# Patient Record
Sex: FEMALE | Race: WHITE | Employment: OTHER | URBAN - METROPOLITAN AREA
[De-identification: names, ages, dates, MRNs, and addresses within clinical notes are randomized per-mention and may not be internally consistent; named-entity substitution may affect disease eponyms.]

---

## 2020-02-25 ENCOUNTER — DOCUMENTATION ONLY (OUTPATIENT)
Dept: OTHER | Facility: CLINIC | Age: 85
End: 2020-02-25

## 2021-08-24 ENCOUNTER — ASSISTED LIVING VISIT (OUTPATIENT)
Dept: GERIATRICS | Facility: CLINIC | Age: 86
End: 2021-08-24
Payer: MEDICARE

## 2021-08-24 VITALS
HEART RATE: 82 BPM | RESPIRATION RATE: 20 BRPM | SYSTOLIC BLOOD PRESSURE: 155 MMHG | HEIGHT: 63 IN | TEMPERATURE: 96.6 F | WEIGHT: 129 LBS | DIASTOLIC BLOOD PRESSURE: 106 MMHG | BODY MASS INDEX: 22.86 KG/M2

## 2021-08-24 DIAGNOSIS — R26.9 IMPAIRED GAIT: ICD-10-CM

## 2021-08-24 DIAGNOSIS — M15.0 PRIMARY OSTEOARTHRITIS INVOLVING MULTIPLE JOINTS: ICD-10-CM

## 2021-08-24 DIAGNOSIS — E78.5 HYPERLIPIDEMIA, UNSPECIFIED HYPERLIPIDEMIA TYPE: ICD-10-CM

## 2021-08-24 DIAGNOSIS — G30.1 LATE ONSET ALZHEIMER'S DISEASE WITHOUT BEHAVIORAL DISTURBANCE (H): ICD-10-CM

## 2021-08-24 DIAGNOSIS — R26.2 DIFFICULTY WALKING: ICD-10-CM

## 2021-08-24 DIAGNOSIS — M25.551 HIP PAIN, RIGHT: ICD-10-CM

## 2021-08-24 DIAGNOSIS — I10 ESSENTIAL HYPERTENSION: Primary | ICD-10-CM

## 2021-08-24 DIAGNOSIS — J45.909 ASTHMA, UNSPECIFIED ASTHMA SEVERITY, UNSPECIFIED WHETHER COMPLICATED, UNSPECIFIED WHETHER PERSISTENT: ICD-10-CM

## 2021-08-24 DIAGNOSIS — M62.81 GENERALIZED MUSCLE WEAKNESS: ICD-10-CM

## 2021-08-24 DIAGNOSIS — F02.80 LATE ONSET ALZHEIMER'S DISEASE WITHOUT BEHAVIORAL DISTURBANCE (H): ICD-10-CM

## 2021-08-24 DIAGNOSIS — Z71.89 ACP (ADVANCE CARE PLANNING): ICD-10-CM

## 2021-08-24 DIAGNOSIS — F33.1 MODERATE EPISODE OF RECURRENT MAJOR DEPRESSIVE DISORDER (H): ICD-10-CM

## 2021-08-24 RX ORDER — ACETAMINOPHEN 500 MG
TABLET ORAL
Start: 2021-08-24 | End: 2021-09-19

## 2021-08-24 RX ORDER — VALSARTAN AND HYDROCHLOROTHIAZIDE 320; 25 MG/1; MG/1
1 TABLET, FILM COATED ORAL DAILY
Start: 2021-08-24 | End: 2021-09-19

## 2021-08-24 RX ORDER — DONEPEZIL HYDROCHLORIDE 5 MG/1
5 TABLET, FILM COATED ORAL AT BEDTIME
Start: 2021-08-24 | End: 2021-09-19

## 2021-08-24 RX ORDER — ALBUTEROL SULFATE 90 UG/1
2 AEROSOL, METERED RESPIRATORY (INHALATION) EVERY 4 HOURS PRN
Qty: 6.7 G | Refills: 3 | Status: SHIPPED | OUTPATIENT
Start: 2021-08-24

## 2021-08-24 ASSESSMENT — MIFFLIN-ST. JEOR: SCORE: 1013.23

## 2021-08-24 NOTE — LETTER
8/24/2021        RE: Edmundo James  [address] [place] [address]        [facility] GERIATRIC SERVICES  PRIMARY CARE PROVIDER AND CLINIC:  NADINE Rogers CNP, [address] [place] [address] / [place] [address]  Chief Complaint   Patient presents with     [facility] Medical Record Number:  [id number]  Place of Service where encounter took place:  [facility] ([facility]) [[id number]]    Edmundo James  is a 85 year old  (1935), living in above facility since March 2019 and now choosing to change PCPs to [facility].    HPI:      Patient is an 85 year old female that moved from [place] with her  in 2019.  She has remained in independent living since her move up to [place] and has not established with another healthcare provider or been seen medically.  She has had increased difficulty with ambulation and dressing and family wants her to establish with onsite care.  PHI significant for HTN, HLD, OA, asthma, and dementia without official work-up.  Very poor PMH is available and most given by her daughter, Kandice.    Patient is seen in her apartment today with her .  She reports doing just fine other then she is feeling weaker and having a harder time ambulating.  She defers most questions asked to her to her , saying she doesn't know.  Initially denies pain but then reports her knees and right hip bother her a lot, especially with ambulation.  She has fallen, none this month.      Per her , she has not been consistently taking her medications, although she feels that she has.  Mood appears down and depressed,  and daughter report this has been ongoing since her move to [place].  Patient agrees with this, but does not want to try any medications at this time    She denies shortness of breath, CP, dizziness, constipation, diarrhea, n/v, GERD, insomnia.  She does admit to being a little more forgetful then before, family has noticed a fairly significant  decline cognitively since move to [place].  She was started on Aricept back in [place], but per daughter, no formal work-up of her cognitive decline was done then.  We discussed ACP today, she and family in agreement DNR/DNI, ok for hospitalization for restorative cares.      CODE STATUS/ADVANCE DIRECTIVES DISCUSSION:  No CPR- Do NOT Intubate  DNR / DNI  ALLERGIES:   Allergies   Allergen Reactions     Cats Other (See Comments)     Runny nose, watery eyes      Dogs Other (See Comments)     Runny nose and watery eyes      Mold Other (See Comments)     Runny nose, watery eyes       PAST MEDICAL HISTORY:   Past Medical History:   Diagnosis Date     Heart disease      HTN (hypertension)      Hyperlipidemia      OA (osteoarthritis) of knee      Uncomplicated asthma       PAST SURGICAL HISTORY:   has a past surgical history that includes Cholecystectomy; Lumpectomy breast; and tonsillectomy.  FAMILY HISTORY: family history includes Heart Disease in her father; Hypertension in her father and mother.  SOCIAL HISTORY:   reports that she has never smoked. She has never used smokeless tobacco. She reports current alcohol use. She reports that she does not use drugs.  Patient's living condition: lives in an assisted living facility    Post Discharge Medication Reconciliation Status: patient was not discharged from an inpatient facility  Current Outpatient Medications   Medication Sig     albuterol (PROAIR HFA, PROVENTIL HFA, VENTOLIN HFA) 108 (90 BASE) MCG/ACT inhaler Inhale 2 puffs into the lungs every 6 hours     Atorvastatin Calcium (LIPITOR PO) Take 20 mg by mouth     BABY ASPIRIN PO      metoprolol (LOPRESSOR) 25 MG tablet Take 25 mg by mouth 2 times daily     triamcinolone (KENALOG) 0.1 % cream Apply sparingly to affected area three times daily as needed.     Valsartan (DIOVAN PO) Take 320 mg by mouth     No current facility-administered medications for this visit.       ROS:  10 point ROS of systems including Constitutional, Eyes,  "Respiratory, Cardiovascular, Gastroenterology, Genitourinary, Integumentary, Musculoskeletal, Psychiatric were all negative except for pertinent positives noted in my HPI.    Vitals:  BP (!) 155/106   Pulse 82   Temp (!) 96.6  F (35.9  C)   Resp 20   Ht 1.607 m (5' 3.25\")   Wt 58.5 kg (129 lb)   BMI 22.67 kg/m    Exam:  GENERAL APPEARANCE:  Alert, in no distress, cooperative  EYES:  EOM, conjunctivae, lids, pupils and irises normal, PERRL  NECK:  No adenopathy,masses or thyromegaly  RESP:  respiratory effort and palpation of chest normal, lungs clear to auscultation , no respiratory distress  CV:  Palpation and auscultation of heart done , regular rate and rhythm, no murmur, rub, or gallop, no edema  ABDOMEN:  no guarding or rebound, bowel sounds normal, no organomegaly  M/S:   Gait and station abnormal ambulates with 4ww, drags her left heel on ground, unsteady, difficulty going from sitting to standing position, gait very slow, no edema/erythema joints  SKIN:  no obvious rashes or wounds  NEURO:   speech clear, no tremor, normal strenght and tone, PERRL, poor recall of events   PSYCH:  oriented X 3, insight and judgement impaired, memory impaired , depressed     Lab/Diagnostic data:  none available    ASSESSMENT/PLAN:  (I10) Essential hypertension  (primary encounter diagnosis)  Comment: review of recent /95, 149/70, 151/96, 139/87, 132/81, 140/70  -per family, patient has not been consistently taking her medications.  She is supposed to be on Losartan/Hctz combo pill as well as metoprolol.  Staff will take over meds now.  Will need to assess BP with meds being taken and re-evaluate.  Discussed with family and staff today  Plan: BMP and CBC next week  -staff administer BP meds  -BP daily X 2 weeks, staff to update me   -losartan/hctz, metoprolol hold if HR <60    (M89.49) Primary osteoarthritis involving multiple joints  (R26.2) Difficulty walking  Impaired Gait  Right Hip Pain  (M62.81) Generalized " muscle weakness  Comment: severe to multiple joints, affecting ability to walk and dress self, pain to right hip.  No recent fall in past month.  No history of x-ray to right hip per family  -has been using prn Ibuprofen for pain, discussed dangers of this med in older adults and new pain management plan  Plan: AL staff assisting patient with cares  -home care for therapy strengthening, evaluation of potential needed DME  -x-ray right hip  -APAP 1,000mg po BID and QDprn  -Voltaren gel BID and BID prn     (J45.909) Asthma, unspecified asthma severity, unspecified whether complicated, unspecified whether persistent  Comment: per history, per family was severe at the time, but patient has not had any issues for several years.  Not currently on meds and has no rescue inhaler  Plan: albuterol rescue inhaler    (E78.5) Hyperlipidemia, unspecified hyperlipidemia type  Comment: per history  -lipid prescribed but per family, patient was not taking consistently  Plan: lipid panel next week   -on statin      (G30.1,  F02.80) Late onset Alzheimer's disease without behavioral disturbance (H)  Comment: per family report  -no formal cognitive testing available, but obvious dementia, poor historian, unable to recall events  -Aricept was prescribed while in [place], patient has not been taking consistently per family  Plan: staff assist with meds and cares   -aricept  -home care for cognitive testing   -TSH next week     ACP  Comment: discussed today with patient and family  DNR/DNI, ok for restorative cares    Depression  Comment: obvious during encounter, family reports has been ongoing issue since move to [place] from [place]  -discussed with patient today and she does not wish to start meds  Plan: AL staff will now be managing meds and assisting with cares, pain management as above.  Will re-evaluate in next few months and if no improvement, consider starting SSRI    Total time spent with patient visit at the skilled nursing facility was 80  minutes including patient visit, review of past records, phone call to patient contact and discussion on ACP. Greater than 50% of total time spent with counseling and coordinating care due to discussion on management of hip pain and impaired gait, management and monitoring of depression and HTN, discussion of ACP.     Electronically signed by:  NADINE Rogers CNP     Documentation of Face to Face and Certification for Home Health Services    I certify that services are/were furnished while this patient was under the care of a physician and that a physician or an allowed non-physician practitioner (NPP), had a face-to-face encounter that meets the physician face-to-face encounter requirements. The encounter was in whole, or in part, related to the primary reason for home health. The patient is confined to his/her home and needs intermittent skilled nursing, physical therapy, speech-language pathology, or the continued need for occupational therapy. A plan of care has been established by a physician and is periodically reviewed by a physician.  Date of Face-to-Face Encounter: 8/24/2021.    I certify that, based on my findings, the following services are medically necessary home health services: Occupational Therapy and Physical Therapy.    My clinical findings support the need for the above skilled services because: Requires assistance of another person or specialized equipment to access medical services because patient: Has prohibitive pain during ambulation. and Requires supervision of another for safe transfer...    Patient to re-establish plan of care with their PCP within 7-10 days after leaving the facility to reestablish care.  Medicare certified [facility] provider: NADINE Rogers CNP  Date: August 25, 2021                      Sincerely,        NADINE Rogers CNP

## 2021-08-26 ENCOUNTER — LAB REQUISITION (OUTPATIENT)
Dept: LAB | Facility: CLINIC | Age: 86
End: 2021-08-26
Payer: MEDICARE

## 2021-08-26 DIAGNOSIS — U07.1 COVID-19: ICD-10-CM

## 2021-08-27 PROCEDURE — U0003 INFECTIOUS AGENT DETECTION BY NUCLEIC ACID (DNA OR RNA); SEVERE ACUTE RESPIRATORY SYNDROME CORONAVIRUS 2 (SARS-COV-2) (CORONAVIRUS DISEASE [COVID-19]), AMPLIFIED PROBE TECHNIQUE, MAKING USE OF HIGH THROUGHPUT TECHNOLOGIES AS DESCRIBED BY CMS-2020-01-R: HCPCS | Mod: ORL | Performed by: NURSE PRACTITIONER

## 2021-08-28 LAB — SARS-COV-2 RNA RESP QL NAA+PROBE: NEGATIVE

## 2021-08-30 ENCOUNTER — LAB REQUISITION (OUTPATIENT)
Dept: LAB | Facility: CLINIC | Age: 86
End: 2021-08-30
Payer: MEDICARE

## 2021-08-30 ENCOUNTER — DOCUMENTATION ONLY (OUTPATIENT)
Dept: OTHER | Facility: CLINIC | Age: 86
End: 2021-08-30

## 2021-08-30 DIAGNOSIS — E55.9 VITAMIN D DEFICIENCY, UNSPECIFIED: ICD-10-CM

## 2021-08-30 DIAGNOSIS — I10 ESSENTIAL (PRIMARY) HYPERTENSION: ICD-10-CM

## 2021-08-30 DIAGNOSIS — R53.1 WEAKNESS: ICD-10-CM

## 2021-08-30 DIAGNOSIS — R41.82 ALTERED MENTAL STATUS, UNSPECIFIED: ICD-10-CM

## 2021-08-30 DIAGNOSIS — E78.5 HYPERLIPIDEMIA, UNSPECIFIED: ICD-10-CM

## 2021-08-31 ENCOUNTER — TRANSFERRED RECORDS (OUTPATIENT)
Dept: HEALTH INFORMATION MANAGEMENT | Facility: CLINIC | Age: 86
End: 2021-08-31

## 2021-08-31 DIAGNOSIS — E87.6 HYPOKALEMIA: Primary | ICD-10-CM

## 2021-08-31 LAB
ANION GAP SERPL CALCULATED.3IONS-SCNC: 5 MMOL/L (ref 3–14)
BUN SERPL-MCNC: 14 MG/DL (ref 7–30)
CALCIUM SERPL-MCNC: 8.6 MG/DL (ref 8.5–10.1)
CHLORIDE BLD-SCNC: 97 MMOL/L (ref 94–109)
CHOLEST SERPL-MCNC: 196 MG/DL
CO2 SERPL-SCNC: 29 MMOL/L (ref 20–32)
CREAT SERPL-MCNC: 0.64 MG/DL (ref 0.52–1.04)
ERYTHROCYTE [DISTWIDTH] IN BLOOD BY AUTOMATED COUNT: 14.3 % (ref 10–15)
FASTING STATUS PATIENT QL REPORTED: YES
GFR SERPL CREATININE-BSD FRML MDRD: 83 ML/MIN/1.73M2
GLUCOSE BLD-MCNC: 110 MG/DL (ref 70–99)
HCT VFR BLD AUTO: 40 % (ref 35–47)
HDLC SERPL-MCNC: 58 MG/DL
HGB BLD-MCNC: 13 G/DL (ref 11.7–15.7)
LDLC SERPL CALC-MCNC: 114 MG/DL
MCH RBC QN AUTO: 27.2 PG (ref 26.5–33)
MCHC RBC AUTO-ENTMCNC: 32.5 G/DL (ref 31.5–36.5)
MCV RBC AUTO: 84 FL (ref 78–100)
NONHDLC SERPL-MCNC: 138 MG/DL
PLATELET # BLD AUTO: 231 10E3/UL (ref 150–450)
POTASSIUM BLD-SCNC: 3.1 MMOL/L (ref 3.4–5.3)
RBC # BLD AUTO: 4.78 10E6/UL (ref 3.8–5.2)
SODIUM SERPL-SCNC: 131 MMOL/L (ref 133–144)
TRIGL SERPL-MCNC: 121 MG/DL
TSH SERPL DL<=0.005 MIU/L-ACNC: 3.87 MU/L (ref 0.4–4)
WBC # BLD AUTO: 2.8 10E3/UL (ref 4–11)

## 2021-08-31 PROCEDURE — 80048 BASIC METABOLIC PNL TOTAL CA: CPT | Mod: ORL | Performed by: NURSE PRACTITIONER

## 2021-08-31 PROCEDURE — 80061 LIPID PANEL: CPT | Mod: ORL | Performed by: NURSE PRACTITIONER

## 2021-08-31 PROCEDURE — 82306 VITAMIN D 25 HYDROXY: CPT | Mod: ORL | Performed by: NURSE PRACTITIONER

## 2021-08-31 PROCEDURE — 85027 COMPLETE CBC AUTOMATED: CPT | Mod: ORL | Performed by: NURSE PRACTITIONER

## 2021-08-31 PROCEDURE — 84443 ASSAY THYROID STIM HORMONE: CPT | Mod: ORL | Performed by: NURSE PRACTITIONER

## 2021-08-31 RX ORDER — POTASSIUM CHLORIDE 1500 MG/1
20 TABLET, EXTENDED RELEASE ORAL DAILY
Qty: 30 TABLET | Refills: 3 | Status: SHIPPED | OUTPATIENT
Start: 2021-08-31 | End: 2021-11-12

## 2021-09-01 ENCOUNTER — LAB REQUISITION (OUTPATIENT)
Dept: LAB | Facility: CLINIC | Age: 86
End: 2021-09-01
Payer: MEDICARE

## 2021-09-01 DIAGNOSIS — R30.0 DYSURIA: ICD-10-CM

## 2021-09-01 DIAGNOSIS — N39.0 URINARY TRACT INFECTION WITHOUT HEMATURIA, SITE UNSPECIFIED: Primary | ICD-10-CM

## 2021-09-01 DIAGNOSIS — E55.9 VITAMIN D DEFICIENCY: ICD-10-CM

## 2021-09-01 LAB
ALBUMIN UR-MCNC: 20 MG/DL
APPEARANCE UR: ABNORMAL
BACTERIA #/AREA URNS HPF: ABNORMAL /HPF
BILIRUB UR QL STRIP: NEGATIVE
COLOR UR AUTO: YELLOW
DEPRECATED CALCIDIOL+CALCIFEROL SERPL-MC: 19 UG/L (ref 20–75)
GLUCOSE UR STRIP-MCNC: NEGATIVE MG/DL
HGB UR QL STRIP: NEGATIVE
HYALINE CASTS: 1 /LPF
KETONES UR STRIP-MCNC: 10 MG/DL
LEUKOCYTE ESTERASE UR QL STRIP: NEGATIVE
MUCOUS THREADS #/AREA URNS LPF: PRESENT /LPF
NITRATE UR QL: NEGATIVE
PH UR STRIP: 6 [PH] (ref 5–7)
RBC URINE: <1 /HPF
SP GR UR STRIP: 1.02 (ref 1–1.03)
SQUAMOUS EPITHELIAL: 2 /HPF
UROBILINOGEN UR STRIP-MCNC: NORMAL MG/DL
WBC URINE: <1 /HPF

## 2021-09-01 PROCEDURE — 81001 URINALYSIS AUTO W/SCOPE: CPT | Mod: ORL | Performed by: NURSE PRACTITIONER

## 2021-09-01 RX ORDER — CHOLECALCIFEROL (VITAMIN D3) 50 MCG
1 TABLET ORAL DAILY
Qty: 30 TABLET | Refills: 3 | Status: SHIPPED | OUTPATIENT
Start: 2021-09-01 | End: 2021-11-12

## 2021-09-02 ENCOUNTER — LAB REQUISITION (OUTPATIENT)
Dept: LAB | Facility: CLINIC | Age: 86
End: 2021-09-02
Payer: MEDICARE

## 2021-09-02 DIAGNOSIS — U07.1 COVID-19: ICD-10-CM

## 2021-09-03 PROCEDURE — U0005 INFEC AGEN DETEC AMPLI PROBE: HCPCS | Mod: ORL | Performed by: NURSE PRACTITIONER

## 2021-09-03 NOTE — PROGRESS NOTES
[facility] GERIATRIC SERVICES    Chief Complaint   Patient presents with     RECHECK      f/u med changes       [facility] Medical Record Number:  [id number]  Place of Service where encounter took place:  [facility] ([facility]) [[id number]]    HPI:    Edmundo James is a 85 year old  (1935), who is being seen today for an episodic care visit.  HPI information obtained from: facility chart records, facility staff, patient report and [facility] chart review.Today's concern is:    1. Vitamin D deficiency    2. Essential hypertension    3. Late onset Alzheimer's disease without behavioral disturbance (H)    4. Difficulty walking    5. Hip pain, right    6. Hypokalemia    7. 2019 novel coronavirus disease (COVID-19)    8. Hyponatremia      Patient is an 85 year old female recently admitted to [facility] due to increased care needs.  PHI significant for HTN, HLD, OA, asthma, and dementia without official work-up.    She is seen today to follow-up on labs, med changes, BP's and pain level.  Since our visit she has been working with physical therapy.  We also have x-ray films from her right hip, advanced OA.  Therapy recommending she be seen by ortho and evaluated for possible THIAGO due to the significant impairment to her gait and associated pain.  Patient is considering this    Labs were fairly unremarkable other then hypokalemia, mild hyponatremia, she was started on potassium replacement.  She has also been consistently receiving APAP and voltaren gel for pain.  She was recently diagnosed with COVID-19, there are several residents that have tested positive in her AL facility.  She was vaccinated.      Today she reports non-productive cough, general malaise and weakness with lack of appetite.  She tells me her hip pain has improved with voltaren and scheduled APAP.  She has no other concerns today       ALLERGIES: Cats, Dogs, and Mold  Past Medical, Surgical, Family and  "Social History reviewed and updated in [facility].    Current Outpatient Medications   Medication Sig Dispense Refill     acetaminophen (TYLENOL) 500 MG tablet Take 2 tablets (1,000 mg) by mouth 2 times daily. May also take 2 tablets (1,000 mg) daily as needed for mild pain.       albuterol (PROAIR HFA/PROVENTIL HFA/VENTOLIN HFA) 108 (90 Base) MCG/ACT inhaler Inhale 2 puffs into the lungs every 4 hours as needed for shortness of breath / dyspnea or wheezing 6.7 g 3     Atorvastatin Calcium (LIPITOR PO) Take 20 mg by mouth       diclofenac (VOLTAREN) 1 % topical gel Apply 2 g topically 2 times daily. May also apply 2 g 2 times daily as needed for moderate pain.       donepezil (ARICEPT) 5 MG tablet Take 1 tablet (5 mg) by mouth At Bedtime       metoprolol (LOPRESSOR) 25 MG tablet Take 25 mg by mouth daily       potassium chloride ER (K-TAB) 20 MEQ CR tablet Take 1 tablet (20 mEq) by mouth daily 30 tablet 3     valsartan-hydrochlorothiazide (DIOVAN HCT) 320-25 MG tablet Take 1 tablet by mouth daily       vitamin D3 (CHOLECALCIFEROL) 50 mcg (2000 units) tablet Take 1 tablet (50 mcg) by mouth daily 30 tablet 3     Medications reviewed:  Medications reconciled to facility chart and changes were made to reflect current medications as identified as above med list. Below are the changes that were made:   Medications stopped since last EPIC medication reconciliation:   There are no discontinued medications.    Medications started since last [facility] medication reconciliation:  No orders of the defined types were placed in this encounter.        REVIEW OF SYSTEMS:  10 point ROS of systems including Constitutional, Eyes, Respiratory, Cardiovascular, Gastroenterology, Genitourinary, Integumentary, Musculoskeletal, Psychiatric were all negative except for pertinent positives noted in my HPI.    Physical Exam:  /80   Pulse 88   Temp 97.7  F (36.5  C)   Ht 1.607 m (5' 3.25\")   Wt 58.5 kg (129 lb)   BMI 22.67 kg/m    GENERAL " APPEARANCE:  Alert but fatigued   RESP:  non-productive cough, respiratory effort and palpation of chest normal, lungs clear to auscultation , no respiratory distress, sating well on room air   CV:  Palpation and auscultation of heart done , regular rate and rhythm, no murmur, rub, or gallop, no edema  M/S:   Gait and station abnormal ambulates with 4ww, drags her left heel on ground, unsteady, difficulty going from sitting to standing position, gait very slow, no edema/erythema joints  NEURO:   speech clear, no tremor, normal strenght and tone, PERRL, poor recall of events   PSYCH:  oriented X 3, insight and judgement impaired, memory impaired , depressed     Recent Labs:     CBC RESULTS:   Recent Labs   Lab Test 08/31/21  0805   WBC 2.8*   RBC 4.78   HGB 13.0   HCT 40.0   MCV 84   MCH 27.2   MCHC 32.5   RDW 14.3          Last Basic Metabolic Panel:  Recent Labs   Lab Test 08/31/21  0805   *   POTASSIUM 3.1*   CHLORIDE 97   RANDALL 8.6   CO2 29   BUN 14   CR 0.64   *       Liver Function Studies - No results for input(s): PROTTOTAL, ALBUMIN, BILITOTAL, ALKPHOS, AST, ALT, BILIDIRECT in the last 52183 hours.    TSH   Date Value Ref Range Status   08/31/2021 3.87 0.40 - 4.00 mU/L Final   ]    No results found for: A1C      Assessment/Plan:  (E55.9) Vitamin D deficiency  (primary encounter diagnosis)  Comment: per lab results  Plan: started in Vitamin D supplementation  -will recheck level in next 1-2 months     COVID  Comment: tested positive 9/3, several residents in AL facility with COVID  -increased fatigue, weakness and confusion  -vitals stable and no reports of respiratory issues  Plan: encourage hydration, eating, rest  -staff update with concerns     (I10) Essential hypertension  Comment: review of recent BP's: 108/80, 125/71, 113/78. 104/70, 139/83  -BP's lower then desired given age and fall risk  -per daughter, patient was started on metoprolol due to elevated HR in the past, typically ran  , no known afib per daughter, sounds regular on exam today and rate in 80's    Plan: losartan/hctz  -on potassium supplementation now   -decrease metoprolol to 12.5mg po every day  -BP daily X 1 week, update NP next Tuesday      (G30.1,  F02.80) Late onset Alzheimer's disease without behavioral disturbance (H)  Comment: per family report  -no formal cognitive testing available, but obvious dementia, poor historian, unable to recall events  -Aricept was prescribed while in [place], patient has not been taking consistently per family  Plan: staff assist with meds and cares   -aricept  -home care for cognitive testing   Lab Results   Component Value Date    TSH 3.87 08/31/2021         (R26.2) Difficulty walking  (M25.551) Hip pain, right  Comment: advanced OA particularly to right hip  -working with therapy  Plan: referral to ortho for discussion of potential THIAGO, discussed this with daughter today as well   -APAP and voltaren  -continue with home health therapy     (E87.6) Hypokalemia  Comment:   Lab Results   Component Value Date    POTASSIUM 3.1 08/31/2021     -started on potassium replacement  Plan: KCL replacement  -K+ recheck next week     Hyponatremia  Comment: Na 131 on labs, also on hydrochlorothiazide  Plan: monitor, consider d'c hydrochlorothiazide if continues to be an issue   -recheck Na next week     Electronically signed by  NADINE Rogers CNP

## 2021-09-04 LAB — SARS-COV-2 RNA RESP QL NAA+PROBE: POSITIVE

## 2021-09-07 ENCOUNTER — ASSISTED LIVING VISIT (OUTPATIENT)
Dept: GERIATRICS | Facility: CLINIC | Age: 86
End: 2021-09-07
Payer: MEDICARE

## 2021-09-07 VITALS
WEIGHT: 129 LBS | DIASTOLIC BLOOD PRESSURE: 80 MMHG | HEIGHT: 63 IN | HEART RATE: 88 BPM | TEMPERATURE: 97.7 F | BODY MASS INDEX: 22.86 KG/M2 | SYSTOLIC BLOOD PRESSURE: 108 MMHG

## 2021-09-07 DIAGNOSIS — F02.80 LATE ONSET ALZHEIMER'S DISEASE WITHOUT BEHAVIORAL DISTURBANCE (H): ICD-10-CM

## 2021-09-07 DIAGNOSIS — G30.1 LATE ONSET ALZHEIMER'S DISEASE WITHOUT BEHAVIORAL DISTURBANCE (H): ICD-10-CM

## 2021-09-07 DIAGNOSIS — E55.9 VITAMIN D DEFICIENCY: Primary | ICD-10-CM

## 2021-09-07 DIAGNOSIS — U07.1 2019 NOVEL CORONAVIRUS DISEASE (COVID-19): ICD-10-CM

## 2021-09-07 DIAGNOSIS — E87.6 HYPOKALEMIA: ICD-10-CM

## 2021-09-07 DIAGNOSIS — R26.2 DIFFICULTY WALKING: ICD-10-CM

## 2021-09-07 DIAGNOSIS — I10 ESSENTIAL HYPERTENSION: ICD-10-CM

## 2021-09-07 DIAGNOSIS — E87.1 HYPONATREMIA: ICD-10-CM

## 2021-09-07 DIAGNOSIS — M25.551 HIP PAIN, RIGHT: ICD-10-CM

## 2021-09-07 PROCEDURE — 99207 PR CDG-MDM COMPONENT: MEETS MODERATE - DOWN CODED: CPT | Performed by: NURSE PRACTITIONER

## 2021-09-07 RX ORDER — METOPROLOL TARTRATE 25 MG/1
12.5 TABLET, FILM COATED ORAL DAILY
Qty: 15 TABLET | Refills: 3 | Status: SHIPPED | OUTPATIENT
Start: 2021-09-07 | End: 2021-09-14

## 2021-09-07 ASSESSMENT — MIFFLIN-ST. JEOR: SCORE: 1013.23

## 2021-09-07 NOTE — LETTER
9/7/2021        RE: Edmundo James  [address] [place] [address]        [facility] GERIATRIC SERVICES    Chief Complaint   Patient presents with     RECHECK      f/u med changes       [facility] Medical Record Number:  [id number]  Place of Service where encounter took place:  [facility] ([facility]) [[id number]]    HPI:    Edmundo James is a 85 year old  (1935), who is being seen today for an episodic care visit.  HPI information obtained from: facility chart records, facility staff, patient report and [facility] chart review.Today's concern is:    1. Vitamin D deficiency    2. Essential hypertension    3. Late onset Alzheimer's disease without behavioral disturbance (H)    4. Difficulty walking    5. Hip pain, right    6. Hypokalemia    7. 2019 novel coronavirus disease (COVID-19)    8. Hyponatremia      Patient is an 85 year old female recently admitted to [facility] due to increased care needs.  PHI significant for HTN, HLD, OA, asthma, and dementia without official work-up.    She is seen today to follow-up on labs, med changes, BP's and pain level.  Since our visit she has been working with physical therapy.  We also have x-ray films from her right hip, advanced OA.  Therapy recommending she be seen by ortho and evaluated for possible THIAGO due to the significant impairment to her gait and associated pain.  Patient is considering this    Labs were fairly unremarkable other then hypokalemia, mild hyponatremia, she was started on potassium replacement.  She has also been consistently receiving APAP and voltaren gel for pain.  She was recently diagnosed with COVID-19, there are several residents that have tested positive in her AL facility.  She was vaccinated.      Today she reports non-productive cough, general malaise and weakness with lack of appetite.  She tells me her hip pain has improved with voltaren and scheduled APAP.  She has no  other concerns today       ALLERGIES: Cats, Dogs, and Mold  Past Medical, Surgical, Family and Social History reviewed and updated in [facility].    Current Outpatient Medications   Medication Sig Dispense Refill     acetaminophen (TYLENOL) 500 MG tablet Take 2 tablets (1,000 mg) by mouth 2 times daily. May also take 2 tablets (1,000 mg) daily as needed for mild pain.       albuterol (PROAIR HFA/PROVENTIL HFA/VENTOLIN HFA) 108 (90 Base) MCG/ACT inhaler Inhale 2 puffs into the lungs every 4 hours as needed for shortness of breath / dyspnea or wheezing 6.7 g 3     Atorvastatin Calcium (LIPITOR PO) Take 20 mg by mouth       diclofenac (VOLTAREN) 1 % topical gel Apply 2 g topically 2 times daily. May also apply 2 g 2 times daily as needed for moderate pain.       donepezil (ARICEPT) 5 MG tablet Take 1 tablet (5 mg) by mouth At Bedtime       metoprolol (LOPRESSOR) 25 MG tablet Take 25 mg by mouth daily       potassium chloride ER (K-TAB) 20 MEQ CR tablet Take 1 tablet (20 mEq) by mouth daily 30 tablet 3     valsartan-hydrochlorothiazide (DIOVAN HCT) 320-25 MG tablet Take 1 tablet by mouth daily       vitamin D3 (CHOLECALCIFEROL) 50 mcg (2000 units) tablet Take 1 tablet (50 mcg) by mouth daily 30 tablet 3     Medications reviewed:  Medications reconciled to facility chart and changes were made to reflect current medications as identified as above med list. Below are the changes that were made:   Medications stopped since last EPIC medication reconciliation:   There are no discontinued medications.    Medications started since last [facility] medication reconciliation:  No orders of the defined types were placed in this encounter.        REVIEW OF SYSTEMS:  10 point ROS of systems including Constitutional, Eyes, Respiratory, Cardiovascular, Gastroenterology, Genitourinary, Integumentary, Musculoskeletal, Psychiatric were all negative except for pertinent positives noted in my HPI.    Physical Exam:  /80   Pulse 88   Temp 97.7  " F (36.5  C)   Ht 1.607 m (5' 3.25\")   Wt 58.5 kg (129 lb)   BMI 22.67 kg/m    GENERAL APPEARANCE:  Alert but fatigued   RESP:  non-productive cough, respiratory effort and palpation of chest normal, lungs clear to auscultation , no respiratory distress, sating well on room air   CV:  Palpation and auscultation of heart done , regular rate and rhythm, no murmur, rub, or gallop, no edema  M/S:   Gait and station abnormal ambulates with 4ww, drags her left heel on ground, unsteady, difficulty going from sitting to standing position, gait very slow, no edema/erythema joints  NEURO:   speech clear, no tremor, normal strenght and tone, PERRL, poor recall of events   PSYCH:  oriented X 3, insight and judgement impaired, memory impaired , depressed     Recent Labs:     CBC RESULTS:   Recent Labs   Lab Test 08/31/21  0805   WBC 2.8*   RBC 4.78   HGB 13.0   HCT 40.0   MCV 84   MCH 27.2   MCHC 32.5   RDW 14.3          Last Basic Metabolic Panel:  Recent Labs   Lab Test 08/31/21  0805   *   POTASSIUM 3.1*   CHLORIDE 97   RANDALL 8.6   CO2 29   BUN 14   CR 0.64   *       Liver Function Studies - No results for input(s): PROTTOTAL, ALBUMIN, BILITOTAL, ALKPHOS, AST, ALT, BILIDIRECT in the last 19906 hours.    TSH   Date Value Ref Range Status   08/31/2021 3.87 0.40 - 4.00 mU/L Final   ]    No results found for: A1C      Assessment/Plan:  (E55.9) Vitamin D deficiency  (primary encounter diagnosis)  Comment: per lab results  Plan: started in Vitamin D supplementation  -will recheck level in next 1-2 months     COVID  Comment: tested positive 9/3, several residents in AL facility with COVID  -increased fatigue, weakness and confusion  -vitals stable and no reports of respiratory issues  Plan: encourage hydration, eating, rest  -staff update with concerns     (I10) Essential hypertension  Comment: review of recent BP's: 108/80, 125/71, 113/78. 104/70, 139/83  -BP's lower then desired given age and fall risk  -per " daughter, patient was started on metoprolol due to elevated HR in the past, typically ran , no known afib per daughter, sounds regular on exam today and rate in 80's    Plan: losartan/hctz  -on potassium supplementation now   -decrease metoprolol to 12.5mg po every day  -BP daily X 1 week, update NP next Tuesday      (G30.1,  F02.80) Late onset Alzheimer's disease without behavioral disturbance (H)  Comment: per family report  -no formal cognitive testing available, but obvious dementia, poor historian, unable to recall events  -Aricept was prescribed while in [place], patient has not been taking consistently per family  Plan: staff assist with meds and cares   -aricept  -home care for cognitive testing   Lab Results   Component Value Date    TSH 3.87 08/31/2021         (R26.2) Difficulty walking  (M25.551) Hip pain, right  Comment: advanced OA particularly to right hip  -working with therapy  Plan: referral to ortho for discussion of potential THIAGO, discussed this with daughter today as well   -APAP and voltaren  -continue with home health therapy     (E87.6) Hypokalemia  Comment:   Lab Results   Component Value Date    POTASSIUM 3.1 08/31/2021     -started on potassium replacement  Plan: KCL replacement  -K+ recheck next week     Hyponatremia  Comment: Na 131 on labs, also on hydrochlorothiazide  Plan: monitor, consider d'c hydrochlorothiazide if continues to be an issue   -recheck Na next week     Electronically signed by  NADINE Rogers CNP                        Sincerely,        NADINE Rogers CNP

## 2021-09-13 ENCOUNTER — LAB REQUISITION (OUTPATIENT)
Dept: LAB | Facility: CLINIC | Age: 86
End: 2021-09-13
Payer: MEDICARE

## 2021-09-13 VITALS
BODY MASS INDEX: 23 KG/M2 | WEIGHT: 129.8 LBS | HEART RATE: 115 BPM | DIASTOLIC BLOOD PRESSURE: 71 MMHG | TEMPERATURE: 96.7 F | HEIGHT: 63 IN | SYSTOLIC BLOOD PRESSURE: 112 MMHG

## 2021-09-13 DIAGNOSIS — E87.6 HYPOKALEMIA: ICD-10-CM

## 2021-09-13 DIAGNOSIS — E87.1 HYPO-OSMOLALITY AND HYPONATREMIA: ICD-10-CM

## 2021-09-13 ASSESSMENT — MIFFLIN-ST. JEOR: SCORE: 1016.86

## 2021-09-13 NOTE — PROGRESS NOTES
"[facility] GERIATRIC SERVICES    Chief Complaint   Patient presents with     RECHECK     HPI:  Edmundo James is a 85 year old  (1935), who is being seen today for an episodic care visit at: [facility] ([facility]) [[id number]]. Today's concern is:     Patient is an 85 year old female recently admitted to [facility] due to increased care needs.  PHI significant for HTN, HLD, OA, asthma, and dementia without official work-up.     She is seen today per daughter's request due to concerns of depression, as well as to follow-up on lab work and BP's.  Family reports patient has been depressed since her move to [place], patient is denial.  Per daughter, patient admitted to her over the weekend that she was very depressed.  Diagnosis of COVID seems to have exacerbated.  She has not been on any antidepressants in the past per family's knowledge, and they are agreeable to try    Last labs noted for hypokalemia (started on replacement) as well as slight hyponatremia.  Lab recheck pending on that for today.  Also have been closely monitoring BP's, as since her move to [place], patient was not consistently taking medications or monitoring her BP's     Patient is found in her apartment today, still with wet cough, denies shortness of breath.  She is feeling tired but her appetite is improved from last week.  She does tell me that she is depressed, \"get sick of staring at these 4 walls, [place] to be in [place]\"  She also mentions that she feels she is a bother to her family.      Allergies, and PMH/PSH reviewed in [facility] today.  REVIEW OF SYSTEMS:  4 point ROS including Respiratory, CV, GI and , other than that noted in the HPI,  is negative    Objective:   /71   Pulse 115   Temp (!) 96.7  F (35.9  C)   Ht 1.607 m (5' 3.25\")   Wt 58.9 kg (129 lb 12.8 oz)   BMI 22.81 kg/m    GENERAL APPEARANCE:  Alert but fatigued   RESP:  wet cough, respiratory effort and palpation of chest normal, lungs clear to " auscultation , no respiratory distress, sating well on room air   CV:  Palpation and auscultation of heart done , regular rate and rhythm, no murmur, rub, or gallop, no edema  NEURO:   speech clear, no tremor, normal strenght and tone, PERRL, poor recall of events   PSYCH:  oriented X 3, insight and judgement impaired, memory impaired , depressed     CBC RESULTS: Recent Labs   Lab Test 08/31/21  0805   WBC 2.8*   RBC 4.78   HGB 13.0   HCT 40.0   MCV 84   MCH 27.2   MCHC 32.5   RDW 14.3          Last Basic Metabolic Panel:  Recent Labs   Lab Test 09/14/21  0821 08/31/21  0805    131*   POTASSIUM 3.8 3.1*   CHLORIDE  --  97   RANDALL  --  8.6   CO2  --  29   BUN  --  14   CR  --  0.64   GLC  --  110*       Liver Function Studies - No results for input(s): PROTTOTAL, ALBUMIN, BILITOTAL, ALKPHOS, AST, ALT, BILIDIRECT in the last 39176 hours.    TSH   Date Value Ref Range Status   08/31/2021 3.87 0.40 - 4.00 mU/L Final   ]    No results found for: A1C        Assessment/Plan:  (F33.1) Moderate episode of recurrent major depressive disorder (H)  (primary encounter diagnosis)  Comment: noted per family since move to [place], exacerbated with COVID  Plan: celexa 10mg po every day, monitor and consider dose increase if no effect in 4-6 weeks     (I10) Essential hypertension  Comment: most recent review of BP's 112/71, 121/50, 188/80, 115/75, 121/85, 108/80  -HR's 73-88  -per daughter, patient was started on metoprolol due to elevated HR in the past, typically ran , no known afib per daughter, sounds regular on exam today and rate in 80's    Plan: losartan/hctz  -on potassium supplementation now   -d'c metoprolol  -HR and BP daily X 1 week and staff to update me next week     (G30.1,  F02.80) Late onset Alzheimer's disease without behavioral disturbance (H)  Comment: per family report  -no formal cognitive testing available, but obvious dementia, poor historian, unable to recall events  Plan: staff assist with meds  and cares   -aricept  -home care for cognitive testing     (E87.1) Hyponatremia  Comment: Na 133 today on labs, improved from 131, also on hydrochlorothiazide  Plan: monitor, consider d'c hydrochlorothiazide if continues to be an issue   -BMP recheck next month    (E87.6) Hypokalemia  Comment:   Lab Results   Component Value Date    POTASSIUM 3.1 08/31/2021       -started on potassium replacement and K+ improved today  Plan: KCL rep  -BMP recheck in 1 month       Electronically signed by: NADINE Rogers CNP

## 2021-09-14 ENCOUNTER — TRANSFERRED RECORDS (OUTPATIENT)
Dept: HEALTH INFORMATION MANAGEMENT | Facility: CLINIC | Age: 86
End: 2021-09-14

## 2021-09-14 ENCOUNTER — TELEPHONE (OUTPATIENT)
Dept: GERIATRICS | Facility: CLINIC | Age: 86
End: 2021-09-14

## 2021-09-14 ENCOUNTER — ASSISTED LIVING VISIT (OUTPATIENT)
Dept: GERIATRICS | Facility: CLINIC | Age: 86
End: 2021-09-14
Payer: MEDICARE

## 2021-09-14 DIAGNOSIS — U07.1 2019 NOVEL CORONAVIRUS DISEASE (COVID-19): ICD-10-CM

## 2021-09-14 DIAGNOSIS — F33.1 MODERATE EPISODE OF RECURRENT MAJOR DEPRESSIVE DISORDER (H): Primary | ICD-10-CM

## 2021-09-14 DIAGNOSIS — F02.80 LATE ONSET ALZHEIMER'S DISEASE WITHOUT BEHAVIORAL DISTURBANCE (H): ICD-10-CM

## 2021-09-14 DIAGNOSIS — I10 ESSENTIAL HYPERTENSION: ICD-10-CM

## 2021-09-14 DIAGNOSIS — G30.1 LATE ONSET ALZHEIMER'S DISEASE WITHOUT BEHAVIORAL DISTURBANCE (H): ICD-10-CM

## 2021-09-14 DIAGNOSIS — E87.1 HYPONATREMIA: ICD-10-CM

## 2021-09-14 DIAGNOSIS — E87.6 HYPOKALEMIA: ICD-10-CM

## 2021-09-14 LAB
POTASSIUM BLD-SCNC: 3.8 MMOL/L (ref 3.4–5.3)
SODIUM SERPL-SCNC: 133 MMOL/L (ref 133–144)

## 2021-09-14 PROCEDURE — 84295 ASSAY OF SERUM SODIUM: CPT | Mod: ORL | Performed by: NURSE PRACTITIONER

## 2021-09-14 PROCEDURE — 84132 ASSAY OF SERUM POTASSIUM: CPT | Mod: ORL | Performed by: NURSE PRACTITIONER

## 2021-09-14 PROCEDURE — 36415 COLL VENOUS BLD VENIPUNCTURE: CPT | Mod: ORL | Performed by: NURSE PRACTITIONER

## 2021-09-14 PROCEDURE — P9604 ONE-WAY ALLOW PRORATED TRIP: HCPCS | Mod: ORL | Performed by: NURSE PRACTITIONER

## 2021-09-14 RX ORDER — CITALOPRAM HYDROBROMIDE 10 MG/1
10 TABLET ORAL DAILY
Start: 2021-09-14 | End: 2021-09-19

## 2021-09-14 NOTE — LETTER
"    9/14/2021        RE: Edmundo James  [address] [place] [address]        [facility] GERIATRIC SERVICES    Chief Complaint   Patient presents with     RECHECK     HPI:  Edmundo James is a 85 year old  (1935), who is being seen today for an episodic care visit at: [facility] ([facility]) [[id number]]. Today's concern is:     Patient is an 85 year old female recently admitted to [facility] due to increased care needs.  PHI significant for HTN, HLD, OA, asthma, and dementia without official work-up.     She is seen today per daughter's request due to concerns of depression, as well as to follow-up on lab work and BP's.  Family reports patient has been depressed since her move to [place], patient is denial.  Per daughter, patient admitted to her over the weekend that she was very depressed.  Diagnosis of COVID seems to have exacerbated.  She has not been on any antidepressants in the past per family's knowledge, and they are agreeable to try    Last labs noted for hypokalemia (started on replacement) as well as slight hyponatremia.  Lab recheck pending on that for today.  Also have been closely monitoring BP's, as since her move to [place], patient was not consistently taking medications or monitoring her BP's     Patient is found in her apartment today, still with wet cough, denies shortness of breath.  She is feeling tired but her appetite is improved from last week.  She does tell me that she is depressed, \"get sick of staring at these 4 walls, [place] to be [place]\"  She also mentions that she feels she is a bother to her family.      Allergies, and PMH/PSH reviewed in [facility] today.  REVIEW OF SYSTEMS:  4 point ROS including Respiratory, CV, GI and , other than that noted in the HPI,  is negative    Objective:   /71   Pulse 115   Temp (!) 96.7  F (35.9  C)   Ht 1.607 m (5' 3.25\")   Wt 58.9 kg (129 lb 12.8 oz)   BMI 22.81 kg/m    GENERAL APPEARANCE:  Alert but fatigued "   RESP:  wet cough, respiratory effort and palpation of chest normal, lungs clear to auscultation , no respiratory distress, sating well on room air   CV:  Palpation and auscultation of heart done , regular rate and rhythm, no murmur, rub, or gallop, no edema  NEURO:   speech clear, no tremor, normal strenght and tone, PERRL, poor recall of events   PSYCH:  oriented X 3, insight and judgement impaired, memory impaired , depressed     CBC RESULTS: Recent Labs   Lab Test 08/31/21  0805   WBC 2.8*   RBC 4.78   HGB 13.0   HCT 40.0   MCV 84   MCH 27.2   MCHC 32.5   RDW 14.3          Last Basic Metabolic Panel:  Recent Labs   Lab Test 09/14/21  0821 08/31/21  0805    131*   POTASSIUM 3.8 3.1*   CHLORIDE  --  97   RANDALL  --  8.6   CO2  --  29   BUN  --  14   CR  --  0.64   GLC  --  110*       Liver Function Studies - No results for input(s): PROTTOTAL, ALBUMIN, BILITOTAL, ALKPHOS, AST, ALT, BILIDIRECT in the last 75900 hours.    TSH   Date Value Ref Range Status   08/31/2021 3.87 0.40 - 4.00 mU/L Final   ]    No results found for: A1C        Assessment/Plan:  (F33.1) Moderate episode of recurrent major depressive disorder (H)  (primary encounter diagnosis)  Comment: noted per family since move to [place], exacerbated with COVID  Plan: celexa 10mg po every day, monitor and consider dose increase if no effect in 4-6 weeks     (I10) Essential hypertension  Comment: most recent review of BP's 112/71, 121/50, 188/80, 115/75, 121/85, 108/80  -HR's 73-88  -per daughter, patient was started on metoprolol due to elevated HR in the past, typically ran , no known afib per daughter, sounds regular on exam today and rate in 80's    Plan: losartan/hctz  -on potassium supplementation now   -d'c metoprolol  -HR and BP daily X 1 week and staff to update me next week     (G30.1,  F02.80) Late onset Alzheimer's disease without behavioral disturbance (H)  Comment: per family report  -no formal cognitive testing available, but  obvious dementia, poor historian, unable to recall events  Plan: staff assist with meds and cares   -aricept  -home care for cognitive testing     (E87.1) Hyponatremia  Comment: Na 133 today on labs, improved from 131, also on hydrochlorothiazide  Plan: monitor, consider d'c hydrochlorothiazide if continues to be an issue   -BMP recheck next month    (E87.6) Hypokalemia  Comment:   Lab Results   Component Value Date    POTASSIUM 3.1 08/31/2021       -started on potassium replacement and K+ improved today  Plan: KCL rep  -BMP recheck in 1 month       Electronically signed by: NADINE Rogers CNP             Sincerely,        NADINE Rogers CNP

## 2021-09-19 ENCOUNTER — HOSPITAL ENCOUNTER (OUTPATIENT)
Facility: CLINIC | Age: 86
Setting detail: OBSERVATION
Discharge: HOME | End: 2021-09-20
Attending: EMERGENCY MEDICINE | Admitting: HOSPITALIST
Payer: MEDICARE

## 2021-09-19 DIAGNOSIS — I10 ESSENTIAL HYPERTENSION: ICD-10-CM

## 2021-09-19 DIAGNOSIS — T50.901A ACCIDENTAL DRUG OVERDOSE, INITIAL ENCOUNTER: ICD-10-CM

## 2021-09-19 DIAGNOSIS — I95.9 HYPOTENSION, UNSPECIFIED HYPOTENSION TYPE: ICD-10-CM

## 2021-09-19 DIAGNOSIS — R19.7 DIARRHEA, UNSPECIFIED TYPE: ICD-10-CM

## 2021-09-19 LAB
ALBUMIN SERPL-MCNC: 2.9 G/DL (ref 3.4–5)
ALBUMIN UR-MCNC: 10 MG/DL
ALP SERPL-CCNC: 52 U/L (ref 40–150)
ALT SERPL W P-5'-P-CCNC: 33 U/L (ref 0–50)
ANION GAP SERPL CALCULATED.3IONS-SCNC: 9 MMOL/L (ref 3–14)
APPEARANCE UR: CLEAR
AST SERPL W P-5'-P-CCNC: 25 U/L (ref 0–45)
BASOPHILS # BLD AUTO: 0 10E3/UL (ref 0–0.2)
BASOPHILS NFR BLD AUTO: 1 %
BILIRUB SERPL-MCNC: 0.5 MG/DL (ref 0.2–1.3)
BILIRUB UR QL STRIP: NEGATIVE
BUN SERPL-MCNC: 20 MG/DL (ref 7–30)
C COLI+JEJUNI+LARI FUSA STL QL NAA+PROBE: NOT DETECTED
C DIFF TOX B STL QL: NEGATIVE
CA-I BLD-MCNC: 4.9 MG/DL (ref 4.4–5.2)
CALCIUM SERPL-MCNC: 8.2 MG/DL (ref 8.5–10.1)
CHLORIDE BLD-SCNC: 102 MMOL/L (ref 94–109)
CO2 SERPL-SCNC: 25 MMOL/L (ref 20–32)
COLOR UR AUTO: ABNORMAL
CREAT SERPL-MCNC: 0.68 MG/DL (ref 0.52–1.04)
EC STX1 GENE STL QL NAA+PROBE: NOT DETECTED
EC STX2 GENE STL QL NAA+PROBE: NOT DETECTED
EOSINOPHIL # BLD AUTO: 0.1 10E3/UL (ref 0–0.7)
EOSINOPHIL NFR BLD AUTO: 4 %
ERYTHROCYTE [DISTWIDTH] IN BLOOD BY AUTOMATED COUNT: 14.5 % (ref 10–15)
GFR SERPL CREATININE-BSD FRML MDRD: 81 ML/MIN/1.73M2
GLUCOSE BLD-MCNC: 144 MG/DL (ref 70–99)
GLUCOSE BLDC GLUCOMTR-MCNC: 144 MG/DL (ref 70–99)
GLUCOSE UR STRIP-MCNC: NEGATIVE MG/DL
HCT VFR BLD AUTO: 38.1 % (ref 35–47)
HGB BLD-MCNC: 12.5 G/DL (ref 11.7–15.7)
HGB UR QL STRIP: NEGATIVE
HOLD SPECIMEN: NORMAL
HYALINE CASTS: 10 /LPF
IMM GRANULOCYTES # BLD: 0 10E3/UL
IMM GRANULOCYTES NFR BLD: 0 %
KETONES UR STRIP-MCNC: NEGATIVE MG/DL
LACTATE SERPL-SCNC: 1.4 MMOL/L (ref 0.7–2)
LEUKOCYTE ESTERASE UR QL STRIP: NEGATIVE
LIPASE SERPL-CCNC: 221 U/L (ref 73–393)
LYMPHOCYTES # BLD AUTO: 0.7 10E3/UL (ref 0.8–5.3)
LYMPHOCYTES NFR BLD AUTO: 31 %
MCH RBC QN AUTO: 27.2 PG (ref 26.5–33)
MCHC RBC AUTO-ENTMCNC: 32.8 G/DL (ref 31.5–36.5)
MCV RBC AUTO: 83 FL (ref 78–100)
MONOCYTES # BLD AUTO: 0.3 10E3/UL (ref 0–1.3)
MONOCYTES NFR BLD AUTO: 12 %
MUCOUS THREADS #/AREA URNS LPF: PRESENT /LPF
NEUTROPHILS # BLD AUTO: 1.2 10E3/UL (ref 1.6–8.3)
NEUTROPHILS NFR BLD AUTO: 52 %
NITRATE UR QL: NEGATIVE
NOROV GI+II ORF1-ORF2 JNC STL QL NAA+PR: NOT DETECTED
NRBC # BLD AUTO: 0 10E3/UL
NRBC BLD AUTO-RTO: 0 /100
PH UR STRIP: 5.5 [PH] (ref 5–7)
PLATELET # BLD AUTO: 224 10E3/UL (ref 150–450)
POTASSIUM BLD-SCNC: 3.7 MMOL/L (ref 3.4–5.3)
PROT SERPL-MCNC: 6 G/DL (ref 6.8–8.8)
RBC # BLD AUTO: 4.6 10E6/UL (ref 3.8–5.2)
RBC URINE: <1 /HPF
RVA NSP5 STL QL NAA+PROBE: NOT DETECTED
SALMONELLA SP RPOD STL QL NAA+PROBE: NOT DETECTED
SHIGELLA SP+EIEC IPAH STL QL NAA+PROBE: NOT DETECTED
SODIUM SERPL-SCNC: 136 MMOL/L (ref 133–144)
SP GR UR STRIP: 1.02 (ref 1–1.03)
SQUAMOUS EPITHELIAL: 1 /HPF
TROPONIN I SERPL-MCNC: <0.015 UG/L (ref 0–0.04)
UROBILINOGEN UR STRIP-MCNC: NORMAL MG/DL
V CHOL+PARA RFBL+TRKH+TNAA STL QL NAA+PR: NOT DETECTED
WBC # BLD AUTO: 2.3 10E3/UL (ref 4–11)
WBC URINE: <1 /HPF
Y ENTERO RECN STL QL NAA+PROBE: NOT DETECTED

## 2021-09-19 PROCEDURE — 84484 ASSAY OF TROPONIN QUANT: CPT | Performed by: EMERGENCY MEDICINE

## 2021-09-19 PROCEDURE — 258N000003 HC RX IP 258 OP 636: Performed by: PHYSICIAN ASSISTANT

## 2021-09-19 PROCEDURE — 250N000013 HC RX MED GY IP 250 OP 250 PS 637: Performed by: PHYSICIAN ASSISTANT

## 2021-09-19 PROCEDURE — 83690 ASSAY OF LIPASE: CPT | Performed by: EMERGENCY MEDICINE

## 2021-09-19 PROCEDURE — 96361 HYDRATE IV INFUSION ADD-ON: CPT

## 2021-09-19 PROCEDURE — 250N000011 HC RX IP 250 OP 636: Performed by: EMERGENCY MEDICINE

## 2021-09-19 PROCEDURE — 87493 C DIFF AMPLIFIED PROBE: CPT | Mod: XU | Performed by: EMERGENCY MEDICINE

## 2021-09-19 PROCEDURE — G0378 HOSPITAL OBSERVATION PER HR: HCPCS

## 2021-09-19 PROCEDURE — 36415 COLL VENOUS BLD VENIPUNCTURE: CPT | Performed by: EMERGENCY MEDICINE

## 2021-09-19 PROCEDURE — 87040 BLOOD CULTURE FOR BACTERIA: CPT | Mod: 59 | Performed by: EMERGENCY MEDICINE

## 2021-09-19 PROCEDURE — 83605 ASSAY OF LACTIC ACID: CPT | Performed by: EMERGENCY MEDICINE

## 2021-09-19 PROCEDURE — 87506 IADNA-DNA/RNA PROBE TQ 6-11: CPT | Performed by: EMERGENCY MEDICINE

## 2021-09-19 PROCEDURE — 99292 CRITICAL CARE ADDL 30 MIN: CPT

## 2021-09-19 PROCEDURE — 81001 URINALYSIS AUTO W/SCOPE: CPT | Performed by: EMERGENCY MEDICINE

## 2021-09-19 PROCEDURE — 99291 CRITICAL CARE FIRST HOUR: CPT | Mod: 25

## 2021-09-19 PROCEDURE — 250N000011 HC RX IP 250 OP 636

## 2021-09-19 PROCEDURE — 82330 ASSAY OF CALCIUM: CPT | Performed by: EMERGENCY MEDICINE

## 2021-09-19 PROCEDURE — 99220 PR INITIAL OBSERVATION CARE,LEVEL III: CPT | Performed by: PHYSICIAN ASSISTANT

## 2021-09-19 PROCEDURE — 93005 ELECTROCARDIOGRAM TRACING: CPT

## 2021-09-19 PROCEDURE — 82374 ASSAY BLOOD CARBON DIOXIDE: CPT | Performed by: EMERGENCY MEDICINE

## 2021-09-19 PROCEDURE — 96375 TX/PRO/DX INJ NEW DRUG ADDON: CPT

## 2021-09-19 PROCEDURE — 258N000003 HC RX IP 258 OP 636: Performed by: EMERGENCY MEDICINE

## 2021-09-19 PROCEDURE — 96365 THER/PROPH/DIAG IV INF INIT: CPT

## 2021-09-19 PROCEDURE — 82040 ASSAY OF SERUM ALBUMIN: CPT | Performed by: EMERGENCY MEDICINE

## 2021-09-19 PROCEDURE — 85025 COMPLETE CBC W/AUTO DIFF WBC: CPT | Performed by: EMERGENCY MEDICINE

## 2021-09-19 RX ORDER — SODIUM CHLORIDE, SODIUM LACTATE, POTASSIUM CHLORIDE, CALCIUM CHLORIDE 600; 310; 30; 20 MG/100ML; MG/100ML; MG/100ML; MG/100ML
INJECTION, SOLUTION INTRAVENOUS CONTINUOUS
Status: DISCONTINUED | OUTPATIENT
Start: 2021-09-19 | End: 2021-09-20 | Stop reason: HOSPADM

## 2021-09-19 RX ORDER — HYDROCHLOROTHIAZIDE 25 MG/1
25 TABLET ORAL DAILY
Status: DISCONTINUED | OUTPATIENT
Start: 2021-09-20 | End: 2021-09-20 | Stop reason: HOSPADM

## 2021-09-19 RX ORDER — ONDANSETRON 2 MG/ML
4 INJECTION INTRAMUSCULAR; INTRAVENOUS EVERY 6 HOURS PRN
Status: DISCONTINUED | OUTPATIENT
Start: 2021-09-19 | End: 2021-09-20 | Stop reason: HOSPADM

## 2021-09-19 RX ORDER — CITALOPRAM HYDROBROMIDE 10 MG/1
10 TABLET ORAL EVERY EVENING
Status: DISCONTINUED | OUTPATIENT
Start: 2021-09-19 | End: 2021-09-20 | Stop reason: HOSPADM

## 2021-09-19 RX ORDER — ONDANSETRON 4 MG/1
4 TABLET, ORALLY DISINTEGRATING ORAL EVERY 6 HOURS PRN
Status: DISCONTINUED | OUTPATIENT
Start: 2021-09-19 | End: 2021-09-20 | Stop reason: HOSPADM

## 2021-09-19 RX ORDER — SODIUM CHLORIDE 9 MG/ML
INJECTION, SOLUTION INTRAVENOUS CONTINUOUS
Status: DISCONTINUED | OUTPATIENT
Start: 2021-09-19 | End: 2021-09-20

## 2021-09-19 RX ORDER — PROCHLORPERAZINE MALEATE 5 MG
5 TABLET ORAL EVERY 6 HOURS PRN
Status: DISCONTINUED | OUTPATIENT
Start: 2021-09-19 | End: 2021-09-20 | Stop reason: HOSPADM

## 2021-09-19 RX ORDER — ONDANSETRON 2 MG/ML
INJECTION INTRAMUSCULAR; INTRAVENOUS
Status: DISCONTINUED
Start: 2021-09-19 | End: 2021-09-19

## 2021-09-19 RX ORDER — DONEPEZIL HYDROCHLORIDE 5 MG/1
5 TABLET, FILM COATED ORAL AT BEDTIME
Status: DISCONTINUED | OUTPATIENT
Start: 2021-09-19 | End: 2021-09-20 | Stop reason: HOSPADM

## 2021-09-19 RX ORDER — PROCHLORPERAZINE 25 MG
12.5 SUPPOSITORY, RECTAL RECTAL EVERY 12 HOURS PRN
Status: DISCONTINUED | OUTPATIENT
Start: 2021-09-19 | End: 2021-09-20 | Stop reason: HOSPADM

## 2021-09-19 RX ORDER — CALCIUM GLUCONATE 94 MG/ML
2 INJECTION, SOLUTION INTRAVENOUS ONCE
Status: COMPLETED | OUTPATIENT
Start: 2021-09-19 | End: 2021-09-19

## 2021-09-19 RX ORDER — SODIUM CHLORIDE 9 MG/ML
INJECTION, SOLUTION INTRAVENOUS CONTINUOUS
Status: DISCONTINUED | OUTPATIENT
Start: 2021-09-19 | End: 2021-09-19

## 2021-09-19 RX ORDER — VALSARTAN AND HYDROCHLOROTHIAZIDE 160; 12.5 MG/1; MG/1
2 TABLET, FILM COATED ORAL DAILY
Status: DISCONTINUED | OUTPATIENT
Start: 2021-09-20 | End: 2021-09-19 | Stop reason: CLARIF

## 2021-09-19 RX ORDER — VALSARTAN 160 MG/1
320 TABLET ORAL DAILY
Status: DISCONTINUED | OUTPATIENT
Start: 2021-09-20 | End: 2021-09-20 | Stop reason: HOSPADM

## 2021-09-19 RX ORDER — LIDOCAINE 40 MG/G
CREAM TOPICAL
Status: DISCONTINUED | OUTPATIENT
Start: 2021-09-19 | End: 2021-09-20 | Stop reason: HOSPADM

## 2021-09-19 RX ORDER — ONDANSETRON 2 MG/ML
4 INJECTION INTRAMUSCULAR; INTRAVENOUS ONCE
Status: COMPLETED | OUTPATIENT
Start: 2021-09-19 | End: 2021-09-19

## 2021-09-19 RX ADMIN — CITALOPRAM HYDROBROMIDE 10 MG: 10 TABLET ORAL at 20:32

## 2021-09-19 RX ADMIN — SODIUM CHLORIDE, POTASSIUM CHLORIDE, SODIUM LACTATE AND CALCIUM CHLORIDE: 600; 310; 30; 20 INJECTION, SOLUTION INTRAVENOUS at 16:12

## 2021-09-19 RX ADMIN — SODIUM CHLORIDE 1000 ML: 9 INJECTION, SOLUTION INTRAVENOUS at 09:59

## 2021-09-19 RX ADMIN — ONDANSETRON 4 MG: 2 INJECTION INTRAMUSCULAR; INTRAVENOUS at 10:14

## 2021-09-19 RX ADMIN — SODIUM CHLORIDE: 9 INJECTION, SOLUTION INTRAVENOUS at 11:08

## 2021-09-19 RX ADMIN — SODIUM CHLORIDE 1000 ML: 9 INJECTION, SOLUTION INTRAVENOUS at 10:29

## 2021-09-19 RX ADMIN — SODIUM CHLORIDE 500 ML: 9 INJECTION, SOLUTION INTRAVENOUS at 12:43

## 2021-09-19 RX ADMIN — SODIUM CHLORIDE 500 ML: 9 INJECTION, SOLUTION INTRAVENOUS at 13:30

## 2021-09-19 RX ADMIN — CALCIUM GLUCONATE 2 G: 98 INJECTION, SOLUTION INTRAVENOUS at 11:07

## 2021-09-19 RX ADMIN — DONEPEZIL HYDROCHLORIDE 5 MG: 5 TABLET ORAL at 20:32

## 2021-09-19 ASSESSMENT — ENCOUNTER SYMPTOMS
FEVER: 0
CONFUSION: 1
HEADACHES: 0
COUGH: 0
ABDOMINAL PAIN: 0
DIARRHEA: 0
NAUSEA: 1
DIFFICULTY URINATING: 0
CHILLS: 0

## 2021-09-19 ASSESSMENT — MIFFLIN-ST. JEOR: SCORE: 992.48

## 2021-09-19 NOTE — PROGRESS NOTES
ROOM # 221    Living Situation (if not independent, order SW consult): [facility]   Facility name:  : [facility] [phone]    Activity level at baseline: Independent with walker, uses wheelchair for long distances  Activity level on admit: Assist of 2 with walker       Patient registered to observation; given Patient Bill of Rights; given the opportunity to ask questions about observation status and their plan of care.  Patient has been oriented to the observation room, bathroom and call light is in place.    Discussed discharge goals and expectations with patient/family.

## 2021-09-19 NOTE — PLAN OF CARE
PRIMARY DIAGNOSIS: ACCIDENTAL DRUG OVERDOSE   OUTPATIENT/OBSERVATION GOALS TO BE MET BEFORE DISCHARGE:  ADLs back to baseline: No    Activity and level of assistance: Assist of 2 with walker     Pain status: Pain free.    Return to near baseline physical activity: No    Patient alert and oriented to self, place, and situation- disoriented to time. Vitals are Temp: 97.4  F (36.3  C) Temp src: Axillary BP: 103/76 Pulse: 66   Resp: 18 SpO2: 98 % RA. Denies pain. Continuing with supportive cares and symptom management.     Discharge Planner Nurse   Safe discharge environment identified: Yes  Barriers to discharge: Yes       Entered by: Starla Kelly 09/19/2021 5:33 PM     Please review provider order for any additional goals.   Nurse to notify provider when observation goals have been met and patient is ready for discharge.

## 2021-09-19 NOTE — H&P
[facility] Outpatient / Observation Unit  History and Physical Exam     Edmundo James MRN# [id number]   YOB: 1935 Age: 85 year old      Date of Admission:  9/19/2021    Primary care provider: Dolores Babb          Assessment:   Edmundo James is a 85 year old female with a PMH significant for alzheimer's dementia, HTN, HLD, OA, asthma, and depression, who presents after accidental ingestion of multiple medications (see below). The patient lives at [facility] with her  and apparently she was erroneously given her 's morning medications (listed below) at 820am today. She then became unresponsive so EMS was called. Her BP was initially 52/27 and glc was within normal limits. She was given IVF and brought to the ER for care.     Bumex 1 mg  Carvedilol 25 mg  Clonidine 0.1 mg  Hydralazine 100 mg  Metolazone 2.5 mg  Flomax 0.2 mg  Doxycycline 100 mg  Tylenol 500 mg  Protonix    Initial work up in the ED reveals: BP 64//59, Pulse ranged from 35-78 and Resp 9-30. She was given 3L NS IVF boluses. Her BP and pulse were quite low on initial presentation, but slowly improved with IVF bolus on NS running at 200cc/hour. Her pulse would miriam down to the 30s when she would retch (she was having nausea) but bounce back quickly. Poison Control was contacted and recommended supportive cares, the peak effect of her meds were thought to be at 4 hours (so around 1230p). She was given some calcium gluconate for a slightly low CA level as well.  Pertinent lab results include: CBC: WBC: 2.3 (L) , HGB: 12.5, PLT: 224   CMP: Glucose 144 (H) , Calcium: 8.2 (L) , Albumin: 2.9 (L) , Protein Total: 6 (L) , o/w WNL (Creatinine: 0.68). UA: Protein Albumin: 10 (A), Mucous: Present, Hyaline Casts: 10 (H), o/w Negative. Troponin (Collected 0948): <0.015. Lactic acid (Resulted 0959): 1.4. Lipase: 221. Glucose by meter (0944): 144 (H). Blood Cultures x2: Pending. Enteric Bacteria and Virus Panel  by JONY Stool: Pending. C. Diff PCR: Pending.     Patient will be registered to Observation for further evaluation and symptom management.     1. Accidental ingestion of multiple blood pressure medications - patient was given multiple BP medications on accident at her [facility] this morning. She has stabilized in the ER after 3.5L boluses and NS running at 200cc/hour.   - Will start LR at 150cc/hour for now, titrate based on BP needs  - will hold all meds for now  - she is already reporting she is feeling better and her BP has been more stable. Nausea has resolved. Per poison control, the meds peak at 4 hours (which was around 1230p), so expect continued improvement overnight.   - recheck a BMP in the AM     2. Diarrhea - started when she was diagnosed with COVID-19 on 9/3, this was her only symptom. Still having loose stool but is improving. Was negative for c diff at [facility], stool sent again today in the ER.   - enteric precautions  - ADAT  - c diff and enteric panels pending    3. Covid-19 - recovered. Was diagnosed on 9/3/2021 at her [facility]. She is vaccinated (Pfizer in Jan and Feb 2021)     4. HTN - hold her PTA valsartan/hctz  5. HLD - hold PTA lipitor for now  6. Depression - hold her PTA celexa for now  7. Dementia - hold PTA Aricept for now                Chief Complaint:   Accidental ingestion of multiple medications          History of Present Illness:   Edmundo James is a 85 year old female with a PMH significant for alzheimer's dementia, HTN, HLD, OA, asthma, and depression, who presents after accidental ingestion of multiple medications (see below). [facility]  and apparently she was erroneously given her 's morning medications (listed below) at 820am today. She then became unresponsive so EMS was called. Her BP was initially 52/27 and glc was within normal limits. She was given IVF and brought to the ER for care.   She is feeling better already, not at  baseline physically but mentally is back to baseline. Feeling tired overall. Nausea has improved. Reporting persisting loose stool since COVID infection on 9/3. Frequency and consistency have improved but still present. Did have ABX recently for a UTI but states she was tested for c diff at [facility] and this was negative.               Past Medical History:     Past Medical History:   Diagnosis Date     Heart disease      HTN (hypertension)      Hyperlipidemia      OA (osteoarthritis) of knee      Uncomplicated asthma            Past Surgical History:     Past Surgical History:   Procedure Laterality Date     CHOLECYSTECTOMY       LUMPECTOMY BREAST       TONSILLECTOMY             Social History:     Social History     Socioeconomic History     Marital status:      Spouse name: Not on file     Number of children: Not on file     Years of education: Not on file     Highest education level: Not on file   Occupational History     Not on file   Tobacco Use     Smoking status: Never Smoker     Smokeless tobacco: Never Used   Substance and Sexual Activity     Alcohol use: Yes     Alcohol/week: 0.0 standard drinks     Drug use: No     Sexual activity: Yes     Partners: Male   Other Topics Concern     Not on file   Social History Narrative     Not on file     Social Determinants of Health     Financial Resource Strain:      Difficulty of Paying Living Expenses:    Food Insecurity:      Worried About Running Out of Food in the Last Year:      Ran Out of Food in the Last Year:    Transportation Needs:      Lack of Transportation (Medical):      Lack of Transportation (Non-Medical):    Physical Activity:      Days of Exercise per Week:      Minutes of Exercise per Session:    Stress:      Feeling of Stress :    Social Connections:      Frequency of Communication with Friends and Family:      Frequency of Social Gatherings with Friends and Family:      Attends Buddhist Services:      Active Member of Clubs or Organizations:       Attends Club or Organization Meetings:      Marital Status:    Intimate Partner Violence:      Fear of Current or Ex-Partner:      Emotionally Abused:      Physically Abused:      Sexually Abused:            Family History:     Family History   Problem Relation Age of Onset     Hypertension Mother      Heart Disease Father      Hypertension Father           Allergies:      Allergies   Allergen Reactions     Cats Other (See Comments)     Runny nose, watery eyes      Dogs Other (See Comments)     Runny nose and watery eyes      Mold Other (See Comments)     Runny nose, watery eyes            Medications:     Prior to Admission medications    Medication Sig Last Dose Taking? Auth Provider   albuterol (PROAIR HFA/PROVENTIL HFA/VENTOLIN HFA) 108 (90 Base) MCG/ACT inhaler Inhale 2 puffs into the lungs every 4 hours as needed for shortness of breath / dyspnea or wheezing  at PRN Yes Dolores Babb APRN CNP   Atorvastatin Calcium (LIPITOR PO) Take 20 mg by mouth daily  9/18/2021 at Unknown time Yes Reported, Patient   diclofenac (VOLTAREN) 1 % topical gel Apply 2 g topically 2 times daily. May also apply 2 g 2 times daily as needed for moderate pain. 9/19/2021 at Unknown time Yes Dolores Babb APRN CNP   potassium chloride ER (K-TAB) 20 MEQ CR tablet Take 1 tablet (20 mEq) by mouth daily 9/19/2021 at Unknown time Yes Dolores Babb APRN CNP   vitamin D3 (CHOLECALCIFEROL) 50 mcg (2000 units) tablet Take 1 tablet (50 mcg) by mouth daily 9/19/2021 at Unknown time Yes Dolores Babb APRN CNP   ACETAMINOPHEN EXTRA STRENGTH 500 MG tablet TAKE TWO TABLETS (1000MG) BY MOUTH TWICE DAILY;AND MAY TAKE TWO TABLETS (1000MG) BY MOUTH ONCE DAILY AS NEEDED FOR PAIN   Dolores Babb APRN CNP   atorvastatin (LIPITOR) 20 MG tablet TAKE 1 TABLET BY MOUTH ONCE DAILY   Dolores Babb APRN CNP   citalopram (CELEXA) 10 MG tablet TAKE 1 TABLET BY MOUTH ONCE DAILY   Dolores Babb APRN CNP    donepezil (ARICEPT) 5 MG tablet TAKE 1 TABLET BY MOUTH ONCE DAILY   Dolores Babb APRN CNP   valsartan-hydrochlorothiazide (DIOVAN HCT) 320-25 MG tablet TAKE 1 TABLET BY MOUTH ONCE DAILY   Dolores Babb APRN CNP          Review of Systems:   A Comprehensive greater than 10 system review of systems was carried out.  Pertinent positives and negatives are noted above.  Otherwise negative for contributory information.     CONSTITUTIONAL:NEGATIVE for fever, chills, change in weight  EYES: NEGATIVE for vision changes or irritation  ENT/MOUTH: NEGATIVE for ear, mouth and throat problems  RESP:NEGATIVE for significant cough or SOB  CV: NEGATIVE for chest pain, palpitations or peripheral edema  GI: NEGATIVE for nausea, abdominal pain, heartburn, or change in bowel habits  : normal menstrual cycles  MUSCULOSKELETAL: NEGATIVE for significant arthralgias or myalgia  NEURO: NEGATIVE for weakness, dizziness or paresthesias         Physical Exam:   Blood pressure 91/62, pulse 64, temperature (!) 96.7  F (35.9  C), temperature source Temporal, resp. rate 15, SpO2 97 %.    GENERAL: healthy, alert and no distress, non-toxic appearing  EYES: Eyes grossly normal to inspection, extraocular movements - intact, and PERRL  HENT: Nose- normal; Mouth- no ulcers, no lesions.   ORAL MUCOSA: otherwise within normal limits, with no lesions and straight vibratory margins  NECK: no tenderness, no adenopathy, no asymmetry, no masses, no stiffness; thyroid- normal to palpation  RESP: Normal - Clear to auscultation without rales, rhonchi, or wheezing. and bilateral air exchange present  CV: RRR normal S1S2 without  murmurs, rubs or gallops. PMI normal  ABDOMEN: soft, nontender, without hepatosplenomegaly or masses  MS: extremities- no gross deformities noted, no edema  SKIN: no suspicious lesions, no rashes  NEURO: strength and tone- normal, sensory exam- grossly normal, mentation- intact, speech- normal, reflexes-  symmetric  BACK: no CVA tenderness, no paralumbar tenderness  PSYCH: Alert and oriented times 3. Affect is normal.         Data:     No results for input(s): PH, PHV, PO2, PO2V, SAT, PCO2, PCO2V, HCO3, HCO3V in the last 168 hours.  Recent Labs   Lab 09/19/21 0948   WBC 2.3*   HGB 12.5   HCT 38.1   MCV 83             Lab Results   Component Value Date     09/19/2021     09/14/2021     08/31/2021    Lab Results   Component Value Date    CHLORIDE 102 09/19/2021    CHLORIDE 97 08/31/2021    Lab Results   Component Value Date    BUN 20 09/19/2021    BUN 14 08/31/2021      Lab Results   Component Value Date    POTASSIUM 3.7 09/19/2021    POTASSIUM 3.8 09/14/2021    POTASSIUM 3.1 08/31/2021    Lab Results   Component Value Date    CO2 25 09/19/2021    CO2 29 08/31/2021    Lab Results   Component Value Date    CR 0.68 09/19/2021    CR 0.64 08/31/2021        No results for input(s): CULT in the last 168 hours.  Recent Labs   Lab 09/19/21 0948 09/19/21  0944 09/14/21 0821     --  133   POTASSIUM 3.7  --  3.8   CHLORIDE 102  --   --    CO2 25  --   --    ANIONGAP 9  --   --    * 144*  --    BUN 20  --   --    CR 0.68  --   --    GFRESTIMATED 81  --   --    RANDALL 8.2*  --   --    PROTTOTAL 6.0*  --   --    ALBUMIN 2.9*  --   --    BILITOTAL 0.5  --   --    ALKPHOS 52  --   --    AST 25  --   --    ALT 33  --   --      No results for input(s): DD in the last 168 hours.  Recent Labs   Lab 09/19/21  0948 09/19/21  0944 09/14/21 0821     --  133   POTASSIUM 3.7  --  3.8   CHLORIDE 102  --   --    CO2 25  --   --    * 144*  --      No results for input(s): SED, CRP in the last 168 hours.  No results for input(s): INR in the last 168 hours.  Recent Labs   Lab 09/19/21  0948   LACT 1.4     Recent Labs   Lab 09/19/21  0948   LIPASE 221     No results for input(s): TSH in the last 168 hours.  Recent Labs   Lab 09/19/21  0948   TROPONIN <0.015     Recent Labs   Lab 09/19/21  1007    COLOR Light Yellow   APPEARANCE Clear   URINEGLC Negative   URINEBILI Negative   URINEKETONE Negative   SG 1.016   UBLD Negative   URINEPH 5.5   PROTEIN 10 *   NITRITE Negative   LEUKEST Negative   RBCU <1   WBCU <1         No results found for this or any previous visit (from the past 48 hour(s)).    Yesenia Monsalve PA-C

## 2021-09-19 NOTE — DOWNTIME EVENT NOTE
Admission medication history interview status for this patient is complete. See [facility] admission navigator for allergy information, prior to admission medications and immunization status.     Medication history interview done, indicate source(s): Patient's daughter  Medication history resources (including written lists, pill bottles, clinic record): MAR from the patient's group home  Pharmacy: Unknown    Changes made to PTA medication list:  Added: None  Changed: None  Reported as Not Taking: None  Removed: None    Actions taken by pharmacist (provider contacted, etc):None     Additional medication history information:None    Medication reconciliation/reorder completed by provider prior to medication history?  No      Prior to Admission medications    Medication Sig Last Dose Taking? Auth Provider   ACETAMINOPHEN EXTRA STRENGTH 500 MG tablet TAKE TWO TABLETS (1000MG) BY MOUTH TWICE DAILY;AND MAY TAKE TWO TABLETS (1000MG) BY MOUTH ONCE DAILY AS NEEDED FOR PAIN 9/19/2021 at 0822 Yes Dolores Babb APRN CNP   albuterol (PROAIR HFA/PROVENTIL HFA/VENTOLIN HFA) 108 (90 Base) MCG/ACT inhaler Inhale 2 puffs into the lungs every 4 hours as needed for shortness of breath / dyspnea or wheezing  at PRN Yes Dolores Babb APRN CNP   atorvastatin (LIPITOR) 20 MG tablet TAKE 1 TABLET BY MOUTH ONCE DAILY 9/18/2021 at 2000 Yes Dolores Babb APRN CNP   citalopram (CELEXA) 10 MG tablet TAKE 1 TABLET BY MOUTH ONCE DAILY 9/18/2021 at 2000 Yes Dolores Babb APRN CNP   diclofenac (VOLTAREN) 1 % topical gel Apply 2 g topically 2 times daily. May also apply 2 g 2 times daily as needed for moderate pain. 9/19/2021 at 0822 Yes Dolores Babb APRN CNP   donepezil (ARICEPT) 5 MG tablet TAKE 1 TABLET BY MOUTH ONCE DAILY 9/18/2021 at 2000 Yes Dolores Babb APRN CNP   potassium chloride ER (K-TAB) 20 MEQ CR tablet Take 1 tablet (20 mEq) by mouth daily 9/19/2021 at 0822 Yes Dolores Babb  APRN CNP   valsartan-hydrochlorothiazide (DIOVAN HCT) 320-25 MG tablet TAKE 1 TABLET BY MOUTH ONCE DAILY 9/19/2021 at 0822 Yes Dolores Babb APRN CNP   vitamin D3 (CHOLECALCIFEROL) 50 mcg (2000 units) tablet Take 1 tablet (50 mcg) by mouth daily 9/19/2021 at 0822 Yes Dolores Babb APRN CNP

## 2021-09-19 NOTE — ED NOTES
[facility]  ED Nurse Handoff Report    Edmundo James is a 85 year old female   ED Chief complaint: Hypotension  . ED Diagnosis:   Final diagnoses:   None     Allergies:   Allergies   Allergen Reactions     Cats Other (See Comments)     Runny nose, watery eyes      Dogs Other (See Comments)     Runny nose and watery eyes      Mold Other (See Comments)     Runny nose, watery eyes        Code Status: DNR / DNI  Activity level - Baseline/Home:  Assist X 1. Activity Level - Current:   Assist of 2. Lift room needed: No. Bariatric: No   Needed: No   Isolation: Yes. Infection: COVID+  C-Diff Pending.     Vital Signs:   Vitals:    09/19/21 1100 09/19/21 1105 09/19/21 1110 09/19/21 1115   BP: (!) 83/54 90/55 (!) 86/55 99/57   Pulse: 67 66 64 65   Resp: 11 18 19 15   Temp:       TempSrc:       SpO2: 98% 98% 98% 98%       Cardiac Rhythm:  ,      Pain level:    Patient confused: Yes. Patient Falls Risk: Yes.   Elimination Status: Has voided   Patient Report - Initial Complaint: hypotension. Focused Assessment: 8:20am pt accidentally took husbands BP medications. Found confused and lethargic. Hypotensive. Hx Dementia   Tests Performed: labs, stool sample sent. Abnormal Results: . NA  Treatments provided: IV NS, calcium gluconate IV  Family Comments: Daughter at bedside  OBS brochure/video discussed/provided to patient:  Yes  ED Medications:   Medications   0.9% sodium chloride BOLUS (has no administration in time range)   sodium chloride 0.9% infusion ( Intravenous New Bag 9/19/21 1108)   0.9% sodium chloride BOLUS (0 mLs Intravenous Stopped 9/19/21 1109)   0.9% sodium chloride BOLUS (0 mLs Intravenous Stopped 9/19/21 1028)   0.9% sodium chloride BOLUS (0 mLs Intravenous Stopped 9/19/21 1014)   ondansetron (ZOFRAN) injection 4 mg (4 mg Intravenous Given 9/19/21 1014)   calcium gluconate 10 % injection 2 g (2 g Intravenous New Bag 9/19/21 1107)     Drips infusing:  Yes  For the majority of the shift,  the patient's behavior Green. Interventions performed were NA.    Sepsis treatment initiated: No     Patient tested for COVID 19 prior to admission: No pt positive 9/3/21    ED Nurse Name/Phone Number: Halima Smith RN,   11:25 AM      RECEIVING UNIT ED HANDOFF REVIEW    Above ED Nurse Handoff Report was reviewed: Yes  Reviewed by: Starla Kelly RN on September 19, 2021 at 3:21 PM

## 2021-09-19 NOTE — PROGRESS NOTES
Responded to [employer] to assist with respiratory support.    Pt placed on 4L NC with sat's of 97%.    No further respiratory interventions necessary at this time.    Daniella Barron, RT on 9/19/2021 at 10:03 AM

## 2021-09-19 NOTE — ED TRIAGE NOTES
ABCs intact. Pt's  was unable to wake up pt this morning. EMS states BP 52/27. Pt has a BM, c/o nausea. Pt BIBA. /60 prior to arrival.

## 2021-09-19 NOTE — ED PROVIDER NOTES
History   Chief Complaint:  Hypotension       The history is provided by the EMS personnel and the patient.      Edmundo James is a 85 year old female with history of hypertension and hyperlipidemia who presents with hypotension. Per EMS,  had trouble waking up the patient this morning.  states patient was fine last night. Initial BP 52/27. Sugar was fine. En route, patient was able to wake up and talk to the paramedics. She had an episode of diarrhea. /60 prior to arrival. Here in the ED, patient's only complaint was nausea. She is able to answer questions. She is unsure of the month and year. She denies being sick recently. No fever, cough, or diarrhea. No trouble urinating. No abdominal pain. Of note, patient was tested COVID positive on 9/3/21. Patient DNR/DNI.     After speaking with the daughter on phone, patient has been having diarrhea over the last few days. She had COVID symptoms for a while without any respiratory symptoms. Daughter is on her way to the ED.     The assisted living accidentally gave the patient her 's medications this morning around 0820.  Staff from [facility] spoke to me over the telephone states that the patient was completely at her baseline at 820 when she received the medication and then became unresponsive after that.  She received the following:  Bumex 1 mg  Carvedilol 25 mg  Clonidine 0.1 mg  Hydralazine 100 mg  Metolazone 2.5 mg  Flomax 0.2 mg  Doxycycline 100 mg  Tylenol 500 mg  Protonix    Review of Systems   Constitutional: Negative for chills and fever.   HENT: Negative for congestion.    Respiratory: Negative for cough.    Cardiovascular: Negative for chest pain.   Gastrointestinal: Positive for nausea. Negative for abdominal pain and diarrhea.   Genitourinary: Negative for difficulty urinating.   Neurological: Negative for headaches.   Psychiatric/Behavioral: Positive for confusion.   All other systems reviewed and are  negative.        Allergies:  The patient has no known drug allergies.     Medications:  Albuterol  Celexa  Aricept  Potassium chloride  Diovan   Lipitor  Flomax    Past Medical History:    Heart disease  Hypertension   Hyperlipidemia   OA  Asthma    Past Surgical History:    Cholecystectomy  Lumpectomy   Tonsillectomy     Family History:    Father: heart disease, hypertension  Mother: hypertension     Social History:  Patient presents to the ED via EMS.  Patient lives at an apartment with .     Physical Exam     Patient Vitals for the past 24 hrs:   BP Temp Temp src Pulse Resp SpO2   09/19/21 1115 99/57 -- -- 65 15 98 %   09/19/21 1110 (!) 86/55 -- -- 64 19 98 %   09/19/21 1105 90/55 -- -- 66 18 98 %   09/19/21 1100 (!) 83/54 -- -- 67 11 98 %   09/19/21 1055 (!) 83/51 -- -- 67 23 98 %   09/19/21 1050 (!) 78/49 -- -- 64 16 97 %   09/19/21 1045 (!) 78/50 -- -- 65 11 97 %   09/19/21 1040 (!) 80/49 -- -- 65 30 97 %   09/19/21 1036 -- -- -- (!) 35 19 98 %   09/19/21 1035 (!) 80/69 -- -- 61 15 98 %   09/19/21 1030 (!) 74/45 -- -- 62 15 97 %   09/19/21 1025 (!) 77/49 -- -- 64 13 97 %   09/19/21 1020 (!) 80/48 -- -- 62 14 97 %   09/19/21 1015 (!) 84/50 -- -- 61 17 98 %   09/19/21 1011 101/56 -- -- 73 24 98 %   09/19/21 1010 101/56 -- -- 59 17 98 %   09/19/21 1005 (!) 89/47 -- -- 64 12 98 %   09/19/21 1000 -- -- -- 68 26 97 %   09/19/21 0955 (!) 73/39 -- -- 61 14 96 %   09/19/21 0950 (!) 76/50 -- -- 58 21 91 %   09/19/21 0948 -- -- -- (!) 41 17 97 %   09/19/21 0947 -- (!) 96.7  F (35.9  C) Temporal -- -- --   09/19/21 0945 (!) 64/40 -- -- 55 21 94 %   09/19/21 0940 (!) 64/42 -- -- 54 -- 94 %       Physical Exam  Constitutional: Non toxic appearing.  HEENT: Atraumatic.  PERRL.  EOMI.  Moist mucous membranes.  Neck: Soft.  Supple.    Cardiac: Regular rate and rhythm.  No murmur or rub.  Respiratory: Clear to auscultation bilaterally.  No respiratory distress.  No wheezing, rhonchi, or rales.  Abdomen: Soft and  nontender.  No guarding.  Nondistended.  Musculoskeletal: No edema.  Normal range of motion.  Neurologic: Alert and oriented self and the fact that she is in her hospital as well as oriented to dollar otherwise states she does not know to orientation questions.  Normal tone and bulk.  No facial drooping.  Normal speech.  5/5 strength in bilateral upper and lower extremities.  Sensation to light touch intact throughout.   Skin: No rashes.  No edema.  Psych: Some symptoms consistent with mild dementia, otherwise normal affect.  Normal behavior.      Emergency Department Course     ECG:  ECG taken at 0949. ECG read at 0950.  Normal sinus rhythm. Left axis deviation. Prolonged QT. Abnormal ECG.   Rate 73 bpm. MS interval 186 ms. QRS duration 92 ms. QT/QTc 458/504 ms. P-R-T axes 41 -37 21.     Laboratory:   CBC: WBC: 2.3 (L) , HGB: 12.5, PLT: 224    CMP: Glucose 144 (H) , Calcium: 8.2 (L) , Albumin: 2.9 (L) , Protein Total: 6 (L) , o/w WNL (Creatinine: 0.68)    UA: Protein Albumin: 10 (A), Mucous: Present, Hyaline Casts: 10 (H), o/w Negative    Troponin (Collected 0948): <0.015    Lactic acid (Resulted 0959): 1.4    Lipase: 221    Glucose by meter (0944): 144 (H)     Blood Cultures x2: Pending       Enteric Bacteria and Virus Panel by JONY Stool: Pending    C. Diff PCR: Pending    Emergency Department Course:    0938 I obtained history and examined the patient as noted above.   0959 NS 1L IV  0959 NS 1L IV  0959 I talked to the patient's daughter, Kandice.   1004 I rechecked the patient.   1014 Zofran 4 mg IV  1018 I talked to staff from [facility]. They gave me the 's contact.   1026 I spoke with the . The assisted living accidentally gave the patient her 's  medication this morning at 0820.   1029 BP 80/48  1029 NS 1L IV  1036 Heart rate 36. Patient is still vomiting.   1040 I spoke with poison control.   1107 Calcium gluconate 10% 2 g IV  1108 NS 1L IV  1121 I spoke with Kandice here at the ED.    1218 I consulted with Dr. Crawford, hospitalist, regarding the patient's history and presentation in the ED.   1243  mL IV    Disposition:  The patient was admitted to the hospital under the care of Dr. Crawford.       Impression & Plan     CMS Diagnoses: None    Medical Decision Making:  Edmundo James is an 82-year-old woman who presents for hypotension and unresponsiveness.  Her mental status is improved en route and she is answering questions with no focal neurologic deficits.  IV fluids on pressure bags were initiated and 2 IVs were obtained.  Lab work-up was obtained and is noted as above.  He was later found out that she was accidentally given her 's medications which included a large amount of antihypertensive medications and diuretics which the patient normally does not take.  This likely explains her hypotensive episode and unresponsiveness.  Her lab work-up is reassuring.  She had recent Covid infection likely explain her leukopenia.  There is no obvious signs of active infection at this time.  She did have diarrhea present on arrival but no further episodes.  She did have antibiotics within the last 1 month and C. difficile was sent off but not resulted yet.  I held off on antibiotics as she has a normal lactic acid and no obvious source of infection at this time, Blood pressure stabilized initially after fluids and became hypotensive again but responded well to fluids and she not requires pressor support.  I did speak with poison control who recommended supportive care at home and normalizing her calcium level and she was given 2 g of calcium gluconate.  Recheck ionized calcium is within normal limits.  Poison control did contact me again and no further recommendations at this time as her blood pressure has been stable in the 90s.  She has no bradycardia at rest but had a few episodes of vaguely down become bradycardic with dry heaving and nausea but quickly went back to her normal sinus  rhythm.  I spoke with the daughter and patient about the plan for admission and they are in agreement.  I spoke with hospitalist service who accepts her to the observation unit and she is stable condition at time of admission.    Covid-19  Edmundo James was evaluated during a global COVID-19 pandemic, which necessitated consideration that the patient might be at risk for infection with the SARS-CoV-2 virus that causes COVID-19.   Applicable protocols for evaluation were followed during the patient's care.   COVID-19 was considered as part of the patient's evaluation. The plan for testing is:  a test was obtained at a previous visit and reviewed & considered today.      Diagnosis:    ICD-10-CM    1. Hypotension, unspecified hypotension type  I95.9    2. Accidental drug overdose, initial encounter  T50.901A    3. Diarrhea, unspecified type  R19.7          Scribe Disclosure:  I, Janna Wright, am serving as a scribe at 9:41 AM on 9/19/2021 to document services personally performed by Gabriele Gonzáles MD based on my observations and the provider's statements to me.            Gabriele Gonzáles MD  09/19/21 [id number]

## 2021-09-20 ENCOUNTER — LAB REQUISITION (OUTPATIENT)
Dept: LAB | Facility: CLINIC | Age: 86
End: 2021-09-20
Payer: MEDICARE

## 2021-09-20 VITALS
HEART RATE: 58 BPM | OXYGEN SATURATION: 95 % | TEMPERATURE: 98.3 F | WEIGHT: 128.8 LBS | BODY MASS INDEX: 23.7 KG/M2 | HEIGHT: 62 IN | DIASTOLIC BLOOD PRESSURE: 75 MMHG | RESPIRATION RATE: 18 BRPM | SYSTOLIC BLOOD PRESSURE: 111 MMHG

## 2021-09-20 DIAGNOSIS — I50.32 CHRONIC DIASTOLIC (CONGESTIVE) HEART FAILURE (H): ICD-10-CM

## 2021-09-20 LAB
ANION GAP SERPL CALCULATED.3IONS-SCNC: 8 MMOL/L (ref 3–14)
ATRIAL RATE - MUSE: 73 BPM
BUN SERPL-MCNC: 17 MG/DL (ref 7–30)
CALCIUM SERPL-MCNC: 8.4 MG/DL (ref 8.5–10.1)
CHLORIDE BLD-SCNC: 106 MMOL/L (ref 94–109)
CO2 SERPL-SCNC: 23 MMOL/L (ref 20–32)
CREAT SERPL-MCNC: 0.64 MG/DL (ref 0.52–1.04)
DIASTOLIC BLOOD PRESSURE - MUSE: NORMAL MMHG
GFR SERPL CREATININE-BSD FRML MDRD: 83 ML/MIN/1.73M2
GLUCOSE BLD-MCNC: 90 MG/DL (ref 70–99)
INTERPRETATION ECG - MUSE: NORMAL
P AXIS - MUSE: 41 DEGREES
POTASSIUM BLD-SCNC: 3 MMOL/L (ref 3.4–5.3)
POTASSIUM BLD-SCNC: 3.1 MMOL/L (ref 3.4–5.3)
POTASSIUM BLD-SCNC: 3.6 MMOL/L (ref 3.4–5.3)
PR INTERVAL - MUSE: 186 MS
QRS DURATION - MUSE: 92 MS
QT - MUSE: 458 MS
QTC - MUSE: 504 MS
R AXIS - MUSE: -37 DEGREES
SODIUM SERPL-SCNC: 137 MMOL/L (ref 133–144)
SYSTOLIC BLOOD PRESSURE - MUSE: NORMAL MMHG
T AXIS - MUSE: 21 DEGREES
VENTRICULAR RATE- MUSE: 73 BPM

## 2021-09-20 PROCEDURE — G0378 HOSPITAL OBSERVATION PER HR: HCPCS

## 2021-09-20 PROCEDURE — 84132 ASSAY OF SERUM POTASSIUM: CPT | Performed by: PHYSICIAN ASSISTANT

## 2021-09-20 PROCEDURE — 36415 COLL VENOUS BLD VENIPUNCTURE: CPT | Performed by: PHYSICIAN ASSISTANT

## 2021-09-20 PROCEDURE — 99217 PR OBSERVATION CARE DISCHARGE: CPT | Performed by: PHYSICIAN ASSISTANT

## 2021-09-20 PROCEDURE — 250N000013 HC RX MED GY IP 250 OP 250 PS 637: Performed by: PHYSICIAN ASSISTANT

## 2021-09-20 PROCEDURE — 80048 BASIC METABOLIC PNL TOTAL CA: CPT | Performed by: PHYSICIAN ASSISTANT

## 2021-09-20 PROCEDURE — 258N000003 HC RX IP 258 OP 636: Performed by: PHYSICIAN ASSISTANT

## 2021-09-20 RX ORDER — POTASSIUM CHLORIDE 1500 MG/1
40 TABLET, EXTENDED RELEASE ORAL ONCE
Status: COMPLETED | OUTPATIENT
Start: 2021-09-20 | End: 2021-09-20

## 2021-09-20 RX ORDER — VALSARTAN AND HYDROCHLOROTHIAZIDE 320; 25 MG/1; MG/1
1 TABLET, FILM COATED ORAL DAILY
Qty: 28 TABLET
Start: 2021-09-21 | End: 2021-09-21 | Stop reason: DRUGHIGH

## 2021-09-20 RX ADMIN — SODIUM CHLORIDE, POTASSIUM CHLORIDE, SODIUM LACTATE AND CALCIUM CHLORIDE: 600; 310; 30; 20 INJECTION, SOLUTION INTRAVENOUS at 01:04

## 2021-09-20 RX ADMIN — POTASSIUM CHLORIDE 40 MEQ: 1500 TABLET, EXTENDED RELEASE ORAL at 08:38

## 2021-09-20 NOTE — PLAN OF CARE
Patient's After Visit Summary was reviewed with patient and/or daughter.   Patient verbalized understanding of After Visit Summary, recommended follow up and was given an opportunity to ask questions.   Discharge medications sent home with patient/family: Not applicable   Discharged with daughter    Discharged back to [facility] with daughter transport.

## 2021-09-20 NOTE — PLAN OF CARE
PRIMARY DIAGNOSIS: ACCIDENTAL DRUG OVERDOSE   OUTPATIENT/OBSERVATION GOALS TO BE MET BEFORE DISCHARGE:  ADLs back to baseline: Yes    Activity and level of assistance: Up with maximum assistance. Consider SW and/or PT evaluation.     Pain status: Improved-controlled with oral pain medications.    Return to near baseline physical activity: No       Discharge Planner Nurse   Safe discharge environment identified: Yes  Barriers to discharge: Yes       Entered by: Cinthya Olea 09/19/2021 8:43 PM     Please review provider order for any additional goals.   Nurse to notify provider when observation goals have been met and patient is ready for discharge.

## 2021-09-20 NOTE — PLAN OF CARE
PRIMARY DIAGNOSIS: ACCIDENTAL DRUG OVERDOSE   OUTPATIENT/OBSERVATION GOALS TO BE MET BEFORE DISCHARGE:  1. ADLs back to baseline: Yes    2. Activity and level of assistance: Up with maximum assistance. Consider SW and/or PT evaluation.     3. Pain status: Improved-controlled with oral pain medications.    4. Return to near baseline physical activity: No     Vitals are Temp: 98  F (36.7  C) Temp src: Oral BP: 104/73 Pulse: 53   Resp: 16 SpO2: 93 %.    Patient is Alert and Oriented x4. Denies any pain with interview. She is 1 Assist with Gait Belt and Walker.  Pt is a Regular diet. Patient has Lactated Ringer's running at 150 mL per hour. Plan is to continue to monitor and provide supportive cares.       Discharge Planner Nurse   Safe discharge environment identified: Yes  Barriers to discharge: Yes       Entered by: Cinthya Olea 09/20/2021 4:52 AM     Please review provider order for any additional goals.   Nurse to notify provider when observation goals have been met and patient is ready for discharge.

## 2021-09-20 NOTE — PLAN OF CARE
PRIMARY DIAGNOSIS: ACCIDENTAL DRUG OVERDOSE   OUTPATIENT/OBSERVATION GOALS TO BE MET BEFORE DISCHARGE:  1. ADLs back to baseline: Yes    2. Activity and level of assistance: Up with maximum assistance. Consider SW and/or PT evaluation.     3. Pain status: Improved-controlled with oral pain medications.    4. Return to near baseline physical activity: No     Vitals are Temp: 98  F (36.7  C) Temp src: Oral BP: 104/73 Pulse: 53   Resp: 16 SpO2: 93 %.    Patient is Alert and Oriented x4. Denies any pain with interview. She is 1 Assist with Gait Belt and Walker.  Pt is a Regular diet. Patient has Lactated Ringer's running at 150 mL per hour.Will Continue to monitor.      Discharge Planner Nurse   Safe discharge environment identified: Yes  Barriers to discharge: Yes       Entered by: Cinthya Olea 09/20/2021 2:41 AM     Please review provider order for any additional goals.   Nurse to notify provider when observation goals have been met and patient is ready for discharge.

## 2021-09-20 NOTE — PLAN OF CARE
PRIMARY DIAGNOSIS: ACCIDENTAL DRUG OVERDOSE   OUTPATIENT/OBSERVATION GOALS TO BE MET BEFORE DISCHARGE:  1. ADLs back to baseline: No    2. Activity and level of assistance: Assist of 1 with walker     3. Pain status: Pain free.    4. Return to near baseline physical activity: No    Patient alert and oriented to self and place- disoriented to time and situation. Vitals are Temp: 98.3  F (36.8  C) Temp src: Oral BP: 111/75 Pulse: 58   Resp: 18 SpO2: 95 % RA. Denies pain. Tolerating regular diet. IV infusing. Continuing with supportive cares and symptom management.     Discharge Planner Nurse   Safe discharge environment identified: Yes  Barriers to discharge: Yes       Entered by: Starla Kelly 09/20/2021 1:24 PM     Please review provider order for any additional goals.   Nurse to notify provider when observation goals have been met and patient is ready for discharge.

## 2021-09-20 NOTE — CONSULTS
Care Management Discharge Note    Discharge Date: 09/20/2021     Discharge Disposition:  Return to [facility]    Discharge Transportation:  Family? Awaiting call back from ej Woodson    Private pay costs discussed: Not applicable    Education Provided on the Discharge Plan:  Yes  Persons Notified of Discharge Plans: Candy ESTEBAN [phone], left VM for ej Woodson  Patient/Family in Agreement with the Plan:  Yes    Handoff Referral Completed: No    Additional Information:  Patient admitted under observation status for hypotension. [facility] staff accidentally administered spouses meds to patient. Patient has been medically cleared for discharge today.     SW placed call to Candy, [phone], spoke to RN. Pt resides in [facility] and receives assist with meds, transfers to , toileting and meals.    Discharge orders faxed to (f) [phone]. Awaiting call back from dtr to discuss transport. Facility requests pt return by 1600.    1200: Received call back from ej Woodson. Dtr will transport at 1400 today. Candy ESTEBAN updated on pt's return time-VM left.     PAVEL Mccain

## 2021-09-20 NOTE — PLAN OF CARE
PRIMARY DIAGNOSIS: ACCIDENTAL DRUG OVERDOSE   OUTPATIENT/OBSERVATION GOALS TO BE MET BEFORE DISCHARGE:  1. ADLs back to baseline: No    2. Activity and level of assistance: Assist of 1 with walker     3. Pain status: Pain free.    4. Return to near baseline physical activity: No    Patient alert and oriented to self and place- disoriented to time and situation. Vitals are Temp: 98.3  F (36.8  C) Temp src: Oral BP: 111/75 Pulse: 58   Resp: 18 SpO2: 95 % RA. Denies pain. Tolerating regular diet. IV infusing. Plan to discharge back to prior living arragement today. Daughter to transport at 1400. Continuing with supportive cares and symptom management.     Discharge Planner Nurse   Safe discharge environment identified: Yes  Barriers to discharge: Yes       Entered by: Starla Kelly 09/20/2021 1:25 PM     Please review provider order for any additional goals.   Nurse to notify provider when observation goals have been met and patient is ready for discharge.

## 2021-09-20 NOTE — PHARMACY-ADMISSION MEDICATION HISTORY
Admission medication history interview status for this patient is complete. See [facility] admission navigator for allergy information, prior to admission medications and immunization status.     See note from Amirah Torrez [facility] - with note type downtime event note     Prior to Admission medications    Medication Sig Last Dose Taking? Auth Provider   ACETAMINOPHEN EXTRA STRENGTH 500 MG tablet TAKE TWO TABLETS (1000MG) BY MOUTH TWICE DAILY;AND MAY TAKE TWO TABLETS (1000MG) BY MOUTH ONCE DAILY AS NEEDED FOR PAIN 9/19/2021 at 0822 Yes Dolores Babb APRN CNP   albuterol (PROAIR HFA/PROVENTIL HFA/VENTOLIN HFA) 108 (90 Base) MCG/ACT inhaler Inhale 2 puffs into the lungs every 4 hours as needed for shortness of breath / dyspnea or wheezing  at PRN Yes Dolores Babb APRN CNP   atorvastatin (LIPITOR) 20 MG tablet TAKE 1 TABLET BY MOUTH ONCE DAILY 9/18/2021 at 2000 Yes Dolores Babb APRN CNP   citalopram (CELEXA) 10 MG tablet TAKE 1 TABLET BY MOUTH ONCE DAILY 9/18/2021 at 2000 Yes Dolores Babb APRN CNP   diclofenac (VOLTAREN) 1 % topical gel Apply 2 g topically 2 times daily. May also apply 2 g 2 times daily as needed for moderate pain. 9/19/2021 at 0822 Yes Dolores Babb APRN CNP   donepezil (ARICEPT) 5 MG tablet TAKE 1 TABLET BY MOUTH ONCE DAILY 9/18/2021 at 2000 Yes Dolores Babb APRN CNP   potassium chloride ER (K-TAB) 20 MEQ CR tablet Take 1 tablet (20 mEq) by mouth daily 9/19/2021 at 0822 Yes Dolores Babb APRN CNP   valsartan-hydrochlorothiazide (DIOVAN HCT) 320-25 MG tablet TAKE 1 TABLET BY MOUTH ONCE DAILY 9/19/2021 at 0822 Yes Dolores Babb APRN CNP   vitamin D3 (CHOLECALCIFEROL) 50 mcg (2000 units) tablet Take 1 tablet (50 mcg) by mouth daily 9/19/2021 at 0822 Yes Dolores Babb APRN CNP

## 2021-09-21 ENCOUNTER — ASSISTED LIVING VISIT (OUTPATIENT)
Dept: GERIATRICS | Facility: CLINIC | Age: 86
End: 2021-09-21
Payer: MEDICARE

## 2021-09-21 ENCOUNTER — TRANSFERRED RECORDS (OUTPATIENT)
Dept: HEALTH INFORMATION MANAGEMENT | Facility: CLINIC | Age: 86
End: 2021-09-21

## 2021-09-21 VITALS
DIASTOLIC BLOOD PRESSURE: 69 MMHG | HEIGHT: 63 IN | HEART RATE: 77 BPM | SYSTOLIC BLOOD PRESSURE: 134 MMHG | WEIGHT: 129.8 LBS | BODY MASS INDEX: 23 KG/M2

## 2021-09-21 DIAGNOSIS — I10 ESSENTIAL HYPERTENSION: ICD-10-CM

## 2021-09-21 DIAGNOSIS — I95.2 HYPOTENSION DUE TO DRUGS: Primary | ICD-10-CM

## 2021-09-21 DIAGNOSIS — R19.7 DIARRHEA, UNSPECIFIED TYPE: ICD-10-CM

## 2021-09-21 LAB — POTASSIUM BLD-SCNC: 3.5 MMOL/L (ref 3.4–5.3)

## 2021-09-21 PROCEDURE — P9604 ONE-WAY ALLOW PRORATED TRIP: HCPCS | Mod: ORL | Performed by: NURSE PRACTITIONER

## 2021-09-21 PROCEDURE — 84132 ASSAY OF SERUM POTASSIUM: CPT | Mod: ORL | Performed by: NURSE PRACTITIONER

## 2021-09-21 PROCEDURE — 36415 COLL VENOUS BLD VENIPUNCTURE: CPT | Mod: ORL | Performed by: NURSE PRACTITIONER

## 2021-09-21 RX ORDER — VALSARTAN AND HYDROCHLOROTHIAZIDE 160; 12.5 MG/1; MG/1
1 TABLET, FILM COATED ORAL DAILY
Qty: 30 TABLET | Refills: 3 | Status: SHIPPED | OUTPATIENT
Start: 2021-09-21 | End: 2021-12-09 | Stop reason: ALTCHOICE

## 2021-09-21 ASSESSMENT — MIFFLIN-ST. JEOR: SCORE: 1016.86

## 2021-09-21 NOTE — PROGRESS NOTES
[facility] SERVICES  PRIMARY CARE PROVIDER AND CLINIC:  Dolores Babb, NADINE CNP, [address] / [place] [address]  Chief Complaint   Patient presents with     Hospital F/U      [place] Medical Record Number:  [id number]  Place of Service where encounter took place:  [facility] [[id number]]    Edmundo James  is a 85 year old  (1935), returned to the above facility from  [facility]. Hospital stay 9/19/21 - 9/20/21 - 28 hr ED stay. .   HPI:        Patient is an 85 year old female recently admitted to [facility] due to increased care needs.  PHI significant for HTN, HLD, OA, asthma, and dementia without official work-up.      She is seen today for hospital follow-up.  She was sent to the ER after accidental ingestion of her 's medications, (see epic H&P 9/19 for full list of meds).  Patient become hypotensive and unresponsive.  She was given IVF, calcium gluconate, and monitored closely at the hospital.  Also continued to report diarrhea that had been ongoing since her diagnosis of COVID.  C-diff was negative inpatient    She is seen today in her apartment and reports feeling much better.  She denies dizziness, lightheadedness, CP, shortness of breath, nausea, fatigue and weakness.  She tells me her diarrhea is getting better.  She is due for K+ recheck today.      CODE STATUS/ADVANCE DIRECTIVES DISCUSSION:  Prior  DNR / DNI  ALLERGIES:   Allergies   Allergen Reactions     Cats Other (See Comments)     Runny nose, watery eyes      Dogs Other (See Comments)     Runny nose and watery eyes      Mold Other (See Comments)     Runny nose, watery eyes       PAST MEDICAL HISTORY:   Past Medical History:   Diagnosis Date     Heart disease      HTN (hypertension)      Hyperlipidemia      OA (osteoarthritis) of knee      Uncomplicated asthma       PAST SURGICAL HISTORY:   has a past surgical history that includes Cholecystectomy;  "Lumpectomy breast; and tonsillectomy.  FAMILY HISTORY: family history includes Heart Disease in her father; Hypertension in her father and mother.  SOCIAL HISTORY:   reports that she has never smoked. She has never used smokeless tobacco. She reports current alcohol use. She reports that she does not use drugs.  Patient's living condition: lives in a skilled nursing facility    Post Discharge Medication Reconciliation Status: discharge medications reconciled and changed, per note/orders  Current Outpatient Medications   Medication Sig     ACETAMINOPHEN EXTRA STRENGTH 500 MG tablet TAKE TWO TABLETS (1000MG) BY MOUTH TWICE DAILY;AND MAY TAKE TWO TABLETS (1000MG) BY MOUTH ONCE DAILY AS NEEDED FOR PAIN     albuterol (PROAIR HFA/PROVENTIL HFA/VENTOLIN HFA) 108 (90 Base) MCG/ACT inhaler Inhale 2 puffs into the lungs every 4 hours as needed for shortness of breath / dyspnea or wheezing     atorvastatin (LIPITOR) 20 MG tablet TAKE 1 TABLET BY MOUTH ONCE DAILY     citalopram (CELEXA) 10 MG tablet TAKE 1 TABLET BY MOUTH ONCE DAILY     diclofenac (VOLTAREN) 1 % topical gel Apply 2 g topically 2 times daily. May also apply 2 g 2 times daily as needed for moderate pain.     donepezil (ARICEPT) 5 MG tablet TAKE 1 TABLET BY MOUTH ONCE DAILY     potassium chloride ER (K-TAB) 20 MEQ CR tablet Take 1 tablet (20 mEq) by mouth daily     valsartan-hydrochlorothiazide (DIOVAN HCT) 320-25 MG tablet Take 1 tablet by mouth daily Hold for SBP <125     vitamin D3 (CHOLECALCIFEROL) 50 mcg (2000 units) tablet Take 1 tablet (50 mcg) by mouth daily     No current facility-administered medications for this visit.       ROS:  10 point ROS of systems including Constitutional, Eyes, Respiratory, Cardiovascular, Gastroenterology, Genitourinary, Integumentary, Musculoskeletal, Psychiatric were all negative except for pertinent positives noted in my HPI.    Vitals:  /69   Pulse 77   Ht 1.607 m (5' 3.25\")   Wt 58.9 kg (129 lb 12.8 oz)   BMI " 22.81 kg/m    Exam:  GENERAL APPEARANCE:  Alert, in no distress, cooperative  EYES:  EOM, conjunctivae, lids, pupils and irises normal, PERRL  NECK:  No adenopathy,masses or thyromegaly  RESP:  respiratory effort and palpation of chest normal, lungs clear to auscultation , no respiratory distress  CV:  Palpation and auscultation of heart done , regular rate and rhythm, no murmur, rub, or gallop, no edema  ABDOMEN:  no guarding or rebound, bowel sounds normal, no organomegaly  M/S:   Gait and station abnormal ambulates with 4ww, drags her left heel on ground, unsteady, no edema/erythema joints  SKIN:  no obvious rashes or wounds  NEURO:   speech clear, no tremor, normal strenght and tone, PERRL, poor recall of events   PSYCH:  oriented X 3, insight and judgement impaired, memory impaired , affect and mood flat        Lab/Diagnostic data:  Recent Results (from the past 240 hour(s))   Potassium    Collection Time: 09/14/21  8:21 AM   Result Value Ref Range    Potassium 3.8 3.4 - 5.3 mmol/L   Sodium    Collection Time: 09/14/21  8:21 AM   Result Value Ref Range    Sodium 133 133 - 144 mmol/L   Glucose by meter    Collection Time: 09/19/21  9:44 AM   Result Value Ref Range    GLUCOSE BY METER POCT 144 (H) 70 - 99 mg/dL   Extra Blood Culture Bottle    Collection Time: 09/19/21  9:48 AM   Result Value Ref Range    Hold Specimen [facility]    Extra Blue Top Tube    Collection Time: 09/19/21  9:48 AM   Result Value Ref Range    Hold Specimen [facility]    Extra Red Top Tube    Collection Time: 09/19/21  9:48 AM   Result Value Ref Range    Hold Specimen [facility]    Extra Green Top (Lithium Heparin) Tube    Collection Time: 09/19/21  9:48 AM   Result Value Ref Range    Hold Specimen [facility]    Extra Purple Top Tube    Collection Time: 09/19/21  9:48 AM   Result Value Ref Range    Hold Specimen [facility]    Extra Blood Bank Purple Top Tube    Collection Time: 09/19/21  9:48 AM   Result Value Ref Range    Hold Specimen [facility]    Extra Heparinized Syringe     Collection Time: 09/19/21  9:48 AM   Result Value Ref Range    Hold Specimen [facility]    Extra Green Top (Lithium Heparin) ON ICE    Collection Time: 09/19/21  9:48 AM   Result Value Ref Range    Hold Specimen [facility]    Comprehensive metabolic panel    Collection Time: 09/19/21  9:48 AM   Result Value Ref Range    Sodium 136 133 - 144 mmol/L    Potassium 3.7 3.4 - 5.3 mmol/L    Chloride 102 94 - 109 mmol/L    Carbon Dioxide (CO2) 25 20 - 32 mmol/L    Anion Gap 9 3 - 14 mmol/L    Urea Nitrogen 20 7 - 30 mg/dL    Creatinine 0.68 0.52 - 1.04 mg/dL    Calcium 8.2 (L) 8.5 - 10.1 mg/dL    Glucose 144 (H) 70 - 99 mg/dL    Alkaline Phosphatase 52 40 - 150 U/L    AST 25 0 - 45 U/L    ALT 33 0 - 50 U/L    Protein Total 6.0 (L) 6.8 - 8.8 g/dL    Albumin 2.9 (L) 3.4 - 5.0 g/dL    Bilirubin Total 0.5 0.2 - 1.3 mg/dL    GFR Estimate 81 >60 mL/min/1.73m2   Lactic acid whole blood    Collection Time: 09/19/21  9:48 AM   Result Value Ref Range    Lactic Acid 1.4 0.7 - 2.0 mmol/L   Lipase    Collection Time: 09/19/21  9:48 AM   Result Value Ref Range    Lipase 221 73 - 393 U/L   Troponin I    Collection Time: 09/19/21  9:48 AM   Result Value Ref Range    Troponin I <0.015 0.000 - 0.045 ug/L   CBC with platelets and differential    Collection Time: 09/19/21  9:48 AM   Result Value Ref Range    WBC Count 2.3 (L) 4.0 - 11.0 10e3/uL    RBC Count 4.60 3.80 - 5.20 10e6/uL    Hemoglobin 12.5 11.7 - 15.7 g/dL    Hematocrit 38.1 35.0 - 47.0 %    MCV 83 78 - 100 fL    MCH 27.2 26.5 - 33.0 pg    MCHC 32.8 31.5 - 36.5 g/dL    RDW 14.5 10.0 - 15.0 %    Platelet Count 224 150 - 450 10e3/uL    % Neutrophils 52 %    % Lymphocytes 31 %    % Monocytes 12 %    % Eosinophils 4 %    % Basophils 1 %    % Immature Granulocytes 0 %    NRBCs per 100 WBC 0 <1 /100    Absolute Neutrophils 1.2 (L) 1.6 - 8.3 10e3/uL    Absolute Lymphocytes 0.7 (L) 0.8 - 5.3 10e3/uL    Absolute Monocytes 0.3 0.0 - 1.3 10e3/uL    Absolute Eosinophils 0.1 0.0 - 0.7 10e3/uL    Absolute  Basophils 0.0 0.0 - 0.2 10e3/uL    Absolute Immature Granulocytes 0.0 <=0.0 10e3/uL    Absolute NRBCs 0.0 10e3/uL   Blood Culture Arm, Left    Collection Time: 09/19/21  9:48 AM    Specimen: Arm, Left; Blood   Result Value Ref Range    Culture No growth after 1 day    EKG 12-lead, tracing only    Collection Time: 09/19/21  9:49 AM   Result Value Ref Range    Systolic Blood Pressure  mmHg    Diastolic Blood Pressure  mmHg    Ventricular Rate 73 BPM    Atrial Rate 73 BPM    SC Interval 186 ms    QRS Duration 92 ms     ms    QTc 504 ms    P Axis 41 degrees    R AXIS -37 degrees    T Axis 21 degrees    Interpretation ECG       Sinus rhythm  Left axis deviation  Prolonged QT  Abnormal ECG  No previous ECGs available     Blood Culture Peripheral Blood    Collection Time: 09/19/21  9:55 AM    Specimen: Peripheral Blood   Result Value Ref Range    Culture No growth after 2 days    Clostridium difficile toxin B PCR    Collection Time: 09/19/21  9:59 AM    Specimen: Per Rectum; Stool   Result Value Ref Range    C Difficile Toxin B by PCR Negative Negative   Enteric Bacteria and Virus Panel by JONY Stool    Collection Time: 09/19/21 10:06 AM    Specimen: Per Rectum; Stool   Result Value Ref Range    Campylobacter group Not Detected Not Detected    Salmonella species Not Detected Not Detected    Shigella species Not Detected Not Detected    Vibrio group Not Detected Not Detected    Rotavirus Not Detected Not Detected    Shiga toxin 1 gene Not Detected Not Detected    Shiga toxin 2 gene Not Detected Not Detected    Norovirus I and II Not Detected Not Detected    Yersinia enterocolitica Not Detected Not Detected   UA with Microscopic reflex to Culture    Collection Time: 09/19/21 10:07 AM    Specimen: Urine, Catheter   Result Value Ref Range    Color Urine Light Yellow Colorless, Straw, Light Yellow, Yellow    Appearance Urine Clear Clear    Glucose Urine Negative Negative mg/dL    Bilirubin Urine Negative Negative     Ketones Urine Negative Negative mg/dL    Specific Gravity Urine 1.016 1.003 - 1.035    Blood Urine Negative Negative    pH Urine 5.5 5.0 - 7.0    Protein Albumin Urine 10  (A) Negative mg/dL    Urobilinogen Urine Normal Normal, 2.0 mg/dL    Nitrite Urine Negative Negative    Leukocyte Esterase Urine Negative Negative    Mucus Urine Present (A) None Seen /LPF    RBC Urine <1 <=2 /HPF    WBC Urine <1 <=5 /HPF    Squamous Epithelials Urine 1 <=1 /HPF    Hyaline Casts Urine 10 (H) <=2 /LPF   Ionized Calcium    Collection Time: 09/19/21  1:18 PM   Result Value Ref Range    Calcium Ionized 4.9 4.4 - 5.2 mg/dL   Basic metabolic panel    Collection Time: 09/20/21  6:37 AM   Result Value Ref Range    Sodium 137 133 - 144 mmol/L    Potassium 3.0 (L) 3.4 - 5.3 mmol/L    Chloride 106 94 - 109 mmol/L    Carbon Dioxide (CO2) 23 20 - 32 mmol/L    Anion Gap 8 3 - 14 mmol/L    Urea Nitrogen 17 7 - 30 mg/dL    Creatinine 0.64 0.52 - 1.04 mg/dL    Calcium 8.4 (L) 8.5 - 10.1 mg/dL    Glucose 90 70 - 99 mg/dL    GFR Estimate 83 >60 mL/min/1.73m2   Potassium    Collection Time: 09/20/21  8:50 AM   Result Value Ref Range    Potassium 3.1 (L) 3.4 - 5.3 mmol/L   Potassium    Collection Time: 09/20/21 12:08 PM   Result Value Ref Range    Potassium 3.6 3.4 - 5.3 mmol/L   Potassium    Collection Time: 09/21/21  7:29 AM   Result Value Ref Range    Potassium 3.5 3.4 - 5.3 mmol/L       ASSESSMENT/PLAN:    (I95.2) Hypotension due to drugs  (primary encounter diagnosis)  (I10) Essential hypertension  Comment: severe hypotension due to accidental ingestion of multiple medications erroneously given to her, many of which were BP meds  -BP improved inpatient with IVF and calcium gluconate  -review of BP since her return 134/69, 99/61, 120/83, 110/62, 94/57  -BP still soft for patient based on age and fall risk   Plan: reduce valsartan-hydrochlorothiazide to 160-12.5mg po every day  -staff obtain BP and HR daily and update me next week  -K+ ok today at  3.5  -BMP recheck next week     (R19.7) Diarrhea, unspecified type  Comment: since dx of COVID  -c-diff negative inpatient  -patient reports is getting better each day  -decline prn imodium   Plan: staff update with concerns     Electronically signed NADINE Higginbotham CNP

## 2021-09-21 NOTE — LETTER
9/21/2021        RE: Edmundo James  [address] [place] [address]        [facility] GERIATR[facility]  PRIMARY CARE PROVIDER AND CLINIC:  Dolores Babb, NADINE CNP, [address] / [place] [address]  Chief Complaint   Patient presents with     Hospital F/U      [facility] Record Number:  [id number]  Place of Service where encounter took place:  [facility] ([facility]) [[id number]]    Edmundo James  is a 85 year old  (1935), returned to the above facility from  [facility]. Hospital stay 9/19/21 - 9/20/21 - 28 hr ED stay. .   HPI:        Patient is an 85 year old female recently admitted to [facility] due to increased care needs.  PHI significant for HTN, HLD, OA, asthma, and dementia without official work-up.      She is seen today for hospital follow-up.  She was sent to the ER after accidental ingestion of her 's medications, (see epic H&P 9/19 for full list of meds).  Patient become hypotensive and unresponsive.  She was given IVF, calcium gluconate, and monitored closely at the hospital.  Also continued to report diarrhea that had been ongoing since her diagnosis of COVID.  C-diff was negative inpatient    She is seen today in her apartment and reports feeling much better.  She denies dizziness, lightheadedness, CP, shortness of breath, nausea, fatigue and weakness.  She tells me her diarrhea is getting better.  She is due for K+ recheck today.      CODE STATUS/ADVANCE DIRECTIVES DISCUSSION:  Prior  DNR / DNI  ALLERGIES:   Allergies   Allergen Reactions     Cats Other (See Comments)     Runny nose, watery eyes      Dogs Other (See Comments)     Runny nose and watery eyes      Mold Other (See Comments)     Runny nose, watery eyes       PAST MEDICAL HISTORY:   Past Medical History:   Diagnosis Date     Heart disease      HTN (hypertension)      Hyperlipidemia      OA (osteoarthritis) of knee      Uncomplicated asthma        PAST SURGICAL HISTORY:   has a past surgical history that includes Cholecystectomy; Lumpectomy breast; and tonsillectomy.  FAMILY HISTORY: family history includes Heart Disease in her father; Hypertension in her father and mother.  SOCIAL HISTORY:   reports that she has never smoked. She has never used smokeless tobacco. She reports current alcohol use. She reports that she does not use drugs.  Patient's living condition: lives in a skilled nursing facility    Post Discharge Medication Reconciliation Status: discharge medications reconciled and changed, per note/orders  Current Outpatient Medications   Medication Sig     ACETAMINOPHEN EXTRA STRENGTH 500 MG tablet TAKE TWO TABLETS (1000MG) BY MOUTH TWICE DAILY;AND MAY TAKE TWO TABLETS (1000MG) BY MOUTH ONCE DAILY AS NEEDED FOR PAIN     albuterol (PROAIR HFA/PROVENTIL HFA/VENTOLIN HFA) 108 (90 Base) MCG/ACT inhaler Inhale 2 puffs into the lungs every 4 hours as needed for shortness of breath / dyspnea or wheezing     atorvastatin (LIPITOR) 20 MG tablet TAKE 1 TABLET BY MOUTH ONCE DAILY     citalopram (CELEXA) 10 MG tablet TAKE 1 TABLET BY MOUTH ONCE DAILY     diclofenac (VOLTAREN) 1 % topical gel Apply 2 g topically 2 times daily. May also apply 2 g 2 times daily as needed for moderate pain.     donepezil (ARICEPT) 5 MG tablet TAKE 1 TABLET BY MOUTH ONCE DAILY     potassium chloride ER (K-TAB) 20 MEQ CR tablet Take 1 tablet (20 mEq) by mouth daily     valsartan-hydrochlorothiazide (DIOVAN HCT) 320-25 MG tablet Take 1 tablet by mouth daily Hold for SBP <125     vitamin D3 (CHOLECALCIFEROL) 50 mcg (2000 units) tablet Take 1 tablet (50 mcg) by mouth daily     No current facility-administered medications for this visit.       ROS:  10 point ROS of systems including Constitutional, Eyes, Respiratory, Cardiovascular, Gastroenterology, Genitourinary, Integumentary, Musculoskeletal, Psychiatric were all negative except for pertinent positives noted in my HPI.    Vitals:  BP  "134/69   Pulse 77   Ht 1.607 m (5' 3.25\")   Wt 58.9 kg (129 lb 12.8 oz)   BMI 22.81 kg/m    Exam:  GENERAL APPEARANCE:  Alert, in no distress, cooperative  EYES:  EOM, conjunctivae, lids, pupils and irises normal, PERRL  NECK:  No adenopathy,masses or thyromegaly  RESP:  respiratory effort and palpation of chest normal, lungs clear to auscultation , no respiratory distress  CV:  Palpation and auscultation of heart done , regular rate and rhythm, no murmur, rub, or gallop, no edema  ABDOMEN:  no guarding or rebound, bowel sounds normal, no organomegaly  M/S:   Gait and station abnormal ambulates with 4ww, drags her left heel on ground, unsteady, no edema/erythema joints  SKIN:  no obvious rashes or wounds  NEURO:   speech clear, no tremor, normal strenght and tone, PERRL, poor recall of events   PSYCH:  oriented X 3, insight and judgement impaired, memory impaired , affect and mood flat        Lab/Diagnostic data:  Recent Results (from the past 240 hour(s))   Potassium    Collection Time: 09/14/21  8:21 AM   Result Value Ref Range    Potassium 3.8 3.4 - 5.3 mmol/L   Sodium    Collection Time: 09/14/21  8:21 AM   Result Value Ref Range    Sodium 133 133 - 144 mmol/L   Glucose by meter    Collection Time: 09/19/21  9:44 AM   Result Value Ref Range    GLUCOSE BY METER POCT 144 (H) 70 - 99 mg/dL   Extra Blood Culture Bottle    Collection Time: 09/19/21  9:48 AM   Result Value Ref Range    Hold Specimen [facility]    Extra Blue Top Tube    Collection Time: 09/19/21  9:48 AM   Result Value Ref Range    Hold Specimen [facility]    Extra Red Top Tube    Collection Time: 09/19/21  9:48 AM   Result Value Ref Range    Hold Specimen [facility]    Extra Green Top (Lithium Heparin) Tube    Collection Time: 09/19/21  9:48 AM   Result Value Ref Range    Hold Specimen [facility]    Extra Purple Top Tube    Collection Time: 09/19/21  9:48 AM   Result Value Ref Range    Hold Specimen [facility]    Extra Blood Bank Purple Top Tube    Collection Time: 09/19/21  9:48 " AM   Result Value Ref Range    Hold Specimen [facility]    Extra Heparinized Syringe    Collection Time: 09/19/21  9:48 AM   Result Value Ref Range    Hold Specimen [facility]    Extra Green Top (Lithium Heparin) ON ICE    Collection Time: 09/19/21  9:48 AM   Result Value Ref Range    Hold Specimen [facility]    Comprehensive metabolic panel    Collection Time: 09/19/21  9:48 AM   Result Value Ref Range    Sodium 136 133 - 144 mmol/L    Potassium 3.7 3.4 - 5.3 mmol/L    Chloride 102 94 - 109 mmol/L    Carbon Dioxide (CO2) 25 20 - 32 mmol/L    Anion Gap 9 3 - 14 mmol/L    Urea Nitrogen 20 7 - 30 mg/dL    Creatinine 0.68 0.52 - 1.04 mg/dL    Calcium 8.2 (L) 8.5 - 10.1 mg/dL    Glucose 144 (H) 70 - 99 mg/dL    Alkaline Phosphatase 52 40 - 150 U/L    AST 25 0 - 45 U/L    ALT 33 0 - 50 U/L    Protein Total 6.0 (L) 6.8 - 8.8 g/dL    Albumin 2.9 (L) 3.4 - 5.0 g/dL    Bilirubin Total 0.5 0.2 - 1.3 mg/dL    GFR Estimate 81 >60 mL/min/1.73m2   Lactic acid whole blood    Collection Time: 09/19/21  9:48 AM   Result Value Ref Range    Lactic Acid 1.4 0.7 - 2.0 mmol/L   Lipase    Collection Time: 09/19/21  9:48 AM   Result Value Ref Range    Lipase 221 73 - 393 U/L   Troponin I    Collection Time: 09/19/21  9:48 AM   Result Value Ref Range    Troponin I <0.015 0.000 - 0.045 ug/L   CBC with platelets and differential    Collection Time: 09/19/21  9:48 AM   Result Value Ref Range    WBC Count 2.3 (L) 4.0 - 11.0 10e3/uL    RBC Count 4.60 3.80 - 5.20 10e6/uL    Hemoglobin 12.5 11.7 - 15.7 g/dL    Hematocrit 38.1 35.0 - 47.0 %    MCV 83 78 - 100 fL    MCH 27.2 26.5 - 33.0 pg    MCHC 32.8 31.5 - 36.5 g/dL    RDW 14.5 10.0 - 15.0 %    Platelet Count 224 150 - 450 10e3/uL    % Neutrophils 52 %    % Lymphocytes 31 %    % Monocytes 12 %    % Eosinophils 4 %    % Basophils 1 %    % Immature Granulocytes 0 %    NRBCs per 100 WBC 0 <1 /100    Absolute Neutrophils 1.2 (L) 1.6 - 8.3 10e3/uL    Absolute Lymphocytes 0.7 (L) 0.8 - 5.3 10e3/uL    Absolute Monocytes 0.3  0.0 - 1.3 10e3/uL    Absolute Eosinophils 0.1 0.0 - 0.7 10e3/uL    Absolute Basophils 0.0 0.0 - 0.2 10e3/uL    Absolute Immature Granulocytes 0.0 <=0.0 10e3/uL    Absolute NRBCs 0.0 10e3/uL   Blood Culture Arm, Left    Collection Time: 09/19/21  9:48 AM    Specimen: Arm, Left; Blood   Result Value Ref Range    Culture No growth after 1 day    EKG 12-lead, tracing only    Collection Time: 09/19/21  9:49 AM   Result Value Ref Range    Systolic Blood Pressure  mmHg    Diastolic Blood Pressure  mmHg    Ventricular Rate 73 BPM    Atrial Rate 73 BPM    KY Interval 186 ms    QRS Duration 92 ms     ms    QTc 504 ms    P Axis 41 degrees    R AXIS -37 degrees    T Axis 21 degrees    Interpretation ECG       Sinus rhythm  Left axis deviation  Prolonged QT  Abnormal ECG  No previous ECGs available     Blood Culture Peripheral Blood    Collection Time: 09/19/21  9:55 AM    Specimen: Peripheral Blood   Result Value Ref Range    Culture No growth after 2 days    Clostridium difficile toxin B PCR    Collection Time: 09/19/21  9:59 AM    Specimen: Per Rectum; Stool   Result Value Ref Range    C Difficile Toxin B by PCR Negative Negative   Enteric Bacteria and Virus Panel by JONY Stool    Collection Time: 09/19/21 10:06 AM    Specimen: Per Rectum; Stool   Result Value Ref Range    Campylobacter group Not Detected Not Detected    Salmonella species Not Detected Not Detected    Shigella species Not Detected Not Detected    Vibrio group Not Detected Not Detected    Rotavirus Not Detected Not Detected    Shiga toxin 1 gene Not Detected Not Detected    Shiga toxin 2 gene Not Detected Not Detected    Norovirus I and II Not Detected Not Detected    Yersinia enterocolitica Not Detected Not Detected   UA with Microscopic reflex to Culture    Collection Time: 09/19/21 10:07 AM    Specimen: Urine, Catheter   Result Value Ref Range    Color Urine Light Yellow Colorless, Straw, Light Yellow, Yellow    Appearance Urine Clear Clear    Glucose  Urine Negative Negative mg/dL    Bilirubin Urine Negative Negative    Ketones Urine Negative Negative mg/dL    Specific Gravity Urine 1.016 1.003 - 1.035    Blood Urine Negative Negative    pH Urine 5.5 5.0 - 7.0    Protein Albumin Urine 10  (A) Negative mg/dL    Urobilinogen Urine Normal Normal, 2.0 mg/dL    Nitrite Urine Negative Negative    Leukocyte Esterase Urine Negative Negative    Mucus Urine Present (A) None Seen /LPF    RBC Urine <1 <=2 /HPF    WBC Urine <1 <=5 /HPF    Squamous Epithelials Urine 1 <=1 /HPF    Hyaline Casts Urine 10 (H) <=2 /LPF   Ionized Calcium    Collection Time: 09/19/21  1:18 PM   Result Value Ref Range    Calcium Ionized 4.9 4.4 - 5.2 mg/dL   Basic metabolic panel    Collection Time: 09/20/21  6:37 AM   Result Value Ref Range    Sodium 137 133 - 144 mmol/L    Potassium 3.0 (L) 3.4 - 5.3 mmol/L    Chloride 106 94 - 109 mmol/L    Carbon Dioxide (CO2) 23 20 - 32 mmol/L    Anion Gap 8 3 - 14 mmol/L    Urea Nitrogen 17 7 - 30 mg/dL    Creatinine 0.64 0.52 - 1.04 mg/dL    Calcium 8.4 (L) 8.5 - 10.1 mg/dL    Glucose 90 70 - 99 mg/dL    GFR Estimate 83 >60 mL/min/1.73m2   Potassium    Collection Time: 09/20/21  8:50 AM   Result Value Ref Range    Potassium 3.1 (L) 3.4 - 5.3 mmol/L   Potassium    Collection Time: 09/20/21 12:08 PM   Result Value Ref Range    Potassium 3.6 3.4 - 5.3 mmol/L   Potassium    Collection Time: 09/21/21  7:29 AM   Result Value Ref Range    Potassium 3.5 3.4 - 5.3 mmol/L       ASSESSMENT/PLAN:    (I95.2) Hypotension due to drugs  (primary encounter diagnosis)  (I10) Essential hypertension  Comment: severe hypotension due to accidental ingestion of multiple medications erroneously given to her, many of which were BP meds  -BP improved inpatient with IVF and calcium gluconate  -review of BP since her return 134/69, 99/61, 120/83, 110/62, 94/57  -BP still soft for patient based on age and fall risk   Plan: reduce valsartan-hydrochlorothiazide to 160-12.5mg po every  day  -staff obtain BP and HR daily and update me next week  -K+ ok today at 3.5  -BMP recheck next week     (R19.7) Diarrhea, unspecified type  Comment: since dx of COVID  -c-diff negative inpatient  -patient reports is getting better each day  -decline prn imodium   Plan: staff update with concerns     Electronically signed bt  NADINE Rogers CNP                       Sincerely,        NADINE Rogers CNP

## 2021-09-22 ENCOUNTER — TELEPHONE (OUTPATIENT)
Dept: GERIATRICS | Facility: CLINIC | Age: 86
End: 2021-09-22

## 2021-09-22 NOTE — TELEPHONE ENCOUNTER
[facility] Nurse Triage Telephone Note    Provider: NADINE Kirk CNP  Facility: [facility]   Facility Type:  [facility]    Caller: [facility]  Call Back Number: [phone]    Allergies   Allergen Reactions     Cats Other (See Comments)     Runny nose, watery eyes      Dogs Other (See Comments)     Runny nose and watery eyes      Mold Other (See Comments)     Runny nose, watery eyes        Reason for call: Requesting order for OT eval to be done this week d/t it being missed a couple weeks ago.    Verbal Order/Direction given by Provider: OK for OT eval/treat    Provider giving Order:  NADINE Kikr CNP    Verbal Order given to: Maryana Hunter RN

## 2021-09-22 NOTE — DISCHARGE SUMMARY
[facility]    Discharge Summary  Hospitalist    Date of Admission:  9/19/2021  Date of Discharge:  9/20/2021  2:34 PM  Discharging Provider: Chika Peterson PA-C  Date of Service (when I saw the patient): 9/20/2021    Discharge Diagnoses   Accidental ingestion of multiple blood pressure medications   Diarrhea     History of Present Illness   Edmundo James is a 85 year old female with a PMH significant for alzheimer's dementia, HTN, HLD, OA, asthma, and depression, who presents after accidental ingestion of multiple medications (see below). The patient lives at [facility] with her  and apparently she was erroneously given her 's morning medications (listed below) at 820am today. She then became unresponsive so EMS was called. Her BP was initially 52/27 and glc was within normal limits. She was given IVF and brought to the ER for care.        Initial work up in the ED reveals: BP 64//59, Pulse ranged from 35-78 and Resp 9-30. She was given 3L NS IVF boluses. Her BP and pulse were quite low on initial presentation, but slowly improved with IVF bolus on NS running at 200cc/hour. Her pulse would miriam down to the 30s when she would retch (she was having nausea) but bounce back quickly. Poison Control was contacted and recommended supportive cares, the peak effect of her meds were thought to be at 4 hours (so around 1230p). She was given some calcium gluconate for a slightly low CA level as well.  Pertinent lab results include: CBC: WBC: 2.3 (L) , HGB: 12.5, PLT: 224   CMP: Glucose 144 (H) , Calcium: 8.2 (L) , Albumin: 2.9 (L) , Protein Total: 6 (L) , o/w WNL (Creatinine: 0.68). UA: Protein Albumin: 10 (A), Mucous: Present, Hyaline Casts: 10 (H), o/w Negative. Troponin (Collected 0948): <0.015. Lactic acid (Resulted 0959): 1.4. Lipase: 221. Glucose by meter (0944): 144 (H). Blood Cultures x2: Pending. Enteric Bacteria and Virus Panel by JONY Stool: Pending. C.  "Diff PCR: Pending.      Please see admitting H & P  by Yesenia Monsalve PA-C on 9/19/2021 for full details of the encounter.     Hospital Course   Edmundo James was admitted on 9/19/2021.  The following problems were addressed during her hospitalization:      1. Accidental ingestion of multiple blood pressure medications - patient was given multiple BP medications on accident at her [facility] this morning. She has stabilized in the ER after 3.5L boluses and NS running at 200cc/hour.     Bumex 1 mg  Carvedilol 25 mg  Clonidine 0.1 mg  Hydralazine 100 mg  Metolazone 2.5 mg  Flomax 0.2 mg  Doxycycline 100 mg  Tylenol 500 mg  Protonix     Per poison control, the meds peak at 4 hours (which was around 1230p). Continued improvement with stable hemodynamics.     2. Diarrhea - Resolved. started when she was diagnosed with COVID-19 on 9/3, this was her only symptom. Still having loose stool but is improving. Was negative for c diff at [facility], stool sent again today in the ER.   - ADAT  - c diff and enteric panels neg     3. Hypokalemia - replaced. Iatrogenic secondary to the above listed medications and GI losses.    Pending Results   These results will be followed up by hospitalist  Unresulted Labs Ordered in the Past 30 Days of this Admission     Date and Time Order Name Status Description    9/19/2021 10:43 AM Blood Culture Arm, Left Preliminary     9/19/2021  9:50 AM Blood Culture Peripheral Blood Preliminary           Code Status   DNR / DNI       Primary Care Physician   Dolores Babb      INTERVAL HISTORY  Doing well. At baseline. No chest pain, dyspnea. Tolerating diet. No stooling this AM. Afebrile. No dizziness, lightheadedness.    /75 (BP Location: Right arm)   Pulse 58   Temp 98.3  F (36.8  C) (Oral)   Resp 18   Ht 1.575 m (5' 2\")   Wt 58.4 kg (128 lb 12.8 oz)   SpO2 95%   BMI 23.56 kg/m      GENERAL: healthy, alert and no distress, non-toxic appearing  EYES: Eyes grossly normal to inspection, " extraocular movements - intact, and PERRL  RESP: Normal - Clear to auscultation without rales, rhonchi, or wheezing. and bilateral air exchange present  CV: RRR normal S1S2 without  murmurs, rubs or gallops. PMI normal  ABDOMEN: soft, nontender, without hepatosplenomegaly or masses  MS: extremities- no gross deformities noted, no edema  SKIN: no suspicious lesions, no rashes  NEURO: strength and tone- normal, sensory exam- grossly normal, mentation- intact, speech- normal, reflexes- symmetric  PSYCH: Alert and oriented times 3. Affect is normal.         Discharge Disposition   Discharged to assisted living  Condition at discharge: Stable    Consultations This Hospital Stay   CARE MANAGEMENT / SOCIAL WORK IP CONSULT    Time Spent on this Encounter   IChika PA-C, personally saw the patient today and spent greater than 30 minutes discharging this patient.    Discharge Orders      Reason for your hospital stay    You were admitted to the hospital for low blood pressure after being given incorrect medications at assisted living.     Follow-up and recommended labs and tests     Follow up with primary care provider, Dolores Babb, within 7 days to evaluate medication change and for hospital follow- up.  The following labs/tests are recommended: Potassium check and blood pressure check.     Activity    Your activity upon discharge: activity as tolerated     When to contact your care team    Call your primary care doctor if you have any of the following: temperature greater than 101 F, worsening shortness of breath, increased swelling, worsening pain, new or unrelenting diarrhea, or any other concerning symptoms. Call 911 or go to the emergency room if you need immediate assistance.     Diet    Follow this diet upon discharge: Orders Placed This Encounter      Regular Diet Adult     Discharge Medications   Discharge Medication List as of 9/20/2021 12:27 PM      CONTINUE these medications which have  CHANGED    Details   valsartan-hydrochlorothiazide (DIOVAN HCT) 320-25 MG tablet Take 1 tablet by mouth daily Hold for SBP <125, Disp-28 tablet, R-PRN, No Print Out         CONTINUE these medications which have NOT CHANGED    Details   ACETAMINOPHEN EXTRA STRENGTH 500 MG tablet TAKE TWO TABLETS (1000MG) BY MOUTH TWICE DAILY;AND MAY TAKE TWO TABLETS (1000MG) BY MOUTH ONCE DAILY AS NEEDED FOR PAIN, Disp-112 tablet, R-PRN, E-Prescribe      albuterol (PROAIR HFA/PROVENTIL HFA/VENTOLIN HFA) 108 (90 Base) MCG/ACT inhaler Inhale 2 puffs into the lungs every 4 hours as needed for shortness of breath / dyspnea or wheezing, Disp-6.7 g, R-3, E-PrescribePharmacy may dispense brand covered by insurance (Proair, or proventil or ventolin or generic albuterol inhaler)      atorvastatin (LIPITOR) 20 MG tablet TAKE 1 TABLET BY MOUTH ONCE DAILY, Disp-28 tablet, R-PRN, E-Prescribe      citalopram (CELEXA) 10 MG tablet TAKE 1 TABLET BY MOUTH ONCE DAILY, Disp-31 tablet, R-PRN, E-Prescribe      diclofenac (VOLTAREN) 1 % topical gel Apply 2 g topically 2 times daily. May also apply 2 g 2 times daily as needed for moderate pain., No Print Out      donepezil (ARICEPT) 5 MG tablet TAKE 1 TABLET BY MOUTH ONCE DAILY, Disp-28 tablet, R-PRN, E-Prescribe      potassium chloride ER (K-TAB) 20 MEQ CR tablet Take 1 tablet (20 mEq) by mouth daily, Disp-30 tablet, R-3, E-Prescribe      vitamin D3 (CHOLECALCIFEROL) 50 mcg (2000 units) tablet Take 1 tablet (50 mcg) by mouth daily, Disp-30 tablet, R-3, E-Prescribe           Allergies   Allergies   Allergen Reactions     Cats Other (See Comments)     Runny nose, watery eyes      Dogs Other (See Comments)     Runny nose and watery eyes      Mold Other (See Comments)     Runny nose, watery eyes      Data   Most Recent 3 CBC's:Recent Labs   Lab Test 09/19/21  0948 08/31/21  0805   WBC 2.3* 2.8*   HGB 12.5 13.0   MCV 83 84    231      Most Recent 3 BMP's:  Recent Labs   Lab Test 09/21/21  0729  09/20/21  1208 09/20/21  0850 09/20/21  0637 09/20/21  0637 09/19/21  0948 09/19/21  0948 09/19/21  0944 09/14/21  0821 09/14/21  0821 08/31/21  0805 08/31/21  0805   NA  --   --   --   --  137  --  136  --   --  133   < > 131*   POTASSIUM 3.5 3.6 3.1*   < > 3.0*   < > 3.7  --    < > 3.8   < > 3.1*   CHLORIDE  --   --   --   --  106  --  102  --   --   --   --  97   CO2  --   --   --   --  23  --  25  --   --   --   --  29   BUN  --   --   --   --  17  --  20  --   --   --   --  14   CR  --   --   --   --  0.64  --  0.68  --   --   --   --  0.64   ANIONGAP  --   --   --   --  8  --  9  --   --   --   --  5   RANDALL  --   --   --   --  8.4*  --  8.2*  --   --   --   --  8.6   GLC  --   --   --   --  90  --  144* 144*  --   --   --  110*    < > = values in this interval not displayed.     Most Recent 2 LFT's:  Recent Labs   Lab Test 09/19/21 0948   AST 25   ALT 33   ALKPHOS 52   BILITOTAL 0.5     Most Recent INR's and Anticoagulation Dosing History:  Anticoagulation Dose History    There is no flowsheet data to display.       Most Recent 3 Troponin's:  Recent Labs   Lab Test 09/19/21 0948   TROPONIN <0.015     Most Recent Cholesterol Panel:  Recent Labs   Lab Test 08/31/21 0805   CHOL 196   *   HDL 58   TRIG 121     Most Recent 6 Bacteria Isolates From Any Culture (See EPIC Reports for Culture Details):No lab results found.  Most Recent TSH, T4 and A1c Labs:  Recent Labs   Lab Test 08/31/21 0805   TSH 3.87     No results found for this or any previous visit.    Chika Peterson PA-C  [facility]

## 2021-09-23 ENCOUNTER — TELEPHONE (OUTPATIENT)
Dept: GERIATRICS | Facility: CLINIC | Age: 86
End: 2021-09-23

## 2021-09-23 NOTE — TELEPHONE ENCOUNTER
[facility] Nurse Triage Telephone Note    Provider: NADINE Kirk CNP  Facility: [facility]             Facility Type:  [place]    Caller: Marianna [facility]- OT  Call Back Number: [phone]    Allergies:    Allergies   Allergen Reactions     Cats Other (See Comments)     Runny nose, watery eyes      Dogs Other (See Comments)     Runny nose and watery eyes      Mold Other (See Comments)     Runny nose, watery eyes         Reason for call: Request OT once weekly for 5 weeks for UB strengthening, Cognition, safety, lower body dressing    Verbal Order/Direction given by Provider: OK    Provider giving Order:  NADINE Kirk CNP    Verbal Order given to: Ashley Syed RN

## 2021-09-24 LAB
BACTERIA BLD CULT: NO GROWTH
BACTERIA BLD CULT: NO GROWTH

## 2021-09-27 ENCOUNTER — LAB REQUISITION (OUTPATIENT)
Dept: LAB | Facility: CLINIC | Age: 86
End: 2021-09-27
Payer: MEDICARE

## 2021-09-27 DIAGNOSIS — I10 ESSENTIAL (PRIMARY) HYPERTENSION: ICD-10-CM

## 2021-09-28 ENCOUNTER — TRANSFERRED RECORDS (OUTPATIENT)
Dept: HEALTH INFORMATION MANAGEMENT | Facility: CLINIC | Age: 86
End: 2021-09-28

## 2021-09-28 LAB
ANION GAP SERPL CALCULATED.3IONS-SCNC: 5 MMOL/L (ref 3–14)
BUN SERPL-MCNC: 9 MG/DL (ref 7–30)
CALCIUM SERPL-MCNC: 8.9 MG/DL (ref 8.5–10.1)
CHLORIDE BLD-SCNC: 99 MMOL/L (ref 94–109)
CO2 SERPL-SCNC: 30 MMOL/L (ref 20–32)
CREAT SERPL-MCNC: 0.62 MG/DL (ref 0.52–1.04)
GFR SERPL CREATININE-BSD FRML MDRD: 84 ML/MIN/1.73M2
GLUCOSE BLD-MCNC: 110 MG/DL (ref 70–99)
POTASSIUM BLD-SCNC: 3.7 MMOL/L (ref 3.4–5.3)
SODIUM SERPL-SCNC: 134 MMOL/L (ref 133–144)

## 2021-09-28 PROCEDURE — 36415 COLL VENOUS BLD VENIPUNCTURE: CPT | Mod: ORL | Performed by: NURSE PRACTITIONER

## 2021-09-28 PROCEDURE — 80048 BASIC METABOLIC PNL TOTAL CA: CPT | Mod: ORL | Performed by: NURSE PRACTITIONER

## 2021-10-11 NOTE — PROGRESS NOTES
[facility]IATRIC [facility]    Chief Complaint   Patient presents with     RECHECK     mood, BP     HPI:  Edmundo James is a 85 year old  (1935), who is being seen today for an episodic care visit at: [facility] (Formerly [facility]) [[id number]]. Today's concern is:     Patient is an 85 year old female with  PHI significant for HTN, HLD, OA, asthma, and dementia without official work-up.  She is seen today primarily for follow-up on her mood and BP.  Was started on Celexa about 1 month ago due to depression.  Family has not seen a huge improvement since the change, patient feels she is doing ok.  Family interested in dose increase.  Have also been working on titrating BP meds down, as meds she was initially prescribed were from several years ago and she was not consistently taking them or monitoring her BP.  We have been able to reduce her BP meds    Patient/family complain of ongoing diarrhea, that tends to happen 2-3 times per week.  Reports large, emergent, loose stool.  Denies more then 1 occurrence per day, denies n/v, or abdominal pain.  She is on Aricept and this was likely recently restarted as staff began administering medications and patient was consistently taking again.  Prior to this, she often did not take her meds.  Dementia is fairly advanced, family has not noticed improvement with Aricept and are agreeable to d'c    Patient with significant OA to right hip.  Completed working with home care therapies and felt she would benefit from consult for ortho for possible hip replacement.  Hip is still bothering her, particularly with movement.  Apt with ortho has not been scheduled yet      Allergies, and PMH/PSH reviewed in [facility] today.  REVIEW OF SYSTEMS:  10 point ROS of systems including Constitutional, Eyes, Respiratory, Cardiovascular, Gastroenterology, Genitourinary, Integumentary, Musculoskeletal, Psychiatric were all negative except for pertinent positives noted in my HPI.    Objective:    /69   Pulse 77   Temp 98.3  F (36.8  C)   Resp 18   SpO2 95%   GENERAL APPEARANCE:  Alert, in no distress, cooperative  RESP:  respiratory effort and palpation of chest normal, lungs clear to auscultation , no respiratory distress  CV:  Palpation and auscultation of heart done , regular rate and rhythm, no murmur, rub, or gallop, no edema  ABDOMEN:  no guarding or rebound, bowel sounds normal, no organomegaly  M/S:   Gait and station abnormal primarily in w/c, impaired gait due to right hip pain, weakness  SKIN:  no obvious rashes or wounds  NEURO:   speech clear, no tremor, normal strenght and tone, PERRL, poor recall of events   PSYCH:  oriented X 2, insight and judgement impaired, memory impaired , affect and mood flat     Recent labs in [facility] reviewed by me today.     Assessment/Plan:  (F33.1) Moderate episode of recurrent major depressive disorder (H)  (primary encounter diagnosis)  Comment: mood still significantly depressed  -celexa started about 1 month ago   Plan: increase, citalopram (CELEXA) 20 MG tablet        -BMP next week     (I10) Essential hypertension  Comment: working on titrating meds down given age and fall risk  -review of recent BP's 138/76, 135/65, 126/98, 105/72  -no recent falls   -metoprolol previously tapered and d'cd, Diovan decreased  Plan: continue Diovan and this time and monitor BP    (R19.7) Diarrhea, unspecified type  Comment: reported per patient about 2-3 times per week, 1 episode/day when it occurs, denies n/v, abdominal pain  -could be s/e Aricept, discussed with family and agreeable to d'c  Plan: d'c Aricept and monitor  -if continues to be an issues, consider d'c KCL as a newer med that was started and could be contributing     (G30.1,  F02.80) Late onset Alzheimer's disease without behavioral disturbance (H)  Comment: progressive and requiring more assistance  Plan: staff dispense meds, assist with cares  -d'c aricept as above, likely not beneficial     (E87.6)  Hypokalemia  Comment:   Lab Results   Component Value Date    POTASSIUM 3.7 09/28/2021     -started on supplementation in Sept  Plan: recheck next week     (M25.551) Hip pain, right  (R26.2) Difficulty walking  Comment: advanced OA to right hip  -completed work with home care  Plan: APAP and voltaren  -referral to ortho given, daughter plans to make apt still  -continue vitamin D supplementation     Electronically signed by: NADINE Rogers CNP

## 2021-10-12 ENCOUNTER — ASSISTED LIVING VISIT (OUTPATIENT)
Dept: GERIATRICS | Facility: CLINIC | Age: 86
End: 2021-10-12
Payer: MEDICARE

## 2021-10-12 VITALS
TEMPERATURE: 98.3 F | RESPIRATION RATE: 18 BRPM | SYSTOLIC BLOOD PRESSURE: 134 MMHG | OXYGEN SATURATION: 95 % | HEART RATE: 77 BPM | DIASTOLIC BLOOD PRESSURE: 69 MMHG

## 2021-10-12 DIAGNOSIS — R19.7 DIARRHEA, UNSPECIFIED TYPE: ICD-10-CM

## 2021-10-12 DIAGNOSIS — M25.551 HIP PAIN, RIGHT: ICD-10-CM

## 2021-10-12 DIAGNOSIS — E87.6 HYPOKALEMIA: ICD-10-CM

## 2021-10-12 DIAGNOSIS — R26.2 DIFFICULTY WALKING: ICD-10-CM

## 2021-10-12 DIAGNOSIS — G30.1 LATE ONSET ALZHEIMER'S DISEASE WITHOUT BEHAVIORAL DISTURBANCE (H): ICD-10-CM

## 2021-10-12 DIAGNOSIS — F33.1 MODERATE EPISODE OF RECURRENT MAJOR DEPRESSIVE DISORDER (H): Primary | ICD-10-CM

## 2021-10-12 DIAGNOSIS — F02.80 LATE ONSET ALZHEIMER'S DISEASE WITHOUT BEHAVIORAL DISTURBANCE (H): ICD-10-CM

## 2021-10-12 DIAGNOSIS — I10 ESSENTIAL HYPERTENSION: ICD-10-CM

## 2021-10-12 RX ORDER — CITALOPRAM HYDROBROMIDE 20 MG/1
20 TABLET ORAL DAILY
Qty: 30 TABLET | Refills: 3 | Status: SHIPPED | OUTPATIENT
Start: 2021-10-12 | End: 2021-12-09

## 2021-10-12 NOTE — PROGRESS NOTES
[facility] Facility Orders    1. d'c Aricept  2. Increase Celexa 20mg po every day  3. K+ recheck in 2 weeks dx: hypokalemia       NADINE Rogers CNP on 10/12/2021 at 12:58 PM

## 2021-10-12 NOTE — LETTER
10/12/2021        RE: Edmundo James  [address] [place] [address]        [facility]    Chief Complaint   Patient presents with     RECHECK     mood, BP     HPI:  Edmundo James is a 85 year old  (1935), who is being seen today for an episodic care visit at: [facility][note 3] ([facility]) [[id number]]. Today's concern is:     Patient is an 85 year old female with  PHI significant for HTN, HLD, OA, asthma, and dementia without official work-up.  She is seen today primarily for follow-up on her mood and BP.  Was started on Celexa about 1 month ago due to depression.  Family has not seen a huge improvement since the change, patient feels she is doing ok.  Family interested in dose increase.  Have also been working on titrating BP meds down, as meds she was initially prescribed were from several years ago and she was not consistently taking them or monitoring her BP.  We have been able to reduce her BP meds    Patient/family complain of ongoing diarrhea, that tends to happen 2-3 times per week.  Reports large, emergent, loose stool.  Denies more then 1 occurrence per day, denies n/v, or abdominal pain.  She is on Aricept and this was likely recently restarted as staff began administering medications and patient was consistently taking again.  Prior to this, she often did not take her meds.  Dementia is fairly advanced, family has not noticed improvement with Aricept and are agreeable to d'c    Patient with significant OA to right hip.  Completed working with home care therapies and felt she would benefit from consult for ortho for possible hip replacement.  Hip is still bothering her, particularly with movement.  Apt with ortho has not been scheduled yet      Allergies, and PMH/PSH reviewed in [facility] today.  REVIEW OF SYSTEMS:  10 point ROS of systems including Constitutional, Eyes, Respiratory, Cardiovascular, Gastroenterology, Genitourinary, Integumentary, Musculoskeletal,  Psychiatric were all negative except for pertinent positives noted in my HPI.    Objective:   /69   Pulse 77   Temp 98.3  F (36.8  C)   Resp 18   SpO2 95%   GENERAL APPEARANCE:  Alert, in no distress, cooperative  RESP:  respiratory effort and palpation of chest normal, lungs clear to auscultation , no respiratory distress  CV:  Palpation and auscultation of heart done , regular rate and rhythm, no murmur, rub, or gallop, no edema  ABDOMEN:  no guarding or rebound, bowel sounds normal, no organomegaly  M/S:   Gait and station abnormal primarily in w/c, impaired gait due to right hip pain, weakness  SKIN:  no obvious rashes or wounds  NEURO:   speech clear, no tremor, normal strenght and tone, PERRL, poor recall of events   PSYCH:  oriented X 2, insight and judgement impaired, memory impaired , affect and mood flat     Recent labs in [facility] reviewed by me today.     Assessment/Plan:  (F33.1) Moderate episode of recurrent major depressive disorder (H)  (primary encounter diagnosis)  Comment: mood still significantly depressed  -celexa started about 1 month ago   Plan: increase, citalopram (CELEXA) 20 MG tablet        -BMP next week     (I10) Essential hypertension  Comment: working on titrating meds down given age and fall risk  -review of recent BP's 138/76, 135/65, 126/98, 105/72  -no recent falls   -metoprolol previously tapered and d'cd, Diovan decreased  Plan: continue Diovan and this time and monitor BP    (R19.7) Diarrhea, unspecified type  Comment: reported per patient about 2-3 times per week, 1 episode/day when it occurs, denies n/v, abdominal pain  -could be s/e Aricept, discussed with family and agreeable to d'c  Plan: d'c Aricept and monitor  -if continues to be an issues, consider d'c KCL as a newer med that was started and could be contributing     (G30.1,  F02.80) Late onset Alzheimer's disease without behavioral disturbance (H)  Comment: progressive and requiring more assistance  Plan: staff  dispense meds, assist with cares  -d'c aricept as above, likely not beneficial     (E87.6) Hypokalemia  Comment:   Lab Results   Component Value Date    POTASSIUM 3.7 09/28/2021     -started on supplementation in Sept  Plan: recheck next week     (M25.551) Hip pain, right  (R26.2) Difficulty walking  Comment: advanced OA to right hip  -completed work with home care  Plan: APAP and voltaren  -referral to ortho given, daughter plans to make apt still  -continue vitamin D supplementation     Electronically signed by: NADINE Rogers CNP           Sincerely,        NADINE Rogers CNP

## 2021-10-19 ENCOUNTER — ASSISTED LIVING VISIT (OUTPATIENT)
Dept: GERIATRICS | Facility: CLINIC | Age: 86
End: 2021-10-19
Payer: MEDICARE

## 2021-10-19 VITALS
WEIGHT: 129.6 LBS | BODY MASS INDEX: 22.96 KG/M2 | DIASTOLIC BLOOD PRESSURE: 76 MMHG | OXYGEN SATURATION: 98 % | TEMPERATURE: 97.9 F | SYSTOLIC BLOOD PRESSURE: 138 MMHG | HEIGHT: 63 IN

## 2021-10-19 DIAGNOSIS — I10 ESSENTIAL HYPERTENSION: Primary | ICD-10-CM

## 2021-10-19 DIAGNOSIS — G30.1 LATE ONSET ALZHEIMER'S DISEASE WITHOUT BEHAVIORAL DISTURBANCE (H): ICD-10-CM

## 2021-10-19 DIAGNOSIS — E78.5 HYPERLIPIDEMIA, UNSPECIFIED HYPERLIPIDEMIA TYPE: ICD-10-CM

## 2021-10-19 DIAGNOSIS — M15.0 PRIMARY OSTEOARTHRITIS INVOLVING MULTIPLE JOINTS: ICD-10-CM

## 2021-10-19 DIAGNOSIS — R19.7 DIARRHEA, UNSPECIFIED TYPE: ICD-10-CM

## 2021-10-19 DIAGNOSIS — F33.1 MODERATE EPISODE OF RECURRENT MAJOR DEPRESSIVE DISORDER (H): ICD-10-CM

## 2021-10-19 DIAGNOSIS — F02.80 LATE ONSET ALZHEIMER'S DISEASE WITHOUT BEHAVIORAL DISTURBANCE (H): ICD-10-CM

## 2021-10-19 ASSESSMENT — MIFFLIN-ST. JEOR: SCORE: 1015.95

## 2021-10-19 NOTE — LETTER
10/19/2021        RE: Edmundo James  [id number] [place] [address]        Edmundo James is a 85 year old female seen October 19, 2021 at [facility] where she has resided for 2 years (admit 9/2019) recently turned over to [facility] and seen for initial visit.   Pt is seen in her apartment that she shares with her  Kenny, and he provides history    He reports pt has had a progressive cognitive and physical decline over the past several years.   She is no longer ambulatory, self transfers WC to chair on her own, and language is impaired as well.   In the two years they have been in [place] pt has not been seen by a medical provider.   She has medications but not taking them consistently, so now signed on for medication administration by AL staff.  Family has reported cognitive impairment that preceded the move to [place], she was on donepezil in [place], but no formal workup.   Today's complaints include arthritis pain in hips and knees, and diarrhea that occurs 1-2x/week with urgency and incontinence.  She denies shortness of breath and is not using her MDI.  Has been treated with citalopram recently for reported depression.        Pt had a symptomatic COVID 19 infection in early September 2021, then later that month she had an overnight stay at [facility] after she accidentally took her 's medications.  She was unresponsive with BP 52/27 initially.   She improved with IVF and was able to return to AL.       Past Medical History:   Diagnosis Date     Heart disease      HTN (hypertension)      Hyperlipidemia      OA (osteoarthritis) of knee      Uncomplicated asthma    Late onset Alzheimer's dementia  Depression /anxiety       Past Surgical History:   Procedure Laterality Date     CHOLECYSTECTOMY       LUMPECTOMY BREAST       TONSILLECTOMY       SH: Lives with her  [place], [place] apartment.   They moved to [place] from [place], [place] in 2019   Daughter Kandice lives locally and helps with cares.  "  Non smoker    ROS: eating well and weight has been stable, sleeps through the night.     EXAM: NAD  /76   Temp 97.9  F (36.6  C)   Ht 1.607 m (5' 3.25\")   Wt 58.8 kg (129 lb 9.6 oz)   SpO2 98%   BMI 22.78 kg/m     Neck supple without adenopathy  Lungs clear bilaterally with fair air movement   Heart RRR s1s2  Abd soft, NT, no distention or guarding,+BS  Ext without edema  Neuro: WC bound, limited verbal skills  Psych: affect flat     Last Comprehensive Metabolic Panel:  Sodium   Date Value Ref Range Status   09/28/2021 134 133 - 144 mmol/L Final     Potassium   Date Value Ref Range Status   10/26/2021 3.9 3.4 - 5.3 mmol/L Final     Chloride   Date Value Ref Range Status   09/28/2021 99 94 - 109 mmol/L Final     Carbon Dioxide (CO2)   Date Value Ref Range Status   09/28/2021 30 20 - 32 mmol/L Final     Anion Gap   Date Value Ref Range Status   09/28/2021 5 3 - 14 mmol/L Final     Glucose   Date Value Ref Range Status   09/28/2021 110 (H) 70 - 99 mg/dL Final     Urea Nitrogen   Date Value Ref Range Status   09/28/2021 9 7 - 30 mg/dL Final     Creatinine   Date Value Ref Range Status   09/28/2021 0.62 0.52 - 1.04 mg/dL Final     GFR Estimate   Date Value Ref Range Status   09/28/2021 84 >60 mL/min/1.73m2 Final     Comment:     As of July 11, 2021, eGFR is calculated by the CKD-EPI creatinine equation, without race adjustment. eGFR can be influenced by muscle mass, exercise, and diet. The reported eGFR is an estimation only and is only applicable if the renal function is stable.     Calcium   Date Value Ref Range Status   09/28/2021 8.9 8.5 - 10.1 mg/dL Final     Lab Results   Component Value Date    AST 25 09/19/2021      ALBUMIN 2.9 09/19/2021      ALKPHOS 52 09/19/2021     Lab Results   Component Value Date    WBC 2.3 09/19/2021      HGB 12.5 09/19/2021      MCV 83 09/19/2021       09/19/2021     TSH   Date Value Ref Range Status   08/31/2021 3.87 0.40 - 4.00 mU/L Final          IMP/PLAN:   (I10) " Essential hypertension  Comment:   BP Readings from Last 3 Encounters:   10/19/21 138/76   09/19/21 134/69   09/21/21 134/69      Plan: valsartan 160 mg/day, hydrochlorothiazide 12.5 mg/day with K+ replacement 20 mEq/day   Follow bps and BMP     (G30.1,  F02.80) Late onset Alzheimer's disease without behavioral disturbance (H)  Comment: loss of mobility and language, very low functional status   Plan: AL support for med admin, meals, activity   No likely benefit of donepezil, which she has been taking only intermittently, at this point.      (F33.1) Moderate episode of recurrent major depressive disorder (H)  Comment: reported by family  Plan: started on citalopram 20 mg/day last week    Follow       (M89.49) Primary osteoarthritis involving multiple joints  Comment: knees and hip, limits mobility    Plan: diclofenac gel locally, scheduled acetaminophen bid    (E78.5) Hyperlipidemia, unspecified hyperlipidemia type  Comment: no reported CV event   Plan: remains on atorvastatin 20 mg/day for primary prevention      (R19.7) Diarrhea, unspecified type  Comment: ongoing, worsened with COVID19 infection which is also when medication administration by AL staff was started.     Plan: will discontinue donepezil as likely culprit     Lary Sue MD         Sincerely,        Lary Sue MD

## 2021-10-25 ENCOUNTER — LAB REQUISITION (OUTPATIENT)
Dept: LAB | Facility: CLINIC | Age: 86
End: 2021-10-25
Payer: MEDICARE

## 2021-10-25 DIAGNOSIS — E87.6 HYPOKALEMIA: ICD-10-CM

## 2021-10-26 ENCOUNTER — TRANSFERRED RECORDS (OUTPATIENT)
Dept: HEALTH INFORMATION MANAGEMENT | Facility: CLINIC | Age: 86
End: 2021-10-26

## 2021-10-26 LAB — POTASSIUM BLD-SCNC: 3.9 MMOL/L (ref 3.4–5.3)

## 2021-10-26 PROCEDURE — 36415 COLL VENOUS BLD VENIPUNCTURE: CPT | Mod: ORL | Performed by: NURSE PRACTITIONER

## 2021-10-26 PROCEDURE — P9604 ONE-WAY ALLOW PRORATED TRIP: HCPCS | Mod: ORL | Performed by: NURSE PRACTITIONER

## 2021-10-26 PROCEDURE — 84132 ASSAY OF SERUM POTASSIUM: CPT | Mod: ORL | Performed by: NURSE PRACTITIONER

## 2021-10-28 NOTE — PROGRESS NOTES
Edmundo James is a 85 year old female seen October 19, 2021 at [facility] where she has resided for 2 years (admit 9/2019) recently turned over to [facility] and seen for initial visit.   Pt is seen in her apartment that she shares with her  Kenny, and he provides history    He reports pt has had a progressive cognitive and physical decline over the past several years.   She is no longer ambulatory, self transfers WC to chair on her own, and language is impaired as well.   In the two years they have been in [place] pt has not been seen by a medical provider.   She has medications but not taking them consistently, so now signed on for medication administration by AL staff.  Family has reported cognitive impairment that preceded the move to [place], she was on donepezil in [place], but no formal workup.   Today's complaints include arthritis pain in hips and knees, and diarrhea that occurs 1-2x/week with urgency and incontinence.  She denies shortness of breath and is not using her MDI.  Has been treated with citalopram recently for reported depression.        Pt had a symptomatic COVID 19 infection in early September 2021, then later that month she had an overnight stay at [facility] after she accidentally took her 's medications.  She was unresponsive with BP 52/27 initially.   She improved with IVF and was able to return to AL.       Past Medical History:   Diagnosis Date     Heart disease      HTN (hypertension)      Hyperlipidemia      OA (osteoarthritis) of knee      Uncomplicated asthma    Late onset Alzheimer's dementia  Depression /anxiety       Past Surgical History:   Procedure Laterality Date     CHOLECYSTECTOMY       LUMPECTOMY BREAST       TONSILLECTOMY       SH: Lives with her  [place], [place] apartment.   They moved to [place] from [place], [place] in 2019   Daughter Kandice lives locally and helps with cares.   Non smoker    ROS: eating well and weight has been stable, sleeps through the night.     EXAM:  "NAD  /76   Temp 97.9  F (36.6  C)   Ht 1.607 m (5' 3.25\")   Wt 58.8 kg (129 lb 9.6 oz)   SpO2 98%   BMI 22.78 kg/m     Neck supple without adenopathy  Lungs clear bilaterally with fair air movement   Heart RRR s1s2  Abd soft, NT, no distention or guarding,+BS  Ext without edema  Neuro: WC bound, limited verbal skills  Psych: affect flat     Last Comprehensive Metabolic Panel:  Sodium   Date Value Ref Range Status   09/28/2021 134 133 - 144 mmol/L Final     Potassium   Date Value Ref Range Status   10/26/2021 3.9 3.4 - 5.3 mmol/L Final     Chloride   Date Value Ref Range Status   09/28/2021 99 94 - 109 mmol/L Final     Carbon Dioxide (CO2)   Date Value Ref Range Status   09/28/2021 30 20 - 32 mmol/L Final     Anion Gap   Date Value Ref Range Status   09/28/2021 5 3 - 14 mmol/L Final     Glucose   Date Value Ref Range Status   09/28/2021 110 (H) 70 - 99 mg/dL Final     Urea Nitrogen   Date Value Ref Range Status   09/28/2021 9 7 - 30 mg/dL Final     Creatinine   Date Value Ref Range Status   09/28/2021 0.62 0.52 - 1.04 mg/dL Final     GFR Estimate   Date Value Ref Range Status   09/28/2021 84 >60 mL/min/1.73m2 Final     Comment:     As of July 11, 2021, eGFR is calculated by the CKD-EPI creatinine equation, without race adjustment. eGFR can be influenced by muscle mass, exercise, and diet. The reported eGFR is an estimation only and is only applicable if the renal function is stable.     Calcium   Date Value Ref Range Status   09/28/2021 8.9 8.5 - 10.1 mg/dL Final     Lab Results   Component Value Date    AST 25 09/19/2021      ALBUMIN 2.9 09/19/2021      ALKPHOS 52 09/19/2021     Lab Results   Component Value Date    WBC 2.3 09/19/2021      HGB 12.5 09/19/2021      MCV 83 09/19/2021       09/19/2021     TSH   Date Value Ref Range Status   08/31/2021 3.87 0.40 - 4.00 mU/L Final          IMP/PLAN:   (I10) Essential hypertension  Comment:   BP Readings from Last 3 Encounters:   10/19/21 138/76 "   09/19/21 134/69   09/21/21 134/69      Plan: valsartan 160 mg/day, hydrochlorothiazide 12.5 mg/day with K+ replacement 20 mEq/day   Follow bps and BMP     (G30.1,  F02.80) Late onset Alzheimer's disease without behavioral disturbance (H)  Comment: loss of mobility and language, very low functional status   Plan: AL support for med admin, meals, activity   No likely benefit of donepezil, which she has been taking only intermittently, at this point.      (F33.1) Moderate episode of recurrent major depressive disorder (H)  Comment: reported by family  Plan: started on citalopram 20 mg/day last week    Follow       (M89.49) Primary osteoarthritis involving multiple joints  Comment: knees and hip, limits mobility    Plan: diclofenac gel locally, scheduled acetaminophen bid    (E78.5) Hyperlipidemia, unspecified hyperlipidemia type  Comment: no reported CV event   Plan: remains on atorvastatin 20 mg/day for primary prevention      (R19.7) Diarrhea, unspecified type  Comment: ongoing, worsened with COVID19 infection which is also when medication administration by AL staff was started.     Plan: will discontinue donepezil as likely culprit     Lary Sue MD

## 2021-11-12 DIAGNOSIS — E55.9 VITAMIN D DEFICIENCY: ICD-10-CM

## 2021-11-12 DIAGNOSIS — E87.6 HYPOKALEMIA: ICD-10-CM

## 2021-11-12 RX ORDER — POTASSIUM CHLORIDE 1500 MG/1
TABLET, EXTENDED RELEASE ORAL
Qty: 28 TABLET | Status: SHIPPED | OUTPATIENT
Start: 2021-11-12 | End: 2021-12-21

## 2021-11-12 RX ORDER — CHOLECALCIFEROL (VITAMIN D3) 50 MCG
TABLET ORAL
Qty: 28 TABLET | Status: SHIPPED | OUTPATIENT
Start: 2021-11-12 | End: 2022-11-11

## 2021-11-23 NOTE — PROGRESS NOTES
[facility] GERIATRIC SERVICES TELEPHONE ENCOUNTER      Edmundo James is a 85 year old  (1935), Laura ERIC from [facility] called today wanting to discuss optimizing patient prior to a possible THIAGO     -Hgb was 11.4 when drawn at [facility] in November, needs to be >12 prior to surgery    Lab Results   Component Value Date    HGB 12.5 09/19/2021     hgb was normal at our last check and no s/s bleeding noted    ASSESSMENT/PLAN    I will plan to order lab work at AL facility for next week.  Also have some concern for post-up delirium given patient's dementia as well as sub-optimal rehab potential after procedure depending on patient's willingness to participate in therapy.  Care coordinator will pass onto provider at [facility]        Facility Orders  1. CBC, ferritin, B12, TSH, total iron stores next Tuesday dx: anemia     Electronically signed by:   NADINE Rogers CNP

## 2021-11-29 ENCOUNTER — LAB REQUISITION (OUTPATIENT)
Dept: LAB | Facility: CLINIC | Age: 86
End: 2021-11-29
Payer: MEDICARE

## 2021-11-29 DIAGNOSIS — D64.9 ANEMIA, UNSPECIFIED: ICD-10-CM

## 2021-11-29 ASSESSMENT — MIFFLIN-ST. JEOR: SCORE: 1013.23

## 2021-11-29 NOTE — PROGRESS NOTES
[facility] GERIATRIC SERVICES    Chief Complaint   Patient presents with     RECHECK     anemia     HPI:  Edmundo James is a 85 year old  (1935), who is being seen today for an episodic care visit at: [facility]) [[id number]]. Today's concern is:     Patient is an 85 year old female with  PHI significant for HTN, HLD, OA, depression, asthma, and dementia without official work-up.  She has extensive joint degeneration to her hip with difficulty walking and was recently seen at [facility] for discussion of possible THIAGO.  When labs were completed there, patient's Hgb was slightly low at 11.4.  I was asked to work with her to see if can get her more optimized prior to potential surgery. Also, following up on mood.  She has struggled with depression since move to [place] and was started on Celexa this fall.       She is seen in her apartment today after breakfast.  She feels she is doing ok.  Her biggest concern is the loose stools she is still occasionally experiencing.  Per patient and her  who lives with patient and present during visit, these episodes occur sporadically about 2-3 times per week.  They have not associated them during a particular time of day or found correlation with what she is eating, although she has not been paying attention to that.  Carlitos abdominal pain or n/v with episodes.  She does have a cup of coffee daily.  She denies CP, shortness of breath or difficulty sleeping.  Pain manageable as she primarily stands and pivots to transfer and is not walking.  She has also been scratching a lot in the past month to her back and arms.  Denies any new use of products such as lotions, soaps or detergents.  Her  has been rubbing Aveeno lotion on in the morning and this seems to help for a little while     Allergies, and PMH/PSH reviewed in [facility] today.  REVIEW OF SYSTEMS:  10 point ROS of systems including Constitutional, Eyes, Respiratory, Cardiovascular, Gastroenterology,  "Genitourinary, Integumentary, Musculoskeletal, Psychiatric were all negative except for pertinent positives noted in my HPI.    Objective:   /78   Pulse 72   Temp 98.5  F (36.9  C)   Ht 1.607 m (5' 3.25\")   Wt 58.5 kg (129 lb)   BMI 22.67 kg/m    GENERAL APPEARANCE:  Alert, in no distress, cooperative  RESP:  respiratory effort and palpation of chest normal, lungs clear to auscultation , no respiratory distress  CV:  Palpation and auscultation of heart done , regular rate and rhythm, no murmur, rub, or gallop, no edema  ABDOMEN:  no guarding or rebound, bowel sounds normal, no organomegaly  M/S:   Gait and station abnormal primarily in w/c, weakness, no edema/erythema joints   SKIN:  no obvious rashes or wounds, scratch marks noted on arms and mid back   NEURO:   speech clear, no tremor, normal strenght and tone, PERRL, poor recall of events   PSYCH:  oriented X 2, insight and judgement impaired, memory impaired , affect and mood flat     Recent labs in [facility] reviewed by me today.     Assessment/Plan:  (G30.1,  F02.80) Late onset Alzheimer's disease without behavioral disturbance (H)  (primary encounter diagnosis)  Comment: progressive and requiring more assistance  -tapered off Aricept with no change noted  -discussed with [facility] care coordinator, Bessie, that I have concerns about post-op delirium   Plan: staff dispense meds, assist with cares    Anemia  Comment: reported to have Hgb of 11.3 at [facility], was normal at check in August  Lab Results   Component Value Date    HGB 12.5 09/19/2021     -no reports of acute blood loss  -needs to be optimalized if has THIAGO  Plan: CBC, ferritin, B12 pending today  -consider guiac stool based on results  -will treat based on results     (F33.1) Moderate episode of recurrent major depressive disorder (H)  Comment: not optimally controlled, still with depressed mood, Celexa seems to have helped moderately but still with significantly depressed mood  -discussed with daughter " today and agreeable to start augmentation with bupropion  Plan: continue Celexa  -bupropion ER 150mg po every day, staff update with changes in mood, agitation or anxiety    (M89.49) Primary osteoarthritis involving multiple joints  (M25.551) Hip pain, right  (R26.2) Difficulty walking  Comment: needs hip replacement, was evaluated by [facility] recently with potential plans for operation  -significantly impaired gait and primarily uses w/c, pain to hip  Plan: scheduled APAP and voltaren    (E55.9) Vitamin D deficiency  Comment: on supplementation   Plan: check levels prn    (I10) Essential hypertension  Comment:   BP Readings from Last 3 Encounters:   11/29/21 130/78   10/19/21 138/76   09/19/21 134/69   -have been able to taper and d'c some of her BP meds BP with good control   Plan: continue Diovan and monitor BP    Itching  Comment: appears to have dry skin, scratching noted to arms and back  -no reports of new products used as noted in HPI  Plan: aquaphor to areas at bedtime     Loose stools  Comment: improved some since d'c of Aricept but still with sporadic loose BM's 2-3 times per week, usually one per day     Spoke with daughter, Kandice, regarding visit and POC    Electronically signed by: NADINE Rogers CNP

## 2021-11-30 ENCOUNTER — ASSISTED LIVING VISIT (OUTPATIENT)
Dept: GERIATRICS | Facility: CLINIC | Age: 86
End: 2021-11-30
Payer: MEDICARE

## 2021-11-30 VITALS
WEIGHT: 129 LBS | HEART RATE: 72 BPM | BODY MASS INDEX: 22.86 KG/M2 | TEMPERATURE: 98.5 F | HEIGHT: 63 IN | DIASTOLIC BLOOD PRESSURE: 78 MMHG | SYSTOLIC BLOOD PRESSURE: 130 MMHG

## 2021-11-30 DIAGNOSIS — I10 ESSENTIAL HYPERTENSION: ICD-10-CM

## 2021-11-30 DIAGNOSIS — D64.9 ANEMIA, UNSPECIFIED TYPE: ICD-10-CM

## 2021-11-30 DIAGNOSIS — M15.0 PRIMARY OSTEOARTHRITIS INVOLVING MULTIPLE JOINTS: ICD-10-CM

## 2021-11-30 DIAGNOSIS — R19.5 LOOSE STOOLS: ICD-10-CM

## 2021-11-30 DIAGNOSIS — L29.9 ITCHING: ICD-10-CM

## 2021-11-30 DIAGNOSIS — G30.1 LATE ONSET ALZHEIMER'S DISEASE WITHOUT BEHAVIORAL DISTURBANCE (H): Primary | ICD-10-CM

## 2021-11-30 DIAGNOSIS — E55.9 VITAMIN D DEFICIENCY: ICD-10-CM

## 2021-11-30 DIAGNOSIS — D64.9 ANEMIA, UNSPECIFIED TYPE: Primary | ICD-10-CM

## 2021-11-30 DIAGNOSIS — R26.2 DIFFICULTY WALKING: ICD-10-CM

## 2021-11-30 DIAGNOSIS — M25.551 HIP PAIN, RIGHT: ICD-10-CM

## 2021-11-30 DIAGNOSIS — F02.80 LATE ONSET ALZHEIMER'S DISEASE WITHOUT BEHAVIORAL DISTURBANCE (H): Primary | ICD-10-CM

## 2021-11-30 DIAGNOSIS — F33.1 MODERATE EPISODE OF RECURRENT MAJOR DEPRESSIVE DISORDER (H): ICD-10-CM

## 2021-11-30 LAB
ERYTHROCYTE [DISTWIDTH] IN BLOOD BY AUTOMATED COUNT: 16.6 % (ref 10–15)
HCT VFR BLD AUTO: 36.8 % (ref 35–47)
HGB BLD-MCNC: 12 G/DL (ref 11.7–15.7)
IRON SATN MFR SERPL: 24 % (ref 15–46)
IRON SERPL-MCNC: 68 UG/DL (ref 35–180)
MCH RBC QN AUTO: 29.1 PG (ref 26.5–33)
MCHC RBC AUTO-ENTMCNC: 32.6 G/DL (ref 31.5–36.5)
MCV RBC AUTO: 89 FL (ref 78–100)
PLATELET # BLD AUTO: 239 10E3/UL (ref 150–450)
RBC # BLD AUTO: 4.12 10E6/UL (ref 3.8–5.2)
TIBC SERPL-MCNC: 289 UG/DL (ref 240–430)
TSH SERPL DL<=0.005 MIU/L-ACNC: 3.03 MU/L (ref 0.4–4)
VIT B12 SERPL-MCNC: 285 PG/ML (ref 193–986)
WBC # BLD AUTO: 2.9 10E3/UL (ref 4–11)

## 2021-11-30 PROCEDURE — 83550 IRON BINDING TEST: CPT | Mod: ORL | Performed by: NURSE PRACTITIONER

## 2021-11-30 PROCEDURE — 82607 VITAMIN B-12: CPT | Mod: ORL | Performed by: NURSE PRACTITIONER

## 2021-11-30 PROCEDURE — 85027 COMPLETE CBC AUTOMATED: CPT | Mod: ORL | Performed by: NURSE PRACTITIONER

## 2021-11-30 PROCEDURE — P9603 ONE-WAY ALLOW PRORATED MILES: HCPCS | Mod: ORL | Performed by: NURSE PRACTITIONER

## 2021-11-30 PROCEDURE — 84443 ASSAY THYROID STIM HORMONE: CPT | Mod: ORL | Performed by: NURSE PRACTITIONER

## 2021-11-30 PROCEDURE — 36415 COLL VENOUS BLD VENIPUNCTURE: CPT | Mod: ORL | Performed by: NURSE PRACTITIONER

## 2021-11-30 RX ORDER — MINERAL OIL/HYDROPHIL PETROLAT
OINTMENT (GRAM) TOPICAL AT BEDTIME
Qty: 395 G | Refills: 3 | Status: SHIPPED | OUTPATIENT
Start: 2021-11-30 | End: 2022-03-28

## 2021-11-30 RX ORDER — LANOLIN ALCOHOL/MO/W.PET/CERES
1000 CREAM (GRAM) TOPICAL DAILY
Qty: 30 TABLET | Refills: 3 | Status: SHIPPED | OUTPATIENT
Start: 2021-11-30 | End: 2022-03-13

## 2021-11-30 RX ORDER — FOLIC ACID 1 MG/1
1 TABLET ORAL DAILY
Qty: 30 TABLET | Refills: 1 | Status: SHIPPED | OUTPATIENT
Start: 2021-11-30 | End: 2021-12-09

## 2021-11-30 RX ORDER — BUPROPION HYDROCHLORIDE 150 MG/1
150 TABLET ORAL EVERY MORNING
Qty: 30 TABLET | Refills: 3 | Status: SHIPPED | OUTPATIENT
Start: 2021-11-30 | End: 2021-12-14

## 2021-11-30 NOTE — LETTER
11/30/2021        RE: Edmundo James  [address] [place] [address]        [facility]    Chief Complaint   Patient presents with     RECHECK     anemia     HPI:  Edmundo James is a 85 year old  (1935), who is being seen today for an episodic care visit at: [facility] [[id number]]. Today's concern is:     Patient is an 85 year old female with  PHI significant for HTN, HLD, OA, depression, asthma, and dementia without official work-up.  She has extensive joint degeneration to her hip with difficulty walking and was recently seen at [facility] for discussion of possible THIAGO.  When labs were completed there, patient's Hgb was slightly low at 11.4.  I was asked to work with her to see if can get her more optimized prior to potential surgery. Also, following up on mood.  She has struggled with depression since move to [place] and was started on Celexa this fall.       She is seen in her apartment today after breakfast.  She feels she is doing ok.  Her biggest concern is the loose stools she is still occasionally experiencing.  Per patient and her  who lives with patient and present during visit, these episodes occur sporadically about 2-3 times per week.  They have not associated them during a particular time of day or found correlation with what she is eating, although she has not been paying attention to that.  Carlitos abdominal pain or n/v with episodes.  She does have a cup of coffee daily.  She denies CP, shortness of breath or difficulty sleeping.  Pain manageable as she primarily stands and pivots to transfer and is not walking.  She has also been scratching a lot in the past month to her back and arms.  Denies any new use of products such as lotions, soaps or detergents.  Her  has been rubbing Aveeno lotion on in the morning and this seems to help for a little while     Allergies, and PMH/PSH reviewed in [facility] today.  REVIEW OF SYSTEMS:  10 point  "ROS of systems including Constitutional, Eyes, Respiratory, Cardiovascular, Gastroenterology, Genitourinary, Integumentary, Musculoskeletal, Psychiatric were all negative except for pertinent positives noted in my HPI.    Objective:   /78   Pulse 72   Temp 98.5  F (36.9  C)   Ht 1.607 m (5' 3.25\")   Wt 58.5 kg (129 lb)   BMI 22.67 kg/m    GENERAL APPEARANCE:  Alert, in no distress, cooperative  RESP:  respiratory effort and palpation of chest normal, lungs clear to auscultation , no respiratory distress  CV:  Palpation and auscultation of heart done , regular rate and rhythm, no murmur, rub, or gallop, no edema  ABDOMEN:  no guarding or rebound, bowel sounds normal, no organomegaly  M/S:   Gait and station abnormal primarily in w/c, weakness, no edema/erythema joints   SKIN:  no obvious rashes or wounds, scratch marks noted on arms and mid back   NEURO:   speech clear, no tremor, normal strenght and tone, PERRL, poor recall of events   PSYCH:  oriented X 2, insight and judgement impaired, memory impaired , affect and mood flat     Recent labs in [facility] reviewed by me today.     Assessment/Plan:  (G30.1,  F02.80) Late onset Alzheimer's disease without behavioral disturbance (H)  (primary encounter diagnosis)  Comment: progressive and requiring more assistance  -tapered off Aricept with no change noted  -discussed with [facility] care coordinator, Bessie, that I have concerns about post-op delirium   Plan: staff dispense meds, assist with cares    Anemia  Comment: reported to have Hgb of 11.3 at [facility], was normal at check in August  Lab Results   Component Value Date    HGB 12.5 09/19/2021     -no reports of acute blood loss  -needs to be optimalized if has THIAGO  Plan: CBC, ferritin, B12 pending today  -consider guiac stool based on results  -will treat based on results     (F33.1) Moderate episode of recurrent major depressive disorder (H)  Comment: not optimally controlled, still with depressed mood, Celexa seems to " have helped moderately but still with significantly depressed mood  -discussed with daughter today and agreeable to start augmentation with bupropion  Plan: continue Celexa  -bupropion ER 150mg po every day, staff update with changes in mood, agitation or anxiety    (M89.49) Primary osteoarthritis involving multiple joints  (M25.551) Hip pain, right  (R26.2) Difficulty walking  Comment: needs hip replacement, was evaluated by [facility] recently with potential plans for operation  -significantly impaired gait and primarily uses w/c, pain to hip  Plan: scheduled APAP and voltaren    (E55.9) Vitamin D deficiency  Comment: on supplementation   Plan: check levels prn    (I10) Essential hypertension  Comment:   BP Readings from Last 3 Encounters:   11/29/21 130/78   10/19/21 138/76   09/19/21 134/69   -have been able to taper and d'c some of her BP meds BP with good control   Plan: continue Diovan and monitor BP    Itching  Comment: appears to have dry skin, scratching noted to arms and back  -no reports of new products used as noted in HPI  Plan: aquaphor to areas at bedtime     Loose stools  Comment: improved some since d'c of Aricept but still with sporadic loose BM's 2-3 times per week, usually one per day     Spoke with daughter, Kandice, regarding visit and POC    Electronically signed by: NADINE Rogers CNP             Sincerely,        NADINE Rogers CNP

## 2021-11-30 NOTE — PROGRESS NOTES
Facility Orders    1. Citrucel powder, mix 1 tablespoon in 8oz of water and give every day dx: loos stools  2. Aquaphor ointment, apply to back and bilateral arms and other itching areas at bedtime dx: dry skin  3. Bupropion ER 150mg po every day dx: depression update provider with worsening mood, agitation, anxiety     NADINE Rogers CNP on 11/30/2021 at 12:34 PM

## 2021-12-01 NOTE — PROGRESS NOTES
Patient anticipating having THIAGO in February and need Hgb optimized.  Several labs ordered for today: CBC, iron panel, B12, folic acid     Labs were WNL, but B12 on low side of normal and Hgb has trended from 13 to 12 in past 3 months  -Folic acid and ferritin were not resulted as lab had incorrect specimen container    Plan:  1. folic acid 1mg po every day  2. B12 1,000mcg po every day  3. CBC, B12, folic acid, ferritin 1/4/22    Facility Orders  1. Folic acid 1mg po every day  2. B12 1,000mcg po every day  3. CBC, B12, folic acid, ferritin 1/4/22 dx: anemia     Dolores Babb, NADINE CNP on 11/30/2021 at 8:44 PM

## 2021-12-03 ENCOUNTER — LAB REQUISITION (OUTPATIENT)
Dept: LAB | Facility: CLINIC | Age: 86
End: 2021-12-03
Payer: MEDICARE

## 2021-12-03 DIAGNOSIS — U07.1 COVID-19: ICD-10-CM

## 2021-12-05 ENCOUNTER — TELEPHONE (OUTPATIENT)
Dept: GERIATRICS | Facility: CLINIC | Age: 86
End: 2021-12-05
Payer: MEDICARE

## 2021-12-05 DIAGNOSIS — L03.012 CELLULITIS OF LEFT MIDDLE FINGER: Primary | ICD-10-CM

## 2021-12-05 RX ORDER — CEPHALEXIN 500 MG/1
500 CAPSULE ORAL 3 TIMES DAILY
Qty: 21 CAPSULE | Refills: 0 | Status: SHIPPED | OUTPATIENT
Start: 2021-12-05 | End: 2021-12-12

## 2021-12-05 NOTE — TELEPHONE ENCOUNTER
"Nursing called this morning with report of patients left middle finger at the end is a red, swollen, pus forming.    Order:  Keflex 500mg po TID x 7 day for cellulitis  Warm soak the finger for 15\" BID for 5 days    Electronically signed by Stacey Watts RN, CNP    "

## 2021-12-07 ENCOUNTER — ASSISTED LIVING VISIT (OUTPATIENT)
Dept: GERIATRICS | Facility: CLINIC | Age: 86
End: 2021-12-07
Payer: MEDICARE

## 2021-12-07 VITALS
WEIGHT: 129.6 LBS | SYSTOLIC BLOOD PRESSURE: 111 MMHG | HEIGHT: 63 IN | TEMPERATURE: 97.1 F | BODY MASS INDEX: 22.96 KG/M2 | DIASTOLIC BLOOD PRESSURE: 83 MMHG | HEART RATE: 92 BPM | RESPIRATION RATE: 14 BRPM

## 2021-12-07 DIAGNOSIS — F02.80 LATE ONSET ALZHEIMER'S DISEASE WITHOUT BEHAVIORAL DISTURBANCE (H): ICD-10-CM

## 2021-12-07 DIAGNOSIS — R29.6 FALLS FREQUENTLY: ICD-10-CM

## 2021-12-07 DIAGNOSIS — M15.0 PRIMARY OSTEOARTHRITIS INVOLVING MULTIPLE JOINTS: ICD-10-CM

## 2021-12-07 DIAGNOSIS — G30.1 LATE ONSET ALZHEIMER'S DISEASE WITHOUT BEHAVIORAL DISTURBANCE (H): ICD-10-CM

## 2021-12-07 DIAGNOSIS — R26.2 DIFFICULTY WALKING: ICD-10-CM

## 2021-12-07 DIAGNOSIS — M25.551 HIP PAIN, RIGHT: ICD-10-CM
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
## 2021-12-07 DIAGNOSIS — L03.012 CELLULITIS OF LEFT MIDDLE FINGER: ICD-10-CM

## 2021-12-07 DIAGNOSIS — M62.81 GENERALIZED MUSCLE WEAKNESS: ICD-10-CM

## 2021-12-07 PROCEDURE — U0005 INFEC AGEN DETEC AMPLI PROBE: HCPCS | Mod: ORL | Performed by: NURSE PRACTITIONER

## 2021-12-07 ASSESSMENT — MIFFLIN-ST. JEOR: SCORE: 1006.99

## 2021-12-07 NOTE — LETTER
12/7/2021        RE: Edmundo James  [address] [place] [address]        [facility]    Chief Complaint   Patient presents with     Nursing Home Acute     FALL     HPI:  Edmundo James is a 86 year old  (1935), who is being seen today for an episodic care visit at: [facility] [[id number]]. Today's concern is:     Patient is an 86 year old female with  PHI significant for HTN, HLD, OA, depression, asthma, and dementia without official work-up.  She has extensive joint degeneration to her hip and primarily w/c bound.  Has been seen by [facility] for possible THIAGO.  She is seen today because she has had 4 falls this past week, most recent fall occurred this morning.  All falls have occurred when she is attempting to self transfer herself and legs give out.  Due to her advanced dementia, she is a very poor historian.  She denies dizziness, lightheadedness, palpitations, CP or h/a with the falls. Thankfully, she has not had injury with any of these falls.  Her  lives with her and provides much of the history.  He tells me all the falls have been more of a lowering to the ground as her legs give out.  Patient denies/no reports of fever, chills, dysuria, flank pain or other signs of infection. She is incontinent of stool, and sits in soiled briefs at times, making her more at risk for UTI.  She is poor historian, so unsure if she is able to report UTI symptoms     Reports of cellulitis this weekend to finger on left hand.  She was started on keflex by on-call with warm soaks and finger is looking and feeling much better    She has a very high bed in her room that is difficult for to get into or out of.  Family wondering if can get a hospital bed.        Allergies, and PMH/PSH reviewed in [facility] today.  REVIEW OF SYSTEMS:  Limited secondary to cognitive impairment but today pt reports but as noted in HPI    Objective:   /83   Pulse 92   Temp 97.1  F  "(36.2  C)   Resp 14   Ht 1.6 m (5' 3\")   Wt 58.8 kg (129 lb 9.6 oz)   BMI 22.96 kg/m    GENERAL APPEARANCE:  Alert, in no distress, cooperative  RESP:  respiratory effort and palpation of chest normal, lungs clear to auscultation , no respiratory distress  CV:  Palpation and auscultation of heart done , regular rate and rhythm, no murmur, rub, or gallop, no edema  ABDOMEN:  no guarding or rebound, bowel sounds normal, no organomegaly  M/S:   Gait and station abnormal primarily in w/c, weakness, no edema/erythema joints   NEURO:   speech clear, no tremor, normal strenght and tone, PERRL, poor recall of events   Skin: finger to left hand with no increased warmth, non-tender, open area healing and without drainage  PSYCH:  oriented X 2, very poor insight and judgement impaired, memory impaired , affect and mood flat     Recent labs in [facility] reviewed by me today.     Assessment/Plan:  (W19.XXXA) Fall, initial encounter  (primary encounter diagnosis)  (M62.81) Generalized muscle weakness  (R26.2) Difficulty walking  (R29.6) Falls frequently  Comment: 4 falls this week, thankfully no injury. Declines ER for further w/u  -denies any pain   -no reports of dizziness/lightheadedness, CP, h/a, ect  -no s/s of current infection  Plan: CBC and BMP Thursday when lab will next come to facility  -UA/UC- patient incontinent of stool and unable to reliably report symptoms   -home care therapy   -if work-up unremarkable and patient continues to fall will likely need a memory care unit for higher level of care.  Poor judgment and assessment of current abilities and self transferring   -patient in care suites with lots of assistance from staff  -re-educated patient on importance of waiting for staff for transfers   -needs hospital bed for safety   -home care for therapy    (G30.1,  F02.80) Late onset Alzheimer's disease without behavioral disturbance (H)  Comment: progressive and requiring more assistance, advanced with very little " insight and judgement into abilities  -tapered off Aricept with no change noted  -discussed with [facility] care coordinator, Bessie, that I have concerns about post-op delirium   Plan: staff dispense meds, assist with cares    (M25.551) Hip pain, right  (M89.49) Primary osteoarthritis involving multiple joints  Comment: needs hip replacement, was evaluated by [facility] recently with potential plans for operation  -significantly impaired gait and primarily uses w/c, pain to hip  Plan: scheduled APAP and voltaren  -needs hospital bed      (L03.012) Cellulitis of left middle finger  Comment: noted over the weekend  -started on keflex and improving  Plan: continue keflex and warm water soaks          Electronically signed by: NADINE Rogers CNP       Documentation of Face-to-Face and Certification for Home Health Services     Patient: Edmundo James   YOB: 1935  MR Number: [id number]  Today's Date: 12/7/2021    I certify that patient: Edmundo James is under my care and that I, or a nurse practitioner or physician's assistant working with me, had a face-to-face encounter that meets the physician face-to-face encounter requirements with this patient on: 12/7/2021.    This encounter with the patient was in whole, or in part, for the following medical condition, which is the primary reason for home health care: frequent falls, alzheimer's disease, impaired gait, htn.    I certify that, based on my findings, the following services are medically necessary home health services: Occupational Therapy and Physical Therapy.    My clinical findings support the need for the above services because: Occupational Therapy Services are needed to assess and treat cognitive ability and address ADL safety due to impairment in falls, worsening alzheimer's. and Physical Therapy Services are needed to assess and treat the following functional impairments: falls, weakness.    Further, I certify that my clinical  findings support that this patient is homebound (i.e. absences from home require considerable and taxing effort and are for medical reasons or [employer] or infrequently or of short duration when for other reasons) because: Requires assistance of another person or specialized equipment to access medical services because patient: Is prone to wander/get lost without assistance., Is unable to operate assistive equipment on their own. and Requires supervision of another for safe transfer...    Based on the above findings. I certify that this patient is confined to the home and needs intermittent skilled nursing care, physical therapy and/or speech therapy.  The patient is under my care, and I have initiated the establishment of the plan of care.  This patient will be followed by a physician who will periodically review the plan of care.  Physician/Provider to provide follow up care: Dolores Babb    Responsible Medicare certified PECOS Physician: Zeeshan  Physician Signature: See electronic signature associated with these discharge orders.  Date: 12/7/2021    The patient does  require positioning of the body in ways not feasible with an ordinary bed due to a medical condition that is expected to last at least 1 month due to weakness, severe joint and hip pain, impaired gait, frequent falls.   The patient does  require, for the alleviation of pain, postioning of the body in ways not feasible with an ordinary bed. Patient with significant hip and knee pain, requires position changes for pain management and safe transfers  The patient does not require the head of bed elevated more than 30* most of the time due to CHF, chronic pulmonary disease or aspiration.  The patient does not require traction that can only be attached to a hospital bed.  The patient does  require a bed height different than a fixed height hospital bed to permit tranfers to wheelchair or standing position. Requires adjustment of  "bed height to allow for safe transfers  The patient does  require frequent or immediate changes in body position due to pain r/t OA, hip pain.       [facility] DME Order/Prescription    Date: December 7, 2021  Patient: Edmundo James, 1935    DME ordered:  Hospital bed: fully electronic with pair side rails    Needing above DME for \"99\" length of need for the following medical necessity:    (W19.XXXA) Fall, initial encounter  (primary encounter diagnosis)  (M62.81) Generalized muscle weakness  (G30.1,  F02.80) Late onset Alzheimer's disease without behavioral disturbance (H)  (M25.551) Hip pain, right  (M89.49) Primary osteoarthritis involving multiple joints  (R26.2) Difficulty walking  (R29.6) Falls frequently      Facility staff/TC to contact DME company to get their order form for provider to fill out    ELECTRONICALLY SIGNED BY ADRIANE CERTIFIED PROVIDER:  NADINE Rogers CNP   NPI: [id number]  [facility]  [address] [place] [address], [address]  [place] [address]                Sincerely,        NADINE Rogers CNP    "

## 2021-12-07 NOTE — PROGRESS NOTES
"[facility] GERIATRIC SERVICES    Chief Complaint   Patient presents with     Nursing Home Acute     FALL     HPI:  Edmundo James is a 86 year old  (1935), who is being seen today for an episodic care visit at: [facility] ([facility]) [[id number]]. Today's concern is:     Patient is an 86 year old female with  PHI significant for HTN, HLD, OA, depression, asthma, and dementia without official work-up.  She has extensive joint degeneration to her hip and primarily w/c bound.  Has been seen by [facility] for possible THIAGO.  She is seen today because she has had 4 falls this past week, most recent fall occurred this morning.  All falls have occurred when she is attempting to self transfer herself and legs give out.  Due to her advanced dementia, she is a very poor historian.  She denies dizziness, lightheadedness, palpitations, CP or h/a with the falls. Thankfully, she has not had injury with any of these falls.  Her  lives with her and provides much of the history.  He tells me all the falls have been more of a lowering to the ground as her legs give out.  Patient denies/no reports of fever, chills, dysuria, flank pain or other signs of infection. She is incontinent of stool, and sits in soiled briefs at times, making her more at risk for UTI.  She is poor historian, so unsure if she is able to report UTI symptoms     Reports of cellulitis this weekend to finger on left hand.  She was started on keflex by on-call with warm soaks and finger is looking and feeling much better    She has a very high bed in her room that is difficult for to get into or out of.  Family wondering if can get a hospital bed.        Allergies, and PMH/PSH reviewed in EPIC today.  REVIEW OF SYSTEMS:  Limited secondary to cognitive impairment but today pt reports but as noted in HPI    Objective:   /83   Pulse 92   Temp 97.1  F (36.2  C)   Resp 14   Ht 1.6 m (5' 3\")   Wt 58.8 kg (129 lb 9.6 oz)   BMI 22.96 kg/m  "   GENERAL APPEARANCE:  Alert, in no distress, cooperative  RESP:  respiratory effort and palpation of chest normal, lungs clear to auscultation , no respiratory distress  CV:  Palpation and auscultation of heart done , regular rate and rhythm, no murmur, rub, or gallop, no edema  ABDOMEN:  no guarding or rebound, bowel sounds normal, no organomegaly  M/S:   Gait and station abnormal primarily in w/c, weakness, no edema/erythema joints   NEURO:   speech clear, no tremor, normal strenght and tone, PERRL, poor recall of events   Skin: finger to left hand with no increased warmth, non-tender, open area healing and without drainage  PSYCH:  oriented X 2, very poor insight and judgement impaired, memory impaired , affect and mood flat     Recent labs in EPIC reviewed by me today.     Assessment/Plan:  (W19.XXXA) Fall, initial encounter  (primary encounter diagnosis)  (M62.81) Generalized muscle weakness  (R26.2) Difficulty walking  (R29.6) Falls frequently  Comment: 4 falls this week, thankfully no injury. Declines ER for further w/u  -denies any pain   -no reports of dizziness/lightheadedness, CP, h/a, ect  -no s/s of current infection  Plan: CBC and BMP Thursday when lab will next come to facility  -UA/UC- patient incontinent of stool and unable to reliably report symptoms   -home care therapy   -if work-up unremarkable and patient continues to fall will likely need a memory care unit for higher level of care.  Poor judgment and assessment of current abilities and self transferring   -patient in care suites with lots of assistance from staff  -re-educated patient on importance of waiting for staff for transfers   -needs hospital bed for safety   -home care for therapy    (G30.1,  F02.80) Late onset Alzheimer's disease without behavioral disturbance (H)  Comment: progressive and requiring more assistance, advanced with very little insight and judgement into abilities  -tapered off Aricept with no change noted  -discussed  with [facility] care coordinator, Bessie, that I have concerns about post-op delirium   Plan: staff dispense meds, assist with cares    (M25.551) Hip pain, right  (M89.49) Primary osteoarthritis involving multiple joints  Comment: needs hip replacement, was evaluated by [facility] recently with potential plans for operation  -significantly impaired gait and primarily uses w/c, pain to hip  Plan: scheduled APAP and voltaren  -needs hospital bed      (L03.012) Cellulitis of left middle finger  Comment: noted over the weekend  -started on keflex and improving  Plan: continue keflex and warm water soaks          Electronically signed by: NADINE Rogers CNP       Documentation of Face-to-Face and Certification for Home Health Services     Patient: Edmundo James   YOB: 1935  MR Number: [id number]  Today's Date: 12/7/2021    I certify that patient: Edmundo James is under my care and that I, or a nurse practitioner or physician's assistant working with me, had a face-to-face encounter that meets the physician face-to-face encounter requirements with this patient on: 12/7/2021.    This encounter with the patient was in whole, or in part, for the following medical condition, which is the primary reason for home health care: frequent falls, alzheimer's disease, impaired gait, htn.    I certify that, based on my findings, the following services are medically necessary home health services: Occupational Therapy and Physical Therapy.    My clinical findings support the need for the above services because: Occupational Therapy Services are needed to assess and treat cognitive ability and address ADL safety due to impairment in falls, worsening alzheimer's. and Physical Therapy Services are needed to assess and treat the following functional impairments: falls, weakness.    Further, I certify that my clinical findings support that this patient is homebound (i.e. absences from home require considerable and  taxing effort and are for medical reasons or [employer] services or infrequently or of short duration when for other reasons) because: Requires assistance of another person or specialized equipment to access medical services because patient: Is prone to wander/get lost without assistance., Is unable to operate assistive equipment on their own. and Requires supervision of another for safe transfer...    Based on the above findings. I certify that this patient is confined to the home and needs intermittent skilled nursing care, physical therapy and/or speech therapy.  The patient is under my care, and I have initiated the establishment of the plan of care.  This patient will be followed by a physician who will periodically review the plan of care.  Physician/Provider to provide follow up care: Dolores Babb    Responsible Medicare certified PECOS Physician: Skinny/Vikram  Physician Signature: See electronic signature associated with these discharge orders.  Date: 2021      Face to Face and Medical Necessity Statement for DME Provider visit    Demographic Information on Edmundo James:  Gender: female  : 1935  [address] [place] [address]  [phone] (home)     Medical Record: [id number]  Social Security Number: [id number]  Primary Care Provider: Dolores Babb  Insurance: Payor: MEDICARE / Plan: MEDICARE / Product Type: Medicare /     HPI:   Edmundo James is a 86 year old  (1935), who is being seen today for a face to face provider visit at [facility]; medical necessity statement for DME included. This patient requires the following:  DME Ordered and Medical Necessity Statement     Fully electric hospital bed with pair half rails    The patient does  require positioning of the body in ways not feasible with an ordinary bed due to a medical condition that is expected to last at least 1 month due to weakness, severe joint and hip pain, impaired gait,  "frequent falls, alzheimer's disease.   The patient does  require, for the alleviation of pain, postioning of the body in ways not feasible with an ordinary bed. Patient with significant hip and knee pain, requires position changes for pain management and safe transfers  The patient does require the head of bed elevated more than 30* most of the time due to CHF, chronic pulmonary disease or aspiration.  Aspiration risk due to dementia  The patient does not require traction that can only be attached to a hospital bed.  The patient does  require a bed height different than a fixed height hospital bed to permit tranfers to wheelchair or standing position. Requires adjustment of bed height to allow for safe transfers  The patient does  require frequent or immediate changes in body position due to pain r/t OA, hip pain.  Patient currently unable to change positions independently due to Alzheimer's, weakness, and frequent falls.           Pt needing above DME with expected length of need of 99   years  due to medical necessity associated with following diagnosis:     Fall, initial encounter  Generalized muscle weakness  Late onset Alzheimer's disease without behavioral disturbance (H)  Hip pain, right  Cellulitis of left middle finger  Primary osteoarthritis involving multiple joints  Difficulty walking  Falls frequently      PMH   has a past medical history of Heart disease, HTN (hypertension), Hyperlipidemia, OA (osteoarthritis) of knee, and Uncomplicated asthma.    ROS:Limited secondary to cognitive impairment but as noted in HPI    EXAM  Vitals: /83   Pulse 92   Temp 97.1  F (36.2  C)   Resp 14   Ht 1.6 m (5' 3\")   Wt 58.8 kg (129 lb 9.6 oz)   BMI 22.96 kg/m  ;BMI= Body mass index is 22.96 kg/m .  GENERAL APPEARANCE:  Alert, in no distress, cooperative  RESP:  respiratory effort and palpation of chest normal, lungs clear to auscultation , no respiratory distress  CV:  Palpation and auscultation of heart " "done , regular rate and rhythm, no murmur, rub, or gallop, no edema  ABDOMEN:  no guarding or rebound, bowel sounds normal, no organomegaly  M/S:   Gait and station abnormal primarily in w/c, weakness, no edema/erythema joints   NEURO:   speech clear, no tremor, normal strenght and tone, PERRL, poor recall of events   Skin: finger to left hand with no increased warmth, non-tender, open area healing and without drainage  PSYCH:  oriented X 2, very poor insight and judgement impaired, memory impaired , affect and mood flat       ASSESSMENT/PLAN:  1. Fall, initial encounter    2. Generalized muscle weakness    3. Late onset Alzheimer's disease without behavioral disturbance (H)    4. Hip pain, right    5. Cellulitis of left middle finger    6. Primary osteoarthritis involving multiple joints    7. Difficulty walking    8. Falls frequently        Orders:  1. Facility staff/TC to contact DME company to get their order form for provider to fill out    ELECTRONICALLY SIGNED BY ADRIANE CERTIFIED PROVIDER:  NADINE Rogers CNP   NPI: [id number]  [facility]  [address] [place] [address], [address]  [place], [place] [id number] [place] [address] DME Order/Prescription    Date: December 7, 2021  Patient: Edmundo James, 1935    DME ordered:  Hospital bed: fully electronic with pair side rails    Needing above DME for \"99\" length of need for the following medical necessity:    (W19.XXXA) Fall, initial encounter  (primary encounter diagnosis)  (M62.81) Generalized muscle weakness  (G30.1,  F02.80) Late onset Alzheimer's disease without behavioral disturbance (H)  (M25.551) Hip pain, right  (M89.49) Primary osteoarthritis involving multiple joints  (R26.2) Difficulty walking  (R29.6) Falls frequently      Facility staff/TC to contact DME company to get their order form for provider to fill out    ELECTRONICALLY SIGNED BY ADRIANE CERTIFIED PROVIDER:  NADINE Rogers CNP   NPI: " [id number]  [facility]  [address], [address]  [place] [address]      Face to Face and Medical Necessity Statement for DME Provider visit    Demographic Information on Edmundo James:  Gender: female  : 1935  [address] [place] [address]  [phone] (home)     Medical Record: [id number]  Social Security Number: [id number]  Primary Care Provider: Dolores Babb  Insurance: Payor: MEDICARE / Plan: MEDICARE / Product Type: Medicare /     HPI:   Edmundo James is a 86 year old  (1935), who is being seen today for a face to face provider visit at [facility]; medical necessity statement for DME included. This patient requires the following:  DME Ordered and Medical Necessity Statement     Wheelchair Documentation  Size: 18''X18'' michael height to allow Pt to self propel using B LE  Corresponding cushion: Yes: to prevent skin breakdown  Standard foot rests: Yes  Elevating leg rests: No  Arm rests: Yes: desk top length armrests  Lap tray: No  Dose the patient use oxygen? No   Is the patient able to propel wheelchair? Yes If no why not?      1. The patient has mobility limitations that impairs their ability to participate in one or more mobility related activities: Toileting, Grooming and Bathing.  The wheelchair is suitable and necessary for use in the patient's home.  2. The patient's mobility limitations cannot be safely resolved by using a cane/walker:No    Reason why a cane or walker will not meet the patient's needs. (ie: balance, tolerance, level of assistance) A cane or walker will not allow patient to participate in ADLs at home safely and within a timely manner due to dementia, weakness, and frequent falls.  She cannot hold onto a walker and support herself and a cane does not provide enough support to allow safe ambulation.  3. The patients home has adequate access to use a manual wheelchair:Yes lives in AL facility equipped for DME  4. The use of  "a manual wheelchair on a regular basis will improve the patients ability to participate in mobility related ADL's at home:Yes  5. The patient is willing to use a manual wheelchair at home:Yes  6. The patient has adequate upper body strength and the mental capability to safely use a manual wheelchair and/or has a caregiver that is able to assist: Yes  7. Does the patient have a lower extremity injury or edema?No  Reason for Type of Wheelchair  Patient weight: 129lbs 5'3''  Light Weight Wheelchair: Patient is unable to self-propel a standard wheelchair in the home but can self propel a light weight wheelchair.        Pt needing above DME with expected length of need of 99   years  due to medical necessity associated with following diagnosis:     Fall, initial encounter  Generalized muscle weakness  Late onset Alzheimer's disease without behavioral disturbance (H)  Hip pain, right  Cellulitis of left middle finger  Primary osteoarthritis involving multiple joints  Difficulty walking  Falls frequently      PMH   has a past medical history of Heart disease, HTN (hypertension), Hyperlipidemia, OA (osteoarthritis) of knee, and Uncomplicated asthma.    ROS:Limited secondary to cognitive impairment but as noted in HPI    EXAM  Vitals: /83   Pulse 92   Temp 97.1  F (36.2  C)   Resp 14   Ht 1.6 m (5' 3\")   Wt 58.8 kg (129 lb 9.6 oz)   BMI 22.96 kg/m  ;BMI= Body mass index is 22.96 kg/m .  GENERAL APPEARANCE:  Alert, in no distress, cooperative  RESP:  respiratory effort and palpation of chest normal, lungs clear to auscultation , no respiratory distress  CV:  Palpation and auscultation of heart done , regular rate and rhythm, no murmur, rub, or gallop, no edema  ABDOMEN:  no guarding or rebound, bowel sounds normal, no organomegaly  M/S:   Gait and station abnormal primarily in w/c, weakness, no edema/erythema joints   NEURO:   speech clear, no tremor, normal strenght and tone, PERRL, poor recall of events   Skin: " finger to left hand with no increased warmth, non-tender, open area healing and without drainage  PSYCH:  oriented X 2, very poor insight and judgement impaired, memory impaired , affect and mood flat       ASSESSMENT/PLAN:  1. Fall, initial encounter    2. Generalized muscle weakness    3. Late onset Alzheimer's disease without behavioral disturbance (H)    4. Hip pain, right    5. Cellulitis of left middle finger    6. Primary osteoarthritis involving multiple joints    7. Difficulty walking    8. Falls frequently            ELECTRONICALLY SIGNED BY PECOS CERTIFIED PROVIDER:  NADINE Rogers CNP   NPI: [id number]  [facility]  [address] [place] [address], [address]  [place] [address]

## 2021-12-08 ENCOUNTER — LAB REQUISITION (OUTPATIENT)
Dept: LAB | Facility: CLINIC | Age: 86
End: 2021-12-08
Payer: MEDICARE

## 2021-12-08 DIAGNOSIS — R53.1 WEAKNESS: ICD-10-CM

## 2021-12-08 DIAGNOSIS — R30.0 DYSURIA: ICD-10-CM

## 2021-12-08 LAB
ALBUMIN UR-MCNC: NEGATIVE MG/DL
APPEARANCE UR: CLEAR
BILIRUB UR QL STRIP: NEGATIVE
COLOR UR AUTO: YELLOW
GLUCOSE UR STRIP-MCNC: NEGATIVE MG/DL
HGB UR QL STRIP: NEGATIVE
KETONES UR STRIP-MCNC: NEGATIVE MG/DL
LEUKOCYTE ESTERASE UR QL STRIP: NEGATIVE
MUCOUS THREADS #/AREA URNS LPF: PRESENT /LPF
NITRATE UR QL: NEGATIVE
PH UR STRIP: 5.5 [PH] (ref 5–7)
RBC URINE: <1 /HPF
SARS-COV-2 RNA RESP QL NAA+PROBE: NEGATIVE
SP GR UR STRIP: 1.02 (ref 1–1.03)
SQUAMOUS EPITHELIAL: 2 /HPF
UROBILINOGEN UR STRIP-MCNC: NORMAL MG/DL
WBC URINE: 0 /HPF

## 2021-12-08 PROCEDURE — 81001 URINALYSIS AUTO W/SCOPE: CPT | Mod: ORL | Performed by: NURSE PRACTITIONER

## 2021-12-09 DIAGNOSIS — I10 ESSENTIAL HYPERTENSION: Primary | ICD-10-CM

## 2021-12-09 DIAGNOSIS — F33.1 MODERATE EPISODE OF RECURRENT MAJOR DEPRESSIVE DISORDER (H): ICD-10-CM

## 2021-12-09 DIAGNOSIS — D64.9 ANEMIA, UNSPECIFIED TYPE: ICD-10-CM

## 2021-12-09 LAB
ANION GAP SERPL CALCULATED.3IONS-SCNC: 6 MMOL/L (ref 3–14)
BUN SERPL-MCNC: 16 MG/DL (ref 7–30)
CALCIUM SERPL-MCNC: 9.1 MG/DL (ref 8.5–10.1)
CHLORIDE BLD-SCNC: 94 MMOL/L (ref 94–109)
CO2 SERPL-SCNC: 27 MMOL/L (ref 20–32)
CREAT SERPL-MCNC: 0.48 MG/DL (ref 0.52–1.04)
ERYTHROCYTE [DISTWIDTH] IN BLOOD BY AUTOMATED COUNT: 15.5 % (ref 10–15)
GFR SERPL CREATININE-BSD FRML MDRD: 90 ML/MIN/1.73M2
GLUCOSE BLD-MCNC: 104 MG/DL (ref 70–99)
HCT VFR BLD AUTO: 38.1 % (ref 35–47)
HGB BLD-MCNC: 12.3 G/DL (ref 11.7–15.7)
MCH RBC QN AUTO: 28.7 PG (ref 26.5–33)
MCHC RBC AUTO-ENTMCNC: 32.3 G/DL (ref 31.5–36.5)
MCV RBC AUTO: 89 FL (ref 78–100)
PLATELET # BLD AUTO: 302 10E3/UL (ref 150–450)
POTASSIUM BLD-SCNC: 3.6 MMOL/L (ref 3.4–5.3)
RBC # BLD AUTO: 4.28 10E6/UL (ref 3.8–5.2)
SODIUM SERPL-SCNC: 127 MMOL/L (ref 133–144)
WBC # BLD AUTO: 4.4 10E3/UL (ref 4–11)

## 2021-12-09 PROCEDURE — P9604 ONE-WAY ALLOW PRORATED TRIP: HCPCS | Mod: ORL | Performed by: NURSE PRACTITIONER

## 2021-12-09 PROCEDURE — 85027 COMPLETE CBC AUTOMATED: CPT | Mod: ORL | Performed by: NURSE PRACTITIONER

## 2021-12-09 PROCEDURE — 36415 COLL VENOUS BLD VENIPUNCTURE: CPT | Mod: ORL | Performed by: NURSE PRACTITIONER

## 2021-12-09 PROCEDURE — 80048 BASIC METABOLIC PNL TOTAL CA: CPT | Mod: ORL | Performed by: NURSE PRACTITIONER

## 2021-12-09 RX ORDER — FOLIC ACID 1 MG/1
TABLET ORAL
Qty: 36 TABLET | Refills: 11 | Status: SHIPPED | OUTPATIENT
Start: 2021-12-09 | End: 2022-05-31

## 2021-12-09 RX ORDER — CITALOPRAM HYDROBROMIDE 10 MG/1
5 TABLET ORAL DAILY
Qty: 30 TABLET | Refills: 3 | Status: SHIPPED | OUTPATIENT
Start: 2021-12-16 | End: 2021-12-14 | Stop reason: SINTOL

## 2021-12-09 RX ORDER — VALSARTAN 160 MG/1
160 TABLET ORAL DAILY
Qty: 30 TABLET | Refills: 3 | Status: SHIPPED | OUTPATIENT
Start: 2021-12-09 | End: 2021-12-21 | Stop reason: DRUGHIGH

## 2021-12-09 RX ORDER — CITALOPRAM HYDROBROMIDE 20 MG/1
10 TABLET ORAL DAILY
Qty: 4 TABLET | Refills: 0 | Status: SHIPPED | OUTPATIENT
Start: 2021-12-09 | End: 2021-12-14 | Stop reason: SINTOL

## 2021-12-13 ENCOUNTER — TELEPHONE (OUTPATIENT)
Dept: GERIATRICS | Facility: CLINIC | Age: 86
End: 2021-12-13
Payer: MEDICARE

## 2021-12-13 ENCOUNTER — LAB REQUISITION (OUTPATIENT)
Dept: LAB | Facility: CLINIC | Age: 86
End: 2021-12-13
Payer: MEDICARE

## 2021-12-13 DIAGNOSIS — E87.1 HYPO-OSMOLALITY AND HYPONATREMIA: ICD-10-CM

## 2021-12-13 ASSESSMENT — MIFFLIN-ST. JEOR: SCORE: 1004.27

## 2021-12-13 NOTE — PROGRESS NOTES
[facility]ERIATRIC SERVICES    Chief Complaint   Patient presents with     RECHECK     CONFUSION     HPI:  Edmundo James is a 86 year old  (1935), who is being seen today for an episodic care visit at: [facility] ([facility]) [[id number]]. Today's concern is:     Patient is an 86 year old female with  PHI significant for HTN, HLD, OA, depression, asthma, and dementia without official work-up.  She has extensive joint degeneration to her hip and primarily w/c bound.  Has been seen by [facility] for possible THIAGO and tentatively scheduled for surgery in February .     She had 4 falls last week and declined ER.  Work-up in facility with UA without acute findings, labs with hyponatremia with Na of 127.  Her Celexa is being tapered and d'cd and her hydrochlorothiazide was discontinued. She had another fall on Friday without injury.  Staff and  report she has seemed more confused in past few days     She is found in her apartment after breakfast in her wheel chair. She is a very poor historian and  provides much of the history.  He reports fall occurred when staff were with patient, her knee gave out and she was lowered to the ground.  She denies pain since the fall.  She denies CP, shortness of breath, dizziness or lightheadedness.  She has a cough and  states has been increased in past couple of days.  She has been talking non-sensical per , not knowing where she is, or making statements that make no sense.  It appears most of these instances have occurred in the evening.  She in unaware of this and thinks she is doing just fine.  Her HR is elevated today during our visit at , she is unaware of this and denies any palpitation, dizziness or CP.  Most recent med changes was addition of Buproprion for mood and family is concerned this may be making her more confused.  They have not noticed change in mood with taper of her Celexa.  She has labs ordered and pending for today.   "Homecare therapy did their evaluation this weekend     Allergies, and PMH/PSH reviewed in [facility] today.  REVIEW OF SYSTEMS:  Limited secondary to cognitive impairment but as noted in HPI    Objective:   BP (!) 145/93   Pulse 94   Temp 97.8  F (36.6  C)   Resp 18   Ht 1.6 m (5' 3\")   Wt 58.5 kg (129 lb)   SpO2 91%   BMI 22.85 kg/m    GENERAL APPEARANCE:  Alert, in no distress, cooperative, sitting in w/c  RESP:  respiratory effort and palpation of chest normal, lungs clear to auscultation , no respiratory distress, non-productive cough  CV:  Palpation and auscultation of heart done , regular rate and rhythm but tachy at , no murmur, rub, or gallop, no edema  ABDOMEN:  no guarding or rebound, bowel sounds normal, no organomegaly  M/S:   Gait and station abnormal primarily in w/c, weakness, no edema/erythema joints   NEURO:   speech clear, no tremor, normal strenght and tone, PERRL, poor recall of events   PSYCH:  oriented X 2, very poor insight and judgement impaired, memory impaired , affect and mood flat        Recent labs in [facility] reviewed by me today.      CBC, BMP    Assessment/Plan:  (G30.1,  F02.80) Late onset Alzheimer's disease without behavioral disturbance (H)  (primary encounter diagnosis)  Comment: progressive and requiring more assistance, advanced with very little insight and judgement into abilities  -tapered off Aricept with no change noted  -discussed with [facility] care coordinator, Bessie, that I have concerns about post-op delirium   -discussed with family today that her increased confusion and impaired judgement could be r/t to progressive dementia, but as noted below, we are working-up other potential causes/contributors   Plan: staff dispense meds, assist with cares  -home care     (F33.1) Moderate episode of recurrent major depressive disorder (H)  Comment: worsened since pandemic  -was started on Celexa with some improvement, with recent hyponatremia, Celexa is being tapered and d'cd, " last does today  -Bupropion started as well in November, family concerned may be causing confusion as was most recent med change  Plan: monitor mood with taper of Celexa  -d'c Bupropion  -monitor mood     (I10) Essential hypertension  Comment: was previously on valsartan-hydrochlorothiazide combo pill, I d'cd hydrochlorothiazide with hyponatremia noted on labs  Plan: continue valsartan  -staff to monitor BP daily with recent BP change and update me next week    (W19.XXXA) Fall, initial encounter  (M62.81) Generalized muscle weakness  (M89.49) Primary osteoarthritis involving multiple joints  Comment: poor insight and judgement into situations, falls occurring due to attempting to self transfer  -denies/no reports of dizziness, lightheadedness, CP, shortness of breath  -family reports patient c/o knee pain, history of bad OA to knees   Plan: home care therapy  -on vitamin D  -increase APAP TID, voltaren apin  -care suites for increased assistance  -icy hot patches to knees       (R41.0) Confusion  Comment: reported by staff and  to seem more confused past few days  -labs pending for today  -no current s/s infection noted  -Na was 127 last week and med adjusted labs pending for today   -discussed with family that confusion could be r/t progression in dementia, it appears confusion worsens in the evening.  UA was normal last week, checking chest x-ray, labs and EKG today as otherwise noted as well as attempting to improve her hyponatremia    Hyponatremia  Comment: noted last week on labs  -hydrochlorothiazide d'cd  -celexa being tapered, last day is today  -Na recheck today is 126  Plan: recheck Na Thursday and BMP next week     Cough  Comment: afebrile, vitals stable  -O2 sat 90-92%, denies shortness of breath, lungs CTA on exam   -COVID swabbed at facility  Plan: will check chest x-ray today given cough and lower then usually O2 sat reading  -monitor    Tachycardia  Comment: HR  today and  "regular  -previously was on metoprolol and tapered off, tolerated ok.  Per dtr, history of \"faster\" heart rhythm  -no other symptoms noted such as dizziness, lightheadedness, CP, dyspnea  Plan: EKG given falls and elevated HR today  -monitor           Electronically signed by: NADINE Rogers CNP       "

## 2021-12-13 NOTE — TELEPHONE ENCOUNTER
[facility] Nurse Triage Telephone Note    Provider: NADINE Kirk CNP  Facility: [facility] Facility Type:  AL    Caller: Radha ( [facility] home care)      Allergies:    Allergies   Allergen Reactions     Cats Other (See Comments)     Runny nose, watery eyes      Dogs Other (See Comments)     Runny nose and watery eyes      Mold Other (See Comments)     Runny nose, watery eyes         Reason for call: Pt evaluated for home care and requesting PT 1wk x1, 2xwk x1 and 1wk x4 and OT eval and treat    [facility] STANDING ORDER GIVEN:  OK for PT 1wk x1, 2xwk x1 and 1wk x4 and OT eval and treat    Provider giving Order:  NADINE Kirk CNP    Verbal Order given to: Radha Dos Santos RN

## 2021-12-14 ENCOUNTER — TRANSFERRED RECORDS (OUTPATIENT)
Dept: HEALTH INFORMATION MANAGEMENT | Facility: CLINIC | Age: 86
End: 2021-12-14

## 2021-12-14 ENCOUNTER — ASSISTED LIVING VISIT (OUTPATIENT)
Dept: GERIATRICS | Facility: CLINIC | Age: 86
End: 2021-12-14
Payer: MEDICARE

## 2021-12-14 VITALS
DIASTOLIC BLOOD PRESSURE: 93 MMHG | HEIGHT: 63 IN | WEIGHT: 129 LBS | RESPIRATION RATE: 18 BRPM | TEMPERATURE: 97.8 F | BODY MASS INDEX: 22.86 KG/M2 | HEART RATE: 94 BPM | SYSTOLIC BLOOD PRESSURE: 145 MMHG | OXYGEN SATURATION: 91 %

## 2021-12-14 DIAGNOSIS — I10 ESSENTIAL HYPERTENSION: ICD-10-CM

## 2021-12-14 DIAGNOSIS — E87.1 HYPONATREMIA: ICD-10-CM

## 2021-12-14 DIAGNOSIS — R05.9 COUGH: ICD-10-CM

## 2021-12-14 DIAGNOSIS — R41.0 CONFUSION: ICD-10-CM

## 2021-12-14 DIAGNOSIS — G30.1 LATE ONSET ALZHEIMER'S DISEASE WITHOUT BEHAVIORAL DISTURBANCE (H): Primary | ICD-10-CM

## 2021-12-14 DIAGNOSIS — M15.0 PRIMARY OSTEOARTHRITIS INVOLVING MULTIPLE JOINTS: ICD-10-CM

## 2021-12-14 DIAGNOSIS — F02.80 LATE ONSET ALZHEIMER'S DISEASE WITHOUT BEHAVIORAL DISTURBANCE (H): Primary | ICD-10-CM

## 2021-12-14 DIAGNOSIS — M62.81 GENERALIZED MUSCLE WEAKNESS: ICD-10-CM

## 2021-12-14 DIAGNOSIS — F33.1 MODERATE EPISODE OF RECURRENT MAJOR DEPRESSIVE DISORDER (H): ICD-10-CM
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
## 2021-12-14 DIAGNOSIS — R00.0 TACHYCARDIA: ICD-10-CM

## 2021-12-14 LAB
ANION GAP SERPL CALCULATED.3IONS-SCNC: 7 MMOL/L (ref 3–14)
BUN SERPL-MCNC: 14 MG/DL (ref 7–30)
CALCIUM SERPL-MCNC: 8.3 MG/DL (ref 8.5–10.1)
CHLORIDE BLD-SCNC: 94 MMOL/L (ref 94–109)
CO2 SERPL-SCNC: 25 MMOL/L (ref 20–32)
CREAT SERPL-MCNC: 0.52 MG/DL (ref 0.52–1.04)
GFR SERPL CREATININE-BSD FRML MDRD: 88 ML/MIN/1.73M2
GLUCOSE BLD-MCNC: 81 MG/DL (ref 70–99)
POTASSIUM BLD-SCNC: 3.7 MMOL/L (ref 3.4–5.3)
SODIUM SERPL-SCNC: 126 MMOL/L (ref 133–144)

## 2021-12-14 PROCEDURE — 80048 BASIC METABOLIC PNL TOTAL CA: CPT | Mod: ORL | Performed by: NURSE PRACTITIONER

## 2021-12-14 PROCEDURE — 36415 COLL VENOUS BLD VENIPUNCTURE: CPT | Mod: ORL | Performed by: NURSE PRACTITIONER

## 2021-12-14 RX ORDER — ACETAMINOPHEN 500 MG
1000 TABLET ORAL
Qty: 112 TABLET
Start: 2021-12-14 | End: 2022-01-10

## 2021-12-14 NOTE — LETTER
12/14/2021        RE: Edmundo James  [address] [place] [address]        [facility]    Chief Complaint   Patient presents with     RECHECK     CONFUSION     HPI:  Edmundo James is a 86 year old  (1935), who is being seen today for an episodic care visit at: [facility] (S) [[id number]]. Today's concern is:     Patient is an 86 year old female with  PHI significant for HTN, HLD, OA, depression, asthma, and dementia without official work-up.  She has extensive joint degeneration to her hip and primarily w/c bound.  Has been seen by [facility] for possible THIAGO and tentatively scheduled for surgery in February .     She had 4 falls last week and declined ER.  Work-up in facility with UA without acute findings, labs with hyponatremia with Na of 127.  Her Celexa is being tapered and d'cd and her hydrochlorothiazide was discontinued. She had another fall on Friday without injury.  Staff and  report she has seemed more confused in past few days     She is found in her apartment after breakfast in her wheel chair. She is a very poor historian and  provides much of the history.  He reports fall occurred when staff were with patient, her knee gave out and she was lowered to the ground.  She denies pain since the fall.  She denies CP, shortness of breath, dizziness or lightheadedness.  She has a cough and  states has been increased in past couple of days.  She has been talking non-sensical per , not knowing where she is, or making statements that make no sense.  It appears most of these instances have occurred in the evening.  She in unaware of this and thinks she is doing just fine.  Her HR is elevated today during our visit at , she is unaware of this and denies any palpitation, dizziness or CP.  Most recent med changes was addition of Buproprion for mood and family is concerned this may be making her more confused.  They have not  "noticed change in mood with taper of her Celexa.  She has labs ordered and pending for today.  Homecare therapy did their evaluation this weekend     Allergies, and PMH/PSH reviewed in [facility] today.  REVIEW OF SYSTEMS:  Limited secondary to cognitive impairment but as noted in HPI    Objective:   BP (!) 145/93   Pulse 94   Temp 97.8  F (36.6  C)   Resp 18   Ht 1.6 m (5' 3\")   Wt 58.5 kg (129 lb)   SpO2 91%   BMI 22.85 kg/m    GENERAL APPEARANCE:  Alert, in no distress, cooperative, sitting in w/c  RESP:  respiratory effort and palpation of chest normal, lungs clear to auscultation , no respiratory distress, non-productive cough  CV:  Palpation and auscultation of heart done , regular rate and rhythm but tachy at , no murmur, rub, or gallop, no edema  ABDOMEN:  no guarding or rebound, bowel sounds normal, no organomegaly  M/S:   Gait and station abnormal primarily in w/c, weakness, no edema/erythema joints   NEURO:   speech clear, no tremor, normal strenght and tone, PERRL, poor recall of events   PSYCH:  oriented X 2, very poor insight and judgement impaired, memory impaired , affect and mood flat        Recent labs in [facility] reviewed by me today.      CBC, BMP    Assessment/Plan:  (G30.1,  F02.80) Late onset Alzheimer's disease without behavioral disturbance (H)  (primary encounter diagnosis)  Comment: progressive and requiring more assistance, advanced with very little insight and judgement into abilities  -tapered off Aricept with no change noted  -discussed with [facility] care coordinator, Bessie, that I have concerns about post-op delirium   -discussed with family today that her increased confusion and impaired judgement could be r/t to progressive dementia, but as noted below, we are working-up other potential causes/contributors   Plan: staff dispense meds, assist with cares  -home care     (F33.1) Moderate episode of recurrent major depressive disorder (H)  Comment: worsened since pandemic  -was started " on Celexa with some improvement, with recent hyponatremia, Celexa is being tapered and d'cd, last does today  -Bupropion started as well in November, family concerned may be causing confusion as was most recent med change  Plan: monitor mood with taper of Celexa  -d'c Bupropion  -monitor mood     (I10) Essential hypertension  Comment: was previously on valsartan-hydrochlorothiazide combo pill, I d'cd hydrochlorothiazide with hyponatremia noted on labs  Plan: continue valsartan  -staff to monitor BP daily with recent BP change and update me next week    (W19.XXXA) Fall, initial encounter  (M62.81) Generalized muscle weakness  (M89.49) Primary osteoarthritis involving multiple joints  Comment: poor insight and judgement into situations, falls occurring due to attempting to self transfer  -denies/no reports of dizziness, lightheadedness, CP, shortness of breath  -family reports patient c/o knee pain, history of bad OA to knees   Plan: home care therapy  -on vitamin D  -increase APAP TID, voltaren apin  -care suites for increased assistance  -icy hot patches to knees       (R41.0) Confusion  Comment: reported by staff and  to seem more confused past few days  -labs pending for today  -no current s/s infection noted  -Na was 127 last week and med adjusted labs pending for today   -discussed with family that confusion could be r/t progression in dementia, it appears confusion worsens in the evening.  UA was normal last week, checking chest x-ray, labs and EKG today as otherwise noted as well as attempting to improve her hyponatremia    Hyponatremia  Comment: noted last week on labs  -hydrochlorothiazide d'cd  -celexa being tapered, last day is today  -Na recheck today is 126  Plan: recheck Na Thursday and BMP next week     Cough  Comment: afebrile, vitals stable  -O2 sat 90-92%, denies shortness of breath, lungs CTA on exam   -COVID swabbed at facility  Plan: will check chest x-ray today given cough and lower then  "usually O2 sat reading  -monitor    Tachycardia  Comment: HR  today and regular  -previously was on metoprolol and tapered off, tolerated ok.  Per dtr, history of \"faster\" heart rhythm  -no other symptoms noted such as dizziness, lightheadedness, CP, dyspnea  Plan: EKG given falls and elevated HR today  -monitor           Electronically signed by: NADINE Rogers CNP             Sincerely,        NADINE Rogers CNP    "

## 2021-12-14 NOTE — PROGRESS NOTES
Additional Facility Orders    1. Change APAP to 1,000mg po TID  2. d'c Celexa   3. Na recheck Thurs 12/16 dx: hyponatremia  4. Icy Hot 5% patch, apply 1 patch to each knee Q morning, remove after 8 hrs    NADINE Rogers CNP on 12/14/2021 at 12:47 PM

## 2021-12-15 ENCOUNTER — LAB REQUISITION (OUTPATIENT)
Dept: LAB | Facility: CLINIC | Age: 86
End: 2021-12-15
Payer: MEDICARE

## 2021-12-15 DIAGNOSIS — E87.1 HYPO-OSMOLALITY AND HYPONATREMIA: ICD-10-CM

## 2021-12-16 ENCOUNTER — TRANSFERRED RECORDS (OUTPATIENT)
Dept: HEALTH INFORMATION MANAGEMENT | Facility: CLINIC | Age: 86
End: 2021-12-16
Payer: MEDICARE

## 2021-12-16 LAB — SODIUM SERPL-SCNC: 127 MMOL/L (ref 133–144)

## 2021-12-16 PROCEDURE — 84295 ASSAY OF SERUM SODIUM: CPT | Mod: ORL | Performed by: NURSE PRACTITIONER

## 2021-12-16 PROCEDURE — 36415 COLL VENOUS BLD VENIPUNCTURE: CPT | Performed by: NURSE PRACTITIONER

## 2021-12-16 PROCEDURE — P9604 ONE-WAY ALLOW PRORATED TRIP: HCPCS | Mod: ORL | Performed by: NURSE PRACTITIONER

## 2021-12-18 DIAGNOSIS — R94.31 EKG, ABNORMAL: Primary | ICD-10-CM

## 2021-12-20 ENCOUNTER — LAB REQUISITION (OUTPATIENT)
Dept: LAB | Facility: CLINIC | Age: 86
End: 2021-12-20
Payer: MEDICARE

## 2021-12-20 DIAGNOSIS — R53.1 WEAKNESS: ICD-10-CM

## 2021-12-20 DIAGNOSIS — I10 ESSENTIAL (PRIMARY) HYPERTENSION: ICD-10-CM

## 2021-12-20 NOTE — PROGRESS NOTES
"[facility] GERIATRIC SERVICES    Chief Complaint   Patient presents with     RECHECK     f/u labs, falls     HPI:  Edmundo James is a 86 year old  (1935), who is being seen today for an episodic care visit at: [facility] ([facility]) [[id number]]. Today's concern is:     Patient is an 86 year old female with  PHI significant for HTN, HLD, OA, depression, asthma, and dementia without official work-up.  She has extensive joint degeneration to her hip and primarily w/c bound.  Has been seen by [facility] for possible THIAGO and tentatively scheduled for surgery in February .    She has been struggling with frequent falls and increased confusion.  Last week noted with hyponatremia, her hydrochlorothiazide and celexa were stopped, labs pending for today.  She also had an EKG completed given her frequent falls and noted tachycardia on exam last week (see epic abstracts) noted with some ventricular abnormalities,  Ordered a zio patch    She is seen today for follow-up on these issues.  She has advanced dementia and not able to provide much history, most of history of from patient's  and her daughter, Kandice.  Since last week no further falls, much less confusion.  No reports of chest pain, shortness of breath, dizziness or lightheadedness.  Denies n/v, diarrhea.        Allergies, and PMH/PSH reviewed in EP[facility] today.  REVIEW OF SYSTEMS:  Limited secondary to cognitive impairment but as noted in HPI    Objective:   /60   Pulse 85   Temp 97.5  F (36.4  C)   Resp 18   Ht 1.6 m (5' 2.99\")   Wt 58.7 kg (129 lb 8 oz)   BMI 22.95 kg/m    GENERAL APPEARANCE:  Alert, in no distress, cooperative, sitting in w/c  RESP:  respiratory effort and palpation of chest normal, lungs clear to auscultation , no respiratory distress, non-productive cough  CV:  Palpation and auscultation of heart done , regular rate and rhythm HR 78 apical, no murmur, rub, or gallop, no edema  ABDOMEN:  no guarding or rebound, bowel sounds " normal, no organomegaly  M/S:   Gait and station abnormal primarily in w/c, weakness, no edema/erythema joints   NEURO:   speech clear, no tremor, normal strenght and tone, PERRL, poor recall of events   PSYCH:  oriented X 2, very poor insight and judgement impaired, memory impaired , affect and mood flat     Recent Results (from the past 240 hour(s))   Basic metabolic panel    Collection Time: 12/14/21  8:20 AM   Result Value Ref Range    Sodium 126 (L) 133 - 144 mmol/L    Potassium 3.7 3.4 - 5.3 mmol/L    Chloride 94 94 - 109 mmol/L    Carbon Dioxide (CO2) 25 20 - 32 mmol/L    Anion Gap 7 3 - 14 mmol/L    Urea Nitrogen 14 7 - 30 mg/dL    Creatinine 0.52 0.52 - 1.04 mg/dL    Calcium 8.3 (L) 8.5 - 10.1 mg/dL    Glucose 81 70 - 99 mg/dL    GFR Estimate 88 >60 mL/min/1.73m2   Sodium    Collection Time: 12/16/21 12:11 PM   Result Value Ref Range    Sodium 127 (L) 133 - 144 mmol/L   CBC with platelets    Collection Time: 12/21/21  9:52 AM   Result Value Ref Range    WBC Count 3.3 (L) 4.0 - 11.0 10e3/uL    RBC Count 4.10 3.80 - 5.20 10e6/uL    Hemoglobin 12.0 11.7 - 15.7 g/dL    Hematocrit 37.2 35.0 - 47.0 %    MCV 91 78 - 100 fL    MCH 29.3 26.5 - 33.0 pg    MCHC 32.3 31.5 - 36.5 g/dL    RDW 14.8 10.0 - 15.0 %    Platelet Count 312 150 - 450 10e3/uL   Basic metabolic panel    Collection Time: 12/21/21  9:52 AM   Result Value Ref Range    Sodium 133 133 - 144 mmol/L    Potassium 4.2 3.4 - 5.3 mmol/L    Chloride 100 94 - 109 mmol/L    Carbon Dioxide (CO2) 27 20 - 32 mmol/L    Anion Gap 6 3 - 14 mmol/L    Urea Nitrogen 12 7 - 30 mg/dL    Creatinine 0.49 (L) 0.52 - 1.04 mg/dL    Calcium 8.8 8.5 - 10.1 mg/dL    Glucose 84 70 - 99 mg/dL    GFR Estimate >90 >60 mL/min/1.73m2       Assessment/Plan:  (R94.31) EKG, abnormal  (primary encounter diagnosis)  Comment: EKG with some ventricular abnormalities  -patient appears asymptomatic but poor historian  -zio patch ordered, needs to have placed still   Plan: staff update with  concerns  -zio patch     (G30.1,  F02.80) Late onset Alzheimer's disease without behavioral disturbance (H)  Comment: progressive and requiring more assistance, advanced with very little insight and judgement into abilities  -tapered off Aricept with no change noted  -have seen progression in her dementia in past several months  Plan: staff dispense meds, assist with cares  -home care OT     (R29.6) Falls frequently  (M62.81) Generalized muscle weakness  Comment: no falls reported this week  -increased weakness, now requiring EZ stand for transfers  Plan: home care therapy  -staff assist with transfers    (E87.1) Hyponatremia  Comment: noted last week  -tapered off Celexa, hydrochlorothiazide stopped  -confusion improved  Plan: Na normalized today at 133  -monitor     (F33.1) Moderate episode of recurrent major depressive disorder (H)  Comment: worsened since pandemic  -was started on Celexa with some improvement, with recent hyponatremia, Celexa tapered and dc'd  -Bupropion dcd last week  Plan: monitor mood with med changes, staff/family update with concerns    (R41.0) Confusion  Comment: significant improvement with correction of hypontremia  Plan: monitor, staff/family update with concerns    Htn  Comment: BP since d'c of hydrochlorothiazide 118/62, 100/60, 90/58, 106/69, 122/68, 114/68  -still lower then desired given age and fall risk  Plan: decrease valsarten to 80 mg po every day  -BP daily X 1 week and update provider   -d'c KCL given off hydrochlorothiazide  -BMP next week     Electronically signed by: NADINE Rogers CNP

## 2021-12-21 ENCOUNTER — ASSISTED LIVING VISIT (OUTPATIENT)
Dept: GERIATRICS | Facility: CLINIC | Age: 86
End: 2021-12-21
Payer: MEDICARE

## 2021-12-21 ENCOUNTER — TRANSFERRED RECORDS (OUTPATIENT)
Dept: HEALTH INFORMATION MANAGEMENT | Facility: CLINIC | Age: 86
End: 2021-12-21

## 2021-12-21 VITALS
HEART RATE: 85 BPM | HEIGHT: 63 IN | SYSTOLIC BLOOD PRESSURE: 100 MMHG | RESPIRATION RATE: 18 BRPM | TEMPERATURE: 97.5 F | BODY MASS INDEX: 22.95 KG/M2 | DIASTOLIC BLOOD PRESSURE: 60 MMHG | WEIGHT: 129.5 LBS

## 2021-12-21 DIAGNOSIS — R94.31 EKG, ABNORMAL: Primary | ICD-10-CM

## 2021-12-21 DIAGNOSIS — F33.1 MODERATE EPISODE OF RECURRENT MAJOR DEPRESSIVE DISORDER (H): ICD-10-CM

## 2021-12-21 DIAGNOSIS — F02.80 LATE ONSET ALZHEIMER'S DISEASE WITHOUT BEHAVIORAL DISTURBANCE (H): ICD-10-CM

## 2021-12-21 DIAGNOSIS — R29.6 FALLS FREQUENTLY: ICD-10-CM

## 2021-12-21 DIAGNOSIS — I10 HYPERTENSION, UNSPECIFIED TYPE: ICD-10-CM

## 2021-12-21 DIAGNOSIS — E87.1 HYPONATREMIA: ICD-10-CM

## 2021-12-21 DIAGNOSIS — G30.1 LATE ONSET ALZHEIMER'S DISEASE WITHOUT BEHAVIORAL DISTURBANCE (H): ICD-10-CM

## 2021-12-21 DIAGNOSIS — M62.81 GENERALIZED MUSCLE WEAKNESS: ICD-10-CM

## 2021-12-21 DIAGNOSIS — R41.0 CONFUSION: ICD-10-CM

## 2021-12-21 LAB
ANION GAP SERPL CALCULATED.3IONS-SCNC: 6 MMOL/L (ref 3–14)
BUN SERPL-MCNC: 12 MG/DL (ref 7–30)
CALCIUM SERPL-MCNC: 8.8 MG/DL (ref 8.5–10.1)
CHLORIDE BLD-SCNC: 100 MMOL/L (ref 94–109)
CO2 SERPL-SCNC: 27 MMOL/L (ref 20–32)
CREAT SERPL-MCNC: 0.49 MG/DL (ref 0.52–1.04)
ERYTHROCYTE [DISTWIDTH] IN BLOOD BY AUTOMATED COUNT: 14.8 % (ref 10–15)
GFR SERPL CREATININE-BSD FRML MDRD: >90 ML/MIN/1.73M2
GLUCOSE BLD-MCNC: 84 MG/DL (ref 70–99)
HCT VFR BLD AUTO: 37.2 % (ref 35–47)
HGB BLD-MCNC: 12 G/DL (ref 11.7–15.7)
MCH RBC QN AUTO: 29.3 PG (ref 26.5–33)
MCHC RBC AUTO-ENTMCNC: 32.3 G/DL (ref 31.5–36.5)
MCV RBC AUTO: 91 FL (ref 78–100)
PLATELET # BLD AUTO: 312 10E3/UL (ref 150–450)
POTASSIUM BLD-SCNC: 4.2 MMOL/L (ref 3.4–5.3)
RBC # BLD AUTO: 4.1 10E6/UL (ref 3.8–5.2)
SODIUM SERPL-SCNC: 133 MMOL/L (ref 133–144)
WBC # BLD AUTO: 3.3 10E3/UL (ref 4–11)

## 2021-12-21 PROCEDURE — 85027 COMPLETE CBC AUTOMATED: CPT | Mod: ORL | Performed by: NURSE PRACTITIONER

## 2021-12-21 PROCEDURE — 80048 BASIC METABOLIC PNL TOTAL CA: CPT | Mod: ORL | Performed by: NURSE PRACTITIONER

## 2021-12-21 RX ORDER — VALSARTAN 80 MG/1
80 TABLET ORAL DAILY
Qty: 30 TABLET | Refills: 1 | Status: SHIPPED | OUTPATIENT
Start: 2021-12-21 | End: 2022-01-10

## 2021-12-21 ASSESSMENT — MIFFLIN-ST. JEOR: SCORE: 1006.41

## 2021-12-21 NOTE — LETTER
"    12/21/2021        RE: Edmundo James  [address] [place] [address]        [facility]    Chief Complaint   Patient presents with     RECHECK     f/u labs, falls     HPI:  Edmundo James is a 86 year old  (1935), who is being seen today for an episodic care visit at: [facility] ([facility]) [[id number]]. Today's concern is:     Patient is an 86 year old female with  PHI significant for HTN, HLD, OA, depression, asthma, and dementia without official work-up.  She has extensive joint degeneration to her hip and primarily w/c bound.  Has been seen by [facility] for possible THIAGO and tentatively scheduled for surgery in February .    She has been struggling with frequent falls and increased confusion.  Last week noted with hyponatremia, her hydrochlorothiazide and celexa were stopped, labs pending for today.  She also had an EKG completed given her frequent falls and noted tachycardia on exam last week (see epic abstracts) noted with some ventricular abnormalities,  Ordered a zio patch    She is seen today for follow-up on these issues.  She has advanced dementia and not able to provide much history, most of history of from patient's  and her daughter, Kandice.  Since last week no further falls, much less confusion.  No reports of chest pain, shortness of breath, dizziness or lightheadedness.  Denies n/v, diarrhea.        Allergies, and PMH/PSH reviewed in [facility] today.  REVIEW OF SYSTEMS:  Limited secondary to cognitive impairment but as noted in HPI    Objective:   /60   Pulse 85   Temp 97.5  F (36.4  C)   Resp 18   Ht 1.6 m (5' 2.99\")   Wt 58.7 kg (129 lb 8 oz)   BMI 22.95 kg/m    GENERAL APPEARANCE:  Alert, in no distress, cooperative, sitting in w/c  RESP:  respiratory effort and palpation of chest normal, lungs clear to auscultation , no respiratory distress, non-productive cough  CV:  Palpation and auscultation of heart done , regular rate and rhythm HR " 78 apical, no murmur, rub, or gallop, no edema  ABDOMEN:  no guarding or rebound, bowel sounds normal, no organomegaly  M/S:   Gait and station abnormal primarily in w/c, weakness, no edema/erythema joints   NEURO:   speech clear, no tremor, normal strenght and tone, PERRL, poor recall of events   PSYCH:  oriented X 2, very poor insight and judgement impaired, memory impaired , affect and mood flat     Recent Results (from the past 240 hour(s))   Basic metabolic panel    Collection Time: 12/14/21  8:20 AM   Result Value Ref Range    Sodium 126 (L) 133 - 144 mmol/L    Potassium 3.7 3.4 - 5.3 mmol/L    Chloride 94 94 - 109 mmol/L    Carbon Dioxide (CO2) 25 20 - 32 mmol/L    Anion Gap 7 3 - 14 mmol/L    Urea Nitrogen 14 7 - 30 mg/dL    Creatinine 0.52 0.52 - 1.04 mg/dL    Calcium 8.3 (L) 8.5 - 10.1 mg/dL    Glucose 81 70 - 99 mg/dL    GFR Estimate 88 >60 mL/min/1.73m2   Sodium    Collection Time: 12/16/21 12:11 PM   Result Value Ref Range    Sodium 127 (L) 133 - 144 mmol/L   CBC with platelets    Collection Time: 12/21/21  9:52 AM   Result Value Ref Range    WBC Count 3.3 (L) 4.0 - 11.0 10e3/uL    RBC Count 4.10 3.80 - 5.20 10e6/uL    Hemoglobin 12.0 11.7 - 15.7 g/dL    Hematocrit 37.2 35.0 - 47.0 %    MCV 91 78 - 100 fL    MCH 29.3 26.5 - 33.0 pg    MCHC 32.3 31.5 - 36.5 g/dL    RDW 14.8 10.0 - 15.0 %    Platelet Count 312 150 - 450 10e3/uL   Basic metabolic panel    Collection Time: 12/21/21  9:52 AM   Result Value Ref Range    Sodium 133 133 - 144 mmol/L    Potassium 4.2 3.4 - 5.3 mmol/L    Chloride 100 94 - 109 mmol/L    Carbon Dioxide (CO2) 27 20 - 32 mmol/L    Anion Gap 6 3 - 14 mmol/L    Urea Nitrogen 12 7 - 30 mg/dL    Creatinine 0.49 (L) 0.52 - 1.04 mg/dL    Calcium 8.8 8.5 - 10.1 mg/dL    Glucose 84 70 - 99 mg/dL    GFR Estimate >90 >60 mL/min/1.73m2       Assessment/Plan:  (R94.31) EKG, abnormal  (primary encounter diagnosis)  Comment: EKG with some ventricular abnormalities  -patient appears asymptomatic  but poor historian  -zio patch ordered, needs to have placed still   Plan: staff update with concerns  -zio patch     (G30.1,  F02.80) Late onset Alzheimer's disease without behavioral disturbance (H)  Comment: progressive and requiring more assistance, advanced with very little insight and judgement into abilities  -tapered off Aricept with no change noted  -have seen progression in her dementia in past several months  Plan: staff dispense meds, assist with cares  -home care OT     (R29.6) Falls frequently  (M62.81) Generalized muscle weakness  Comment: no falls reported this week  -increased weakness, now requiring EZ stand for transfers  Plan: home care therapy  -staff assist with transfers    (E87.1) Hyponatremia  Comment: noted last week  -tapered off Celexa, hydrochlorothiazide stopped  -confusion improved  Plan: Na normalized today at 133  -monitor     (F33.1) Moderate episode of recurrent major depressive disorder (H)  Comment: worsened since pandemic  -was started on Celexa with some improvement, with recent hyponatremia, Celexa tapered and dc'd  -Bupropion dcd last week  Plan: monitor mood with med changes, staff/family update with concerns    (R41.0) Confusion  Comment: significant improvement with correction of hypontremia  Plan: monitor, staff/family update with concerns    Htn  Comment: BP since d'c of hydrochlorothiazide 118/62, 100/60, 90/58, 106/69, 122/68, 114/68  -still lower then desired given age and fall risk  Plan: decrease valsarten to 80 mg po every day  -BP daily X 1 week and update provider     Electronically signed by: NADINE Rogers CNP         [facility]IATR[facility]    Chief Complaint   Patient presents with     RECHECK     f/u labs, falls     HPI:  Edmundo James is a 86 year old  (1935), who is being seen today for an episodic care visit at: [facility] (Counts include 234 beds at the [facility]) [[id number]]. Today's concern is:     Patient is an 86 year old female with  PHI  "significant for HTN, HLD, OA, depression, asthma, and dementia without official work-up.  She has extensive joint degeneration to her hip and primarily w/c bound.  Has been seen by [facility] for possible THIAGO and tentatively scheduled for surgery in February .    She has been struggling with frequent falls and increased confusion.  Last week noted with hyponatremia, her hydrochlorothiazide and celexa were stopped, labs pending for today.  She also had an EKG completed given her frequent falls and noted tachycardia on exam last week (see epic abstracts) noted with some ventricular abnormalities,  Ordered a zio patch    She is seen today for follow-up on these issues.  She has advanced dementia and not able to provide much history, most of history of from patient's  and her daughter, Kandice.  Since last week no further falls, much less confusion.  No reports of chest pain, shortness of breath, dizziness or lightheadedness.  Denies n/v, diarrhea.        Allergies, and PMH/PSH reviewed in EPIC today.  REVIEW OF SYSTEMS:  Limited secondary to cognitive impairment but as noted in HPI    Objective:   /60   Pulse 85   Temp 97.5  F (36.4  C)   Resp 18   Ht 1.6 m (5' 2.99\")   Wt 58.7 kg (129 lb 8 oz)   BMI 22.95 kg/m    GENERAL APPEARANCE:  Alert, in no distress, cooperative, sitting in w/c  RESP:  respiratory effort and palpation of chest normal, lungs clear to auscultation , no respiratory distress, non-productive cough  CV:  Palpation and auscultation of heart done , regular rate and rhythm HR 78 apical, no murmur, rub, or gallop, no edema  ABDOMEN:  no guarding or rebound, bowel sounds normal, no organomegaly  M/S:   Gait and station abnormal primarily in w/c, weakness, no edema/erythema joints   NEURO:   speech clear, no tremor, normal strenght and tone, PERRL, poor recall of events   PSYCH:  oriented X 2, very poor insight and judgement impaired, memory impaired , affect and mood flat     Recent Results (from " the past 240 hour(s))   Basic metabolic panel    Collection Time: 12/14/21  8:20 AM   Result Value Ref Range    Sodium 126 (L) 133 - 144 mmol/L    Potassium 3.7 3.4 - 5.3 mmol/L    Chloride 94 94 - 109 mmol/L    Carbon Dioxide (CO2) 25 20 - 32 mmol/L    Anion Gap 7 3 - 14 mmol/L    Urea Nitrogen 14 7 - 30 mg/dL    Creatinine 0.52 0.52 - 1.04 mg/dL    Calcium 8.3 (L) 8.5 - 10.1 mg/dL    Glucose 81 70 - 99 mg/dL    GFR Estimate 88 >60 mL/min/1.73m2   Sodium    Collection Time: 12/16/21 12:11 PM   Result Value Ref Range    Sodium 127 (L) 133 - 144 mmol/L   CBC with platelets    Collection Time: 12/21/21  9:52 AM   Result Value Ref Range    WBC Count 3.3 (L) 4.0 - 11.0 10e3/uL    RBC Count 4.10 3.80 - 5.20 10e6/uL    Hemoglobin 12.0 11.7 - 15.7 g/dL    Hematocrit 37.2 35.0 - 47.0 %    MCV 91 78 - 100 fL    MCH 29.3 26.5 - 33.0 pg    MCHC 32.3 31.5 - 36.5 g/dL    RDW 14.8 10.0 - 15.0 %    Platelet Count 312 150 - 450 10e3/uL   Basic metabolic panel    Collection Time: 12/21/21  9:52 AM   Result Value Ref Range    Sodium 133 133 - 144 mmol/L    Potassium 4.2 3.4 - 5.3 mmol/L    Chloride 100 94 - 109 mmol/L    Carbon Dioxide (CO2) 27 20 - 32 mmol/L    Anion Gap 6 3 - 14 mmol/L    Urea Nitrogen 12 7 - 30 mg/dL    Creatinine 0.49 (L) 0.52 - 1.04 mg/dL    Calcium 8.8 8.5 - 10.1 mg/dL    Glucose 84 70 - 99 mg/dL    GFR Estimate >90 >60 mL/min/1.73m2       Assessment/Plan:  (R94.31) EKG, abnormal  (primary encounter diagnosis)  Comment: EKG with some ventricular abnormalities  -patient appears asymptomatic but poor historian  -zio patch ordered, needs to have placed still   Plan: staff update with concerns  -zio patch     (G30.1,  F02.80) Late onset Alzheimer's disease without behavioral disturbance (H)  Comment: progressive and requiring more assistance, advanced with very little insight and judgement into abilities  -tapered off Aricept with no change noted  -have seen progression in her dementia in past several  months  Plan: staff dispense meds, assist with cares  -home care OT     (R29.6) Falls frequently  (M62.81) Generalized muscle weakness  Comment: no falls reported this week  -increased weakness, now requiring EZ stand for transfers  Plan: home care therapy  -staff assist with transfers    (E87.1) Hyponatremia  Comment: noted last week  -tapered off Celexa, hydrochlorothiazide stopped  -confusion improved  Plan: Na normalized today at 133  -monitor     (F33.1) Moderate episode of recurrent major depressive disorder (H)  Comment: worsened since pandemic  -was started on Celexa with some improvement, with recent hyponatremia, Celexa tapered and dc'd  -Bupropion dcd last week  Plan: monitor mood with med changes, staff/family update with concerns    (R41.0) Confusion  Comment: significant improvement with correction of hypontremia  Plan: monitor, staff/family update with concerns    Htn  Comment: BP since d'c of hydrochlorothiazide 118/62, 100/60, 90/58, 106/69, 122/68, 114/68  -still lower then desired given age and fall risk  Plan: decrease valsarten to 80 mg po every day  -BP daily X 1 week and update provider   -d'c KCL given off hydrochlorothiazide  -BMP next week     Electronically signed by: NADINE Rogers CNP             Sincerely,        NADINE Rogers CNP

## 2021-12-21 NOTE — PROGRESS NOTES
Facility Orders    1. Decrease Valsarten to 80mg po every day  2. BP daily X 1 week and update NP next week  3. d'c KCL  4. BMP next Tuesday dx htn  5. I placed orders for patient to have zio patch placed.  Please let me know when she gets placed and assist with mailing device back when reading complete    Dolores Babb, NADINE CNP on 12/21/2021 at 1:19 PM

## 2021-12-27 ENCOUNTER — LAB REQUISITION (OUTPATIENT)
Dept: LAB | Facility: CLINIC | Age: 86
End: 2021-12-27
Payer: MEDICARE

## 2021-12-27 DIAGNOSIS — I10 ESSENTIAL (PRIMARY) HYPERTENSION: ICD-10-CM

## 2021-12-28 DIAGNOSIS — F02.80 LATE ONSET ALZHEIMER'S DISEASE WITHOUT BEHAVIORAL DISTURBANCE (H): Primary | ICD-10-CM

## 2021-12-28 DIAGNOSIS — M15.0 PRIMARY OSTEOARTHRITIS INVOLVING MULTIPLE JOINTS: ICD-10-CM

## 2021-12-28 DIAGNOSIS — R29.6 FALLS FREQUENTLY: ICD-10-CM

## 2021-12-28 DIAGNOSIS — M62.81 GENERALIZED MUSCLE WEAKNESS: ICD-10-CM

## 2021-12-28 DIAGNOSIS — G30.1 LATE ONSET ALZHEIMER'S DISEASE WITHOUT BEHAVIORAL DISTURBANCE (H): Primary | ICD-10-CM

## 2021-12-28 DIAGNOSIS — M25.551 HIP PAIN, RIGHT: ICD-10-CM

## 2021-12-28 LAB
ANION GAP SERPL CALCULATED.3IONS-SCNC: 6 MMOL/L (ref 3–14)
BUN SERPL-MCNC: 15 MG/DL (ref 7–30)
CALCIUM SERPL-MCNC: 8.7 MG/DL (ref 8.5–10.1)
CHLORIDE BLD-SCNC: 104 MMOL/L (ref 94–109)
CO2 SERPL-SCNC: 27 MMOL/L (ref 20–32)
CREAT SERPL-MCNC: 0.53 MG/DL (ref 0.52–1.04)
GFR SERPL CREATININE-BSD FRML MDRD: >90 ML/MIN/1.73M2
GLUCOSE BLD-MCNC: 111 MG/DL (ref 70–99)
POTASSIUM BLD-SCNC: 3.4 MMOL/L (ref 3.4–5.3)
SODIUM SERPL-SCNC: 137 MMOL/L (ref 133–144)

## 2021-12-28 PROCEDURE — 80048 BASIC METABOLIC PNL TOTAL CA: CPT | Mod: ORL | Performed by: NURSE PRACTITIONER

## 2021-12-29 NOTE — PROGRESS NOTES
"    [facility] DME Order/Prescription    Date: December 28, 2021  Patient: Edmundo James, 1935    DME ordered:  Hospital bed: fully electronic with pair half side rails  Needing above DME for \"99\" length of need for the following medical necessity:    (G30.1,  F02.80) Late onset Alzheimer's disease without behavioral disturbance (H)  (primary encounter diagnosis)  (R29.6) Falls frequently  (M62.81) Generalized muscle weakness  (M89.49) Primary osteoarthritis involving multiple joints  (M25.551) Hip pain, right    Patient Height 5'3''  Patient Weight 129lbs  F2F date 12/7/21    Facility staff/TC to contact DME company to get their order form for provider to fill out    ELECTRONICALLY SIGNED BY ADRIANE CERTIFIED PROVIDER:  NADINE Rogers CNP   NPI: [id number]  [facility]  [facility], [address]  [place] [address]        [facility] DME Order/Prescription    Date: December 28, 2021  Patient: Edmundo James, 1935    DME ordered:    Wheelchair Documentation  Size: 18''X 18'' lightweight due to inability to propel standard wc, but can propel light weight  michael height to allow Pt to self propel using B LE  Corresponding cushion: Yes:   Standard foot rests: Yes  Elevating leg rests: No  Arm rests: Yes: desk top length armrests      Reason for Type of Wheelchair  Patient weight: 129lbs  Patient height: 5'3''  Light Weight Wheelchair: Patient is unable to self-propel a standard wheelchair in the home but can self propel a light weight wheelchair.    Needing above DME for \"99\" length of need for the following medical necessity:    (G30.1,  F02.80) Late onset Alzheimer's disease without behavioral disturbance (H)  (primary encounter diagnosis)  (R29.6) Falls frequently  (M62.81) Generalized muscle weakness  (M89.49) Primary osteoarthritis involving multiple joints  (M25.551) Hip pain, right        Facility staff/TC to contact DME company to get " their order form for provider to fill out    ELECTRONICALLY SIGNED BY MARIANELAOS CERTIFIED PROVIDER:  NADINE Rogers CNP   NPI: [id number]  [facility]  [facility], [address]  [place] [address]

## 2022-01-03 ENCOUNTER — LAB REQUISITION (OUTPATIENT)
Dept: LAB | Facility: CLINIC | Age: 87
End: 2022-01-03
Payer: MEDICARE

## 2022-01-03 DIAGNOSIS — D64.9 ANEMIA, UNSPECIFIED: ICD-10-CM

## 2022-01-05 ENCOUNTER — LAB REQUISITION (OUTPATIENT)
Dept: LAB | Facility: CLINIC | Age: 87
End: 2022-01-05
Payer: MEDICARE

## 2022-01-05 DIAGNOSIS — I10 ESSENTIAL (PRIMARY) HYPERTENSION: ICD-10-CM

## 2022-01-05 DIAGNOSIS — R53.1 WEAKNESS: ICD-10-CM

## 2022-01-06 ENCOUNTER — TRANSFERRED RECORDS (OUTPATIENT)
Dept: HEALTH INFORMATION MANAGEMENT | Facility: CLINIC | Age: 87
End: 2022-01-06

## 2022-01-06 LAB
ERYTHROCYTE [DISTWIDTH] IN BLOOD BY AUTOMATED COUNT: 14.1 % (ref 10–15)
FERRITIN SERPL-MCNC: 65 NG/ML (ref 8–252)
FOLATE SERPL-MCNC: 44 NG/ML
HCT VFR BLD AUTO: 39.3 % (ref 35–47)
HGB BLD-MCNC: 12 G/DL (ref 11.7–15.7)
MCH RBC QN AUTO: 28.8 PG (ref 26.5–33)
MCHC RBC AUTO-ENTMCNC: 30.5 G/DL (ref 31.5–36.5)
MCV RBC AUTO: 95 FL (ref 78–100)
PLATELET # BLD AUTO: 277 10E3/UL (ref 150–450)
RBC # BLD AUTO: 4.16 10E6/UL (ref 3.8–5.2)
VIT B12 SERPL-MCNC: 811 PG/ML (ref 193–986)
WBC # BLD AUTO: 4.4 10E3/UL (ref 4–11)

## 2022-01-06 PROCEDURE — 82746 ASSAY OF FOLIC ACID SERUM: CPT | Mod: ORL | Performed by: NURSE PRACTITIONER

## 2022-01-06 PROCEDURE — 82728 ASSAY OF FERRITIN: CPT | Mod: ORL | Performed by: NURSE PRACTITIONER

## 2022-01-06 PROCEDURE — 36415 COLL VENOUS BLD VENIPUNCTURE: CPT | Mod: ORL | Performed by: NURSE PRACTITIONER

## 2022-01-06 PROCEDURE — 82607 VITAMIN B-12: CPT | Mod: ORL | Performed by: NURSE PRACTITIONER

## 2022-01-06 PROCEDURE — P9604 ONE-WAY ALLOW PRORATED TRIP: HCPCS | Mod: ORL | Performed by: NURSE PRACTITIONER

## 2022-01-06 PROCEDURE — 85027 COMPLETE CBC AUTOMATED: CPT | Mod: ORL | Performed by: NURSE PRACTITIONER

## 2022-01-11 ENCOUNTER — ASSISTED LIVING VISIT (OUTPATIENT)
Dept: GERIATRICS | Facility: CLINIC | Age: 87
End: 2022-01-11
Payer: MEDICARE

## 2022-01-11 ENCOUNTER — TRANSFERRED RECORDS (OUTPATIENT)
Dept: HEALTH INFORMATION MANAGEMENT | Facility: CLINIC | Age: 87
End: 2022-01-11

## 2022-01-11 VITALS
DIASTOLIC BLOOD PRESSURE: 70 MMHG | TEMPERATURE: 97.6 F | BODY MASS INDEX: 22.86 KG/M2 | WEIGHT: 129 LBS | HEIGHT: 63 IN | SYSTOLIC BLOOD PRESSURE: 122 MMHG

## 2022-01-11 DIAGNOSIS — L29.9 ITCHING: ICD-10-CM

## 2022-01-11 DIAGNOSIS — G89.29 CHRONIC PAIN OF RIGHT KNEE: ICD-10-CM

## 2022-01-11 DIAGNOSIS — H61.21 IMPACTED CERUMEN OF RIGHT EAR: ICD-10-CM

## 2022-01-11 DIAGNOSIS — G89.29 CHRONIC PAIN OF LEFT KNEE: ICD-10-CM

## 2022-01-11 DIAGNOSIS — M25.562 CHRONIC PAIN OF LEFT KNEE: ICD-10-CM

## 2022-01-11 DIAGNOSIS — F41.9 ANXIETY: ICD-10-CM

## 2022-01-11 DIAGNOSIS — F33.1 MODERATE EPISODE OF RECURRENT MAJOR DEPRESSIVE DISORDER (H): ICD-10-CM

## 2022-01-11 DIAGNOSIS — F02.80 LATE ONSET ALZHEIMER'S DISEASE WITHOUT BEHAVIORAL DISTURBANCE (H): Primary | ICD-10-CM

## 2022-01-11 DIAGNOSIS — D64.9 ANEMIA, UNSPECIFIED TYPE: ICD-10-CM

## 2022-01-11 DIAGNOSIS — M25.561 CHRONIC PAIN OF RIGHT KNEE: ICD-10-CM

## 2022-01-11 DIAGNOSIS — G30.1 LATE ONSET ALZHEIMER'S DISEASE WITHOUT BEHAVIORAL DISTURBANCE (H): Primary | ICD-10-CM

## 2022-01-11 DIAGNOSIS — R94.31 EKG, ABNORMAL: ICD-10-CM

## 2022-01-11 RX ORDER — CETIRIZINE HYDROCHLORIDE 5 MG/1
5 TABLET ORAL DAILY
Qty: 30 TABLET | Refills: 3 | Status: SHIPPED | OUTPATIENT
Start: 2022-01-11 | End: 2022-02-22

## 2022-01-11 RX ORDER — LORAZEPAM 0.5 MG/1
0.25 TABLET ORAL WEEKLY
Qty: 2 TABLET | Refills: 0 | Status: SHIPPED | OUTPATIENT
Start: 2022-01-11 | End: 2022-02-08

## 2022-01-11 ASSESSMENT — MIFFLIN-ST. JEOR: SCORE: 1004.14

## 2022-01-11 NOTE — PROGRESS NOTES
[facility]    Chief Complaint   Patient presents with     RECHECK     HPI:  Edmundo James is a 86 year old  (1935), who is being seen today for an episodic care visit at: [facility] ([facility]) [[id number]]. Today's concern is:     Patient is an 86 year old female with  PHI significant for HTN, HLD, OA, depression, asthma, and dementia without official work-up.  She has extensive joint degeneration to her hips and knees and primarily w/c bound now.  Has been seen by [facility] for possible THIAGO and tentatively scheduled for surgery in February .    She struggled with increased confusion and falls in December, noted with hyponatremia and her Celexa was discontinued with normalization of Na, she is working with home care therapy and has not had any falls reported in the past several weeks    She is seen today at family's request due to itching, difficulty hearing, knee pain and difficulty with bathing.  Patient has been scratching everywhere and feeling very itchy all throughout the day.  Denies/no reports of changes in detergent, food intake, lotions, medications, ect.  No food sensitivities to her knowledge.  Daughter reports significant allergies in her history to cats and air irritants; generally results in congestion and watery eyes.  Due to her advancing dementia, she has been refusing bathes and showers as they are very distressing to her, causes a lot of anxiety.  Staff and family have tried several non-pharmacological interventions without success.  She has been getting sponge bathes only.  Discussed trying Ativan 30 minutes prior to bath and discussed potential negative s/e such as falls, increased confusion, ect.  Family aware but would like to try.  Lack of hygiene may be contributing to itching    Continues to report significant bilateral knee pain.  She is on APAP, icy hot and Voltaren, wondering if she could try cortisone injection     Following with [facility] for potential THIAGO,  "Hgb needs to optimal.  She was placed on folic acid and B12, she had lab work f/u last week       Recent reports of difficulty hearing out of her right ear, denies ear pain, fever         Allergies, and PMH/PSH reviewed in [facility] today.  REVIEW OF SYSTEMS:  Limited secondary to cognitive impairment but as noted in HPI    Objective:   /70   Temp 97.6  F (36.4  C)   Ht 1.6 m (5' 2.99\")   Wt 58.5 kg (129 lb)   BMI 22.86 kg/m    GENERAL APPEARANCE:  Alert, in no distress, cooperative, sitting in w/c  Ears: impacted Cerumen to right ear   RESP:  respiratory effort and palpation of chest normal, lungs clear to auscultation , no respiratory distress, non-productive cough  CV:  Palpation and auscultation of heart done , regular rate and rhythm HR 74 apical, no murmur, rub, or gallop, trace BLLE edema  ABDOMEN:  no guarding or rebound, bowel sounds normal, no organomegaly  M/S:   Gait and station abnormal primarily in w/c, weakness, impaired ROM hips and knees due to pain, discomfort to palpation or bilateral knees   NEURO:   speech clear, no tremormal strenghth and tone, PERRL, poor recall of events   Skin: no rashes, noteable scratch marks to chest back, arms and legs, no s/s infection   PSYCH:  oriented X 2, very poor insight and judgement impaired, memory impaired , affect and mood flat     Recent Results (from the past 240 hour(s))   CBC with platelets    Collection Time: 01/06/22  8:05 AM   Result Value Ref Range    WBC Count 4.4 4.0 - 11.0 10e3/uL    RBC Count 4.16 3.80 - 5.20 10e6/uL    Hemoglobin 12.0 11.7 - 15.7 g/dL    Hematocrit 39.3 35.0 - 47.0 %    MCV 95 78 - 100 fL    MCH 28.8 26.5 - 33.0 pg    MCHC 30.5 (L) 31.5 - 36.5 g/dL    RDW 14.1 10.0 - 15.0 %    Platelet Count 277 150 - 450 10e3/uL   Vitamin B12    Collection Time: 01/06/22  8:05 AM   Result Value Ref Range    Vitamin B12 811 193 - 986 pg/mL   Ferritin    Collection Time: 01/06/22  8:05 AM   Result Value Ref Range    Ferritin 65 8 - 252 ng/mL "   Folate    Collection Time: 01/06/22  8:05 AM   Result Value Ref Range    Folic Acid 44.0 >=5.4 ng/mL       Assessment/Plan:  (G30.1,  F02.80) Late onset Alzheimer's disease without behavioral disturbance (H)  (primary encounter diagnosis)  Comment: fairly advanced, anxious features, refusing personal hygiene care, bathes and showers, becoming problematic  -non-pharmacological methods have not worked   Plan: discussed trying Ativan once weekly prior to bathes, 0.25mg, staff to update if effective  -staff dispense meds, assist with cares  -supportive family    (F33.1) Moderate episode of recurrent major depressive disorder (H)  Comment: tapered off Celexa and Wellbutrin due to hyponatremia, confusion: did not see much change in mood  -Na normalized  Plan: monitor mood, staff/family to update with concerns    (H61.21) Impacted cerumen of right ear  Comment: c/o difficulty hearing to right ear, denies pain, cerumen noted  Plan: carbamide peroxide (DEBROX) 6.5 % otic solution        Irrigate ears    (L29.9) Itching  Comment: reporting extreme itchiness all over and all day long, scratching all over body and causing break in skin  -no changes in foods, detergents, lotions, medications, ect  -Lipitor was held X 1 week with no improvement  -history of allergies to air irritants per daughter  Plan: cetirizine (ZYRTEC) 5 MG tablet        -aquaphor BID to prevent dry skin  -needs to have bath or shower, has been many months since she has allowed staff to give a bath and could be contributing   -monitor for s/s infection     (F41.9) Anxiety  Comment: extreme anxiety with bathes/showers, has been refusing  -has not had good response to non-pharmacological methods  -discussed potential negative s/e of ativan with family: falls, confusion, ect, family aware and agrees to try as feels risk outweigh benefit  Plan: ativan 0.25mg 30 minutes prior to bath once weekly  -staff update with effecitveness    (M25.562,  G89.29) Chronic  pain of left knee  (M25.561,  G89.29) Chronic pain of right knee  Comment: chronic pain to knees, non-ambulatory  Plan: icy hot, APAP, voltaren  -AP/lateral x-ray bilateral knees  -onsite ortho contacted and will review x-rays and determine if appropriate to try cortisone injections     (D64.9) Anemia, unspecified type  Comment: hgb needed to be optimal before would be considered for hip replacement  -started on folic acid and B12  -no acute s/s bleeding  Plan:   Lab Results   Component Value Date    HGB 12.0 01/06/2022     -see other lab levels above    (R94.31) EKG, abnormal  Comment: EKG with some ventricular abnormalities in the past  -patient appears asymptomatic but poor historian  -zio patch ordered, needs to have placed still, discussed with dtr today and she reports clinic has contacted her and she needs to call them back  Plan: staff update with concerns  -zio patch      Electronically signed by: NADINE Rogers CNP

## 2022-01-11 NOTE — LETTER
1/11/2022        RE: Edmundo James  [address] [place] [address]        [facility]    Chief Complaint   Patient presents with     RECHECK     HPI:  Edmundo James is a 86 year old  (1935), who is being seen today for an episodic care visit at: [facility] ([facility]) [[id number]]. Today's concern is:     Patient is an 86 year old female with  PHI significant for HTN, HLD, OA, depression, asthma, and dementia without official work-up.  She has extensive joint degeneration to her hips and knees and primarily w/c bound now.  Has been seen by [facility] for possible THIAGO and tentatively scheduled for surgery in February .    She struggled with increased confusion and falls in December, noted with hyponatremia and her Celexa was discontinued with normalization of Na, she is working with home care therapy and has not had any falls reported in the past several weeks    She is seen today at family's request due to itching, difficulty hearing, knee pain and difficulty with bathing.  Patient has been scratching everywhere and feeling very itchy all throughout the day.  Denies/no reports of changes in detergent, food intake, lotions, medications, ect.  No food sensitivities to her knowledge.  Daughter reports significant allergies in her history to cats and air irritants; generally results in congestion and watery eyes.  Due to her advancing dementia, she has been refusing bathes and showers as they are very distressing to her, causes a lot of anxiety.  Staff and family have tried several non-pharmacological interventions without success.  She has been getting sponge bathes only.  Discussed trying Ativan 30 minutes prior to bath and discussed potential negative s/e such as falls, increased confusion, ect.  Family aware but would like to try.  Lack of hygiene may be contributing to itching    Continues to report significant bilateral knee pain.  She is on APAP, icy hot and  "Voltaren, wondering if she could try cortisone injection     Following with [facility] for potential THIAGO, Hgb needs to optimal.  She was placed on folic acid and B12, she had lab work f/u last week       Recent reports of difficulty hearing out of her right ear, denies ear pain, fever         Allergies, and PMH/PSH reviewed in [facility] today.  REVIEW OF SYSTEMS:  Limited secondary to cognitive impairment but as noted in HPI    Objective:   /70   Temp 97.6  F (36.4  C)   Ht 1.6 m (5' 2.99\")   Wt 58.5 kg (129 lb)   BMI 22.86 kg/m    GENERAL APPEARANCE:  Alert, in no distress, cooperative, sitting in w/c  Ears: impacted Cerumen to right ear   RESP:  respiratory effort and palpation of chest normal, lungs clear to auscultation , no respiratory distress, non-productive cough  CV:  Palpation and auscultation of heart done , regular rate and rhythm HR 74 apical, no murmur, rub, or gallop, trace BLLE edema  ABDOMEN:  no guarding or rebound, bowel sounds normal, no organomegaly  M/S:   Gait and station abnormal primarily in w/c, weakness, impaired ROM hips and knees due to pain, discomfort to palpation or bilateral knees   NEURO:   speech clear, no tremormal strenghth and tone, PERRL, poor recall of events   Skin: no rashes, noteable scratch marks to chest back, arms and legs, no s/s infection   PSYCH:  oriented X 2, very poor insight and judgement impaired, memory impaired , affect and mood flat     Recent Results (from the past 240 hour(s))   CBC with platelets    Collection Time: 01/06/22  8:05 AM   Result Value Ref Range    WBC Count 4.4 4.0 - 11.0 10e3/uL    RBC Count 4.16 3.80 - 5.20 10e6/uL    Hemoglobin 12.0 11.7 - 15.7 g/dL    Hematocrit 39.3 35.0 - 47.0 %    MCV 95 78 - 100 fL    MCH 28.8 26.5 - 33.0 pg    MCHC 30.5 (L) 31.5 - 36.5 g/dL    RDW 14.1 10.0 - 15.0 %    Platelet Count 277 150 - 450 10e3/uL   Vitamin B12    Collection Time: 01/06/22  8:05 AM   Result Value Ref Range    Vitamin B12 811 193 - 986 pg/mL "   Ferritin    Collection Time: 01/06/22  8:05 AM   Result Value Ref Range    Ferritin 65 8 - 252 ng/mL   Folate    Collection Time: 01/06/22  8:05 AM   Result Value Ref Range    Folic Acid 44.0 >=5.4 ng/mL       Assessment/Plan:  (G30.1,  F02.80) Late onset Alzheimer's disease without behavioral disturbance (H)  (primary encounter diagnosis)  Comment: fairly advanced, anxious features, refusing personal hygiene care, bathes and showers, becoming problematic  -non-pharmacological methods have not worked   Plan: discussed trying Ativan once weekly prior to bathes, 0.25mg, staff to update if effective  -staff dispense meds, assist with cares  -supportive family    (F33.1) Moderate episode of recurrent major depressive disorder (H)  Comment: tapered off Celexa and Wellbutrin due to hyponatremia, confusion: did not see much change in mood  -Na normalized  Plan: monitor mood, staff/family to update with concerns    (H61.21) Impacted cerumen of right ear  Comment: c/o difficulty hearing to right ear, denies pain, cerumen noted  Plan: carbamide peroxide (DEBROX) 6.5 % otic solution        Irrigate ears    (L29.9) Itching  Comment: reporting extreme itchiness all over and all day long, scratching all over body and causing break in skin  -no changes in foods, detergents, lotions, medications, ect  -Lipitor was held X 1 week with no improvement  -history of allergies to air irritants per daughter  Plan: cetirizine (ZYRTEC) 5 MG tablet        -aquaphor BID to prevent dry skin  -needs to have bath or shower, has been many months since she has allowed staff to give a bath and could be contributing   -monitor for s/s infection     (F41.9) Anxiety  Comment: extreme anxiety with bathes/showers, has been refusing  -has not had good response to non-pharmacological methods  -discussed potential negative s/e of ativan with family: falls, confusion, ect, family aware and agrees to try as feels risk outweigh benefit  Plan: ativan 0.25mg  30 minutes prior to bath once weekly  -staff update with effecitveness    (M25.562,  G89.29) Chronic pain of left knee  (M25.561,  G89.29) Chronic pain of right knee  Comment: chronic pain to knees, non-ambulatory  Plan: icy hot, APAP, voltaren  -AP/lateral x-ray bilateral knees  -onsite ortho contacted and will review x-rays and determine if appropriate to try cortisone injections     (D64.9) Anemia, unspecified type  Comment: hgb needed to be optimal before would be considered for hip replacement  -started on folic acid and B12  -no acute s/s bleeding  Plan:   Lab Results   Component Value Date    HGB 12.0 01/06/2022     -see other lab levels above    (R94.31) EKG, abnormal  Comment: EKG with some ventricular abnormalities in the past  -patient appears asymptomatic but poor historian  -zio patch ordered, needs to have placed still, discussed with dtr today and she reports clinic has contacted her and she needs to call them back  Plan: staff update with concerns  -zio patch      Electronically signed by: NADINE Rogers [facility]    Chief Complaint   Patient presents with     RECHECK     HPI:  Edmundo James is a 86 year old  (1935), who is being seen today for an episodic care visit at: [facility] ([facility]) [[id number]]. Today's concern is:     Patient is an 86 year old female with  PHI significant for HTN, HLD, OA, depression, asthma, and dementia without official work-up.  She has extensive joint degeneration to her hips and knees and primarily w/c bound now.  Has been seen by [facility] for possible THIAGO and tentatively scheduled for surgery in February .    She struggled with increased confusion and falls in December, noted with hyponatremia and her Celexa was discontinued with normalization of Na, she is working with home care therapy and has not had any falls reported in the past several weeks    She is seen today at family's request due to itching,  "difficulty hearing, knee pain and difficulty with bathing.  Patient has been scratching everywhere and feeling very itchy all throughout the day.  Denies/no reports of changes in detergent, food intake, lotions, medications, ect.  No food sensitivities to her knowledge.  Daughter reports significant allergies in her history to cats and air irritants; generally results in congestion and watery eyes.  Due to her advancing dementia, she has been refusing bathes and showers as they are very distressing to her, causes a lot of anxiety.  Staff and family have tried several non-pharmacological interventions without success.  She has been getting sponge bathes only.  Discussed trying Ativan 30 minutes prior to bath and discussed potential negative s/e such as falls, increased confusion, ect.  Family aware but would like to try.  Lack of hygiene may be contributing to itching    Continues to report significant bilateral knee pain.  She is on APAP, icy hot and Voltaren, wondering if she could try cortisone injection     Following with [facility] for potential THIAGO, Hgb needs to optimal.  She was placed on folic acid and B12, she had lab work f/u last week       Recent reports of difficulty hearing out of her right ear, denies ear pain, fever         Allergies, and PMH/PSH reviewed in [facility] today.  REVIEW OF SYSTEMS:  Limited secondary to cognitive impairment but as noted in HPI    Objective:   /70   Temp 97.6  F (36.4  C)   Ht 1.6 m (5' 2.99\")   Wt 58.5 kg (129 lb)   BMI 22.86 kg/m    GENERAL APPEARANCE:  Alert, in no distress, cooperative, sitting in w/c  Ears: impacted Cerumen to right ear   RESP:  respiratory effort and palpation of chest normal, lungs clear to auscultation , no respiratory distress, non-productive cough  CV:  Palpation and auscultation of heart done , regular rate and rhythm HR 74 apical, no murmur, rub, or gallop, trace BLLE edema  ABDOMEN:  no guarding or rebound, bowel sounds normal, no " organomegaly  M/S:   Gait and station abnormal primarily in w/c, weakness, impaired ROM hips and knees due to pain, discomfort to palpation or bilateral knees   NEURO:   speech clear, no tremormal strenghth and tone, PERRL, poor recall of events   Skin: no rashes, noteable scratch marks to chest back, arms and legs, no s/s infection   PSYCH:  oriented X 2, very poor insight and judgement impaired, memory impaired , affect and mood flat     Recent Results (from the past 240 hour(s))   CBC with platelets    Collection Time: 01/06/22  8:05 AM   Result Value Ref Range    WBC Count 4.4 4.0 - 11.0 10e3/uL    RBC Count 4.16 3.80 - 5.20 10e6/uL    Hemoglobin 12.0 11.7 - 15.7 g/dL    Hematocrit 39.3 35.0 - 47.0 %    MCV 95 78 - 100 fL    MCH 28.8 26.5 - 33.0 pg    MCHC 30.5 (L) 31.5 - 36.5 g/dL    RDW 14.1 10.0 - 15.0 %    Platelet Count 277 150 - 450 10e3/uL   Vitamin B12    Collection Time: 01/06/22  8:05 AM   Result Value Ref Range    Vitamin B12 811 193 - 986 pg/mL   Ferritin    Collection Time: 01/06/22  8:05 AM   Result Value Ref Range    Ferritin 65 8 - 252 ng/mL   Folate    Collection Time: 01/06/22  8:05 AM   Result Value Ref Range    Folic Acid 44.0 >=5.4 ng/mL       Assessment/Plan:  (G30.1,  F02.80) Late onset Alzheimer's disease without behavioral disturbance (H)  (primary encounter diagnosis)  Comment: fairly advanced, anxious features, refusing personal hygiene care, bathes and showers, becoming problematic  -non-pharmacological methods have not worked   Plan: discussed trying Ativan once weekly prior to bathes, 0.25mg, staff to update if effective  -staff dispense meds, assist with cares  -supportive family    (F33.1) Moderate episode of recurrent major depressive disorder (H)  Comment: tapered off Celexa and Wellbutrin due to hyponatremia, confusion: did not see much change in mood  -Na normalized  Plan: monitor mood, staff/family to update with concerns    (H61.21) Impacted cerumen of right ear  Comment:  c/o difficulty hearing to right ear, denies pain, cerumen noted  Plan: carbamide peroxide (DEBROX) 6.5 % otic solution        Irrigate ears    (L29.9) Itching  Comment: reporting extreme itchiness all over and all day long, scratching all over body and causing break in skin  -no changes in foods, detergents, lotions, medications, ect  -Lipitor was held X 1 week with no improvement  -history of allergies to air irritants per daughter  Plan: cetirizine (ZYRTEC) 5 MG tablet        -aquaphor BID to prevent dry skin  -needs to have bath or shower, has been many months since she has allowed staff to give a bath and could be contributing   -monitor for s/s infection     (F41.9) Anxiety  Comment: extreme anxiety with bathes/showers, has been refusing  -has not had good response to non-pharmacological methods  -discussed potential negative s/e of ativan with family: falls, confusion, ect, family aware and agrees to try as feels risk outweigh benefit  Plan: ativan 0.25mg 30 minutes prior to bath once weekly  -staff update with effecitveness    (M25.562,  G89.29) Chronic pain of left knee  (M25.561,  G89.29) Chronic pain of right knee  Comment: chronic pain to knees, non-ambulatory  Plan: icy hot, APAP, voltaren  -AP/lateral x-ray bilateral knees  -onsite ortho contacted and will review x-rays and determine if appropriate to try cortisone injections     (D64.9) Anemia, unspecified type  Comment: hgb needed to be optimal before would be considered for hip replacement  -started on folic acid and B12  -no acute s/s bleeding  Plan:   Lab Results   Component Value Date    HGB 12.0 01/06/2022     -see other lab levels above    (R94.31) EKG, abnormal  Comment: EKG with some ventricular abnormalities in the past  -patient appears asymptomatic but poor historian  -zio patch ordered, needs to have placed still, discussed with dtr today and she reports clinic has contacted her and she needs to call them back  Plan: staff update with  concerns  -zio patch      Electronically signed by: NADINE Rogers CNP             Sincerely,        NADINE Rogers CNP

## 2022-01-12 ENCOUNTER — NURSING HOME VISIT (OUTPATIENT)
Dept: GERIATRICS | Facility: CLINIC | Age: 87
End: 2022-01-12
Payer: MEDICARE

## 2022-01-12 DIAGNOSIS — G89.29 CHRONIC PAIN OF LEFT KNEE: Primary | ICD-10-CM

## 2022-01-12 DIAGNOSIS — M25.562 CHRONIC PAIN OF LEFT KNEE: Primary | ICD-10-CM

## 2022-01-12 PROCEDURE — 99207 PR NO CHARGE LOS: CPT | Performed by: PHYSICIAN ASSISTANT

## 2022-01-12 NOTE — PROGRESS NOTES
Ortho Nursing home visit    Edmundo James is a 86 year old female who resides at [facility]    Patient is seen today for Juma knee pain, unable to ambulate W/O pain, also has hs of left hip OA< and has seen Dr. Garcia for this, some discussion regarding a THIAGO in the future, today's visit is to discuss knee OA, and treatment;  Patient is seen in APT with . Both reside in [facility]      Past Medical History:   Diagnosis Date     Heart disease      HTN (hypertension)      Hyperlipidemia      OA (osteoarthritis) of knee      Uncomplicated asthma       Past Surgical History:   Procedure Laterality Date     CHOLECYSTECTOMY       LUMPECTOMY BREAST       TONSILLECTOMY          Allergies   Allergen Reactions     Cats Other (See Comments)     Runny nose, watery eyes      Dogs Other (See Comments)     Runny nose and watery eyes      Mold Other (See Comments)     Runny nose, watery eyes       There were no vitals taken for this visit.     Exam: Alert cooperative female, seated, allows PROM to left knee -20/80 lig intact, no locking, pain medial /lateral, PF joint, moderate crepitus, no noted effusion, varus deformity.  Right knee PROM -10/90 lig intact, pain medial, lateral, PF joint, no swelling or effusion;    Skin intact; juma knees,       X-rays show : End stage OA< Left knee, tricompartmental. Right knee advanced OA< most advanced in medial joint;    ASSESSMENT / PLAN: Discussed with patient and , how far advance the arthritis is in both knees, that, after review of her MR. Patient is not a candidate for TKA< and I feel not a candidate for THIAGO< given how far advanced her OA is in the knees;    I have left a message with the daughter, and left my contact info;    I did offer to inject both knee's today with cortisone, knowing this may not have any long term benefit , discussed R&B  And after consent; both knees prepped, injected with 1 ml depo medrol, 4 ml 1% lidocaine, into medial joints, Juma knees    I  discussed using, lidocaine patch; topical gel for long term, and not attempting to walk to bathroom W/O asst;    [id number]          JGisela OPA-C  [phone] Cell

## 2022-01-13 ENCOUNTER — HOSPITAL ENCOUNTER (OUTPATIENT)
Facility: CLINIC | Age: 87
End: 2022-01-13
Attending: ORTHOPAEDIC SURGERY | Admitting: ORTHOPAEDIC SURGERY
Payer: MEDICARE

## 2022-01-19 ENCOUNTER — HOSPITAL ENCOUNTER (OUTPATIENT)
Dept: CARDIOLOGY | Facility: CLINIC | Age: 87
End: 2022-01-19
Attending: NURSE PRACTITIONER
Payer: MEDICARE

## 2022-01-19 DIAGNOSIS — R94.31 EKG, ABNORMAL: ICD-10-CM

## 2022-01-19 NOTE — PROGRESS NOTES
Registered patient for a mail out Zio patch. Patient will receive monitor in the mail for at home placement.

## 2022-01-24 PROCEDURE — 93244 EXT ECG>48HR<7D REV&INTERPJ: CPT | Performed by: INTERNAL MEDICINE

## 2022-01-26 DIAGNOSIS — Z11.59 ENCOUNTER FOR SCREENING FOR OTHER VIRAL DISEASES: Primary | ICD-10-CM

## 2022-02-01 ENCOUNTER — ASSISTED LIVING VISIT (OUTPATIENT)
Dept: GERIATRICS | Facility: CLINIC | Age: 87
End: 2022-02-01
Payer: MEDICARE

## 2022-02-01 VITALS
BODY MASS INDEX: 22.86 KG/M2 | WEIGHT: 129 LBS | SYSTOLIC BLOOD PRESSURE: 135 MMHG | HEART RATE: 80 BPM | OXYGEN SATURATION: 92 % | DIASTOLIC BLOOD PRESSURE: 83 MMHG | RESPIRATION RATE: 18 BRPM | TEMPERATURE: 97.7 F | HEIGHT: 63 IN

## 2022-02-01 DIAGNOSIS — M15.0 PRIMARY OSTEOARTHRITIS INVOLVING MULTIPLE JOINTS: Primary | ICD-10-CM

## 2022-02-01 DIAGNOSIS — L28.0 NEURODERMATITIS: ICD-10-CM

## 2022-02-01 DIAGNOSIS — M25.551 HIP PAIN, RIGHT: ICD-10-CM

## 2022-02-01 DIAGNOSIS — M25.562 CHRONIC PAIN OF LEFT KNEE: ICD-10-CM

## 2022-02-01 DIAGNOSIS — G89.29 CHRONIC PAIN OF LEFT KNEE: ICD-10-CM

## 2022-02-01 DIAGNOSIS — G30.1 LATE ONSET ALZHEIMER'S DISEASE WITHOUT BEHAVIORAL DISTURBANCE (H): ICD-10-CM

## 2022-02-01 DIAGNOSIS — I10 ESSENTIAL HYPERTENSION: ICD-10-CM

## 2022-02-01 DIAGNOSIS — R00.0 TACHYCARDIA: ICD-10-CM

## 2022-02-01 DIAGNOSIS — F02.80 LATE ONSET ALZHEIMER'S DISEASE WITHOUT BEHAVIORAL DISTURBANCE (H): ICD-10-CM

## 2022-02-01 ASSESSMENT — MIFFLIN-ST. JEOR: SCORE: 1004.27

## 2022-02-01 NOTE — LETTER
2/1/2022        RE: Edmundo James  [id number]  [place] [address]        Edmundo James is a 86 year old female seen February 1, 2022 at [facility] where she has resided for 2 years (admit 9/2019) seen to follow up significant decline in mobility.    Pt is seen in her apartment that she shares with her  Kenny, and he provides history   Pt has a long h/o OA in right hip and both knees.   She has not walked in many months.   Last October she could stand-pivot transfer but otherwise WC bound.   She was seen at [facility] in November and THIAGO planned, delayed by pandemic.  In the interim pt has lost the ability to stand and now requires EZ stand for transfers.   Realized today that THIAGO is now scheduled for next week.  I talked with Kenny and patient's daughter Kandice.  They are most concerned about patient's pain and believe that by relieving that with surgery pt will regain mobility.     Last month patient noted to have tachycardia and abnormal EKG.  Pt had Ziopatch placed for follow up, not sent back yet, still on kitchen counter today.   Pt has also had diffuse itching, scratching and has excoriated those areas that she can reach.   She had not had a bath in many weeks, resistant.  Finally accomplished yesterday.      By chart review, pt has had a progressive cognitive and physical decline over the past several years.   She is no longer ambulatory and language is impaired as well, both hallmarks of her cognitive decline.     In the two years they have been in [place] pt not been seen by a medical provider until August 2021.  She has medications but not taking them consistently, so now signed on for medication administration by AL staff.  Family has reported cognitive impairment that preceded the move to [place], she was on donepezil in [place], but no formal workup.   Donepezil discontinued secondary to side effects.   Pt had a symptomatic COVID 19 infection in early September 2021, then  "later that month she had an overnight stay at [facility] after she accidentally took her 's medications.  She was unresponsive with BP 52/27 initially.   She improved with IVF and was able to return to [place].       Past Medical History:   Diagnosis Date     Heart disease      HTN (hypertension)      Hyperlipidemia      OA (osteoarthritis) of knee      Uncomplicated asthma    Late onset Alzheimer's dementia  Depression /anxiety       Past Surgical History:   Procedure Laterality Date     CHOLECYSTECTOMY       LUMPECTOMY BREAST       TONSILLECTOMY       SH: Lives with her  [address] apartment.   They moved to [place] from [place], [place] in 2019   Daughter Kandice lives locally and helps with cares.   Non smoker    ROS: eating well and weight has been stable, sleeps through the night.   Still occ diarrhea     EXAM: NAD  /83   Pulse 80   Temp 97.7  F (36.5  C)   Resp 18   Ht 1.6 m (5' 3\")   Wt 58.5 kg (129 lb)   SpO2 92%   BMI 22.85 kg/m     Neck supple without adenopathy  Lungs clear bilaterally with fair air movement   Heart RRR s1s2  Abd soft, NT, no distention or guarding,+BS  Ext without edema.  Cracking and crepitus with ROM of knees, flexion contractures bilaterally.   Excoriated areas of torso, extremities.  No clear rash.    Neuro: WC bound, limited verbal skills  Psych: affect flat     Last Comprehensive Metabolic Panel:  Sodium   Date Value Ref Range Status   12/28/2021 137 133 - 144 mmol/L Final     Potassium   Date Value Ref Range Status   12/28/2021 3.4 3.4 - 5.3 mmol/L Final     Chloride   Date Value Ref Range Status   12/28/2021 104 94 - 109 mmol/L Final     Carbon Dioxide (CO2)   Date Value Ref Range Status   12/28/2021 27 20 - 32 mmol/L Final     Anion Gap   Date Value Ref Range Status   12/28/2021 6 3 - 14 mmol/L Final     Glucose   Date Value Ref Range Status   12/28/2021 111 (H) 70 - 99 mg/dL Final     Urea Nitrogen   Date Value Ref Range Status   12/28/2021 15 7 - 30 mg/dL Final "     Creatinine   Date Value Ref Range Status   12/28/2021 0.53 0.52 - 1.04 mg/dL Final     GFR Estimate   Date Value Ref Range Status   12/28/2021 >90 >60 mL/min/1.73m2 Final     Comment:     Effective December 21, 2021 eGFRcr in adults is calculated using the 2021 CKD-EPI creatinine equation which includes age and gender (Dru et al., NE, DOI: 10.1056/NCHWwz6805763)     Calcium   Date Value Ref Range Status   12/28/2021 8.7 8.5 - 10.1 mg/dL Final     Lab Results   Component Value Date    WBC 4.4 01/06/2022      HGB 12.0 01/06/2022      MCV 95 01/06/2022       01/06/2022     TSH   Date Value Ref Range Status   11/30/2021 3.03 0.40 - 4.00 mU/L Final          IMP/PLAN:   (M89.49) Primary osteoarthritis involving multiple joints    (M25.562,  G89.29) Chronic pain of left knee  (M25.551) Hip pain, right  Comment: EZstand transfers bed to , not ambulatory,  Contractures both knees     Pt had cortisone injections both knees on 1/12: Unclear if pain improved, no change in mobility   She has worked with [facility]   Plan: Reviewed with  Kenny, daughter Kandice and HERNESTO Sanchez at [facility].   Given patient's significant change since last November, would like her seen by Orthopedics again before surgery, so will need to postpone THIAGO.  She is very high risk for this surgery, and not clear that it will improve her mobility.   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       (R00.0) Tachycardia  Comment: abnormal EKG  Normal electrolytes and hgb improved.   Plan: awaiting Ziopatch results; device not sent in for reading yet.   Would like to see results before clearing for surgery.       (I10) Essential hypertension  Comment: regimen has  been greatly reduced    BP Readings from Last 3 Encounters:   02/01/22 135/83   01/11/22 122/70   12/21/21 100/60      Plan: valsartan 80 mg/day  Follow bps and BMP     (G30.1,  F02.80) Late onset Alzheimer's disease without behavioral disturbance (H)  Comment: loss of mobility and language, very low functional status   Plan: AL support for med admin, meals, activity     (E78.5) Hyperlipidemia, unspecified hyperlipidemia type  Comment: no reported CV event   Plan: remains on atorvastatin 20 mg/day for primary prevention      Lary Sue MD         Sincerely,        Lary Sue MD

## 2022-02-03 NOTE — PROGRESS NOTES
Edmundo James is a 86 year old female seen February 1, 2022 at [facility] where she has resided for 2 years (admit 9/2019) seen to follow up significant decline in mobility.    Pt is seen in her apartment that she shares with her  Kenny, and he provides history   Pt has a long h/o OA in right hip and both knees.   She has not walked in many months.   Last October she could stand-pivot transfer but otherwise WC bound.   She was seen at [facility] in November and THIAGO planned, delayed by pandemic.  In the interim pt has lost the ability to stand and now requires EZ stand for transfers.   Realized today that THIAGO is now scheduled for next week.  I talked with Kenny and patient's daughter Kandice.  They are most concerned about patient's pain and believe that by relieving that with surgery pt will regain mobility.     Last month patient noted to have tachycardia and abnormal EKG.  Pt had Ziopatch placed for follow up, not sent back yet, still on kitchen counter today.   Pt has also had diffuse itching, scratching and has excoriated those areas that she can reach.   She had not had a bath in many weeks, resistant.  Finally accomplished yesterday.      By chart review, pt has had a progressive cognitive and physical decline over the past several years.   She is no longer ambulatory and language is impaired as well, both hallmarks of her cognitive decline.     In the two years they have been in [place] pt not been seen by a medical provider until August 2021.  She has medications but not taking them consistently, so now signed on for medication administration by AL staff.  Family has reported cognitive impairment that preceded the move to [place], she was on donepezil in [place], but no formal workup.   Donepezil discontinued secondary to side effects.   Pt had a symptomatic COVID 19 infection in early September 2021, then later that month she had an overnight stay at [facility] after she accidentally took her 's  "medications.  She was unresponsive with BP 52/27 initially.   She improved with IVF and was able to return to AL.       Past Medical History:   Diagnosis Date     Heart disease      HTN (hypertension)      Hyperlipidemia      OA (osteoarthritis) of knee      Uncomplicated asthma    Late onset Alzheimer's dementia  Depression /anxiety       Past Surgical History:   Procedure Laterality Date     CHOLECYSTECTOMY       LUMPECTOMY BREAST       TONSILLECTOMY       SH: Lives with her  [place], [place] apartment.   They moved to [place] from [place], [place] in 2019   Daughter Kandice lives locally and helps with cares.   Non smoker    ROS: eating well and weight has been stable, sleeps through the night.   Still occ diarrhea     EXAM: NAD  /83   Pulse 80   Temp 97.7  F (36.5  C)   Resp 18   Ht 1.6 m (5' 3\")   Wt 58.5 kg (129 lb)   SpO2 92%   BMI 22.85 kg/m     Neck supple without adenopathy  Lungs clear bilaterally with fair air movement   Heart RRR s1s2  Abd soft, NT, no distention or guarding,+BS  Ext without edema.  Cracking and crepitus with ROM of knees, flexion contractures bilaterally.   Excoriated areas of torso, extremities.  No clear rash.    Neuro: WC bound, limited verbal skills  Psych: affect flat     Last Comprehensive Metabolic Panel:  Sodium   Date Value Ref Range Status   12/28/2021 137 133 - 144 mmol/L Final     Potassium   Date Value Ref Range Status   12/28/2021 3.4 3.4 - 5.3 mmol/L Final     Chloride   Date Value Ref Range Status   12/28/2021 104 94 - 109 mmol/L Final     Carbon Dioxide (CO2)   Date Value Ref Range Status   12/28/2021 27 20 - 32 mmol/L Final     Anion Gap   Date Value Ref Range Status   12/28/2021 6 3 - 14 mmol/L Final     Glucose   Date Value Ref Range Status   12/28/2021 111 (H) 70 - 99 mg/dL Final     Urea Nitrogen   Date Value Ref Range Status   12/28/2021 15 7 - 30 mg/dL Final     Creatinine   Date Value Ref Range Status   12/28/2021 0.53 0.52 - 1.04 mg/dL Final     GFR Estimate "   Date Value Ref Range Status   12/28/2021 >90 >60 mL/min/1.73m2 Final     Comment:     Effective December 21, 2021 eGFRcr in adults is calculated using the 2021 CKD-EPI creatinine equation which includes age and gender (Dru et al., NEJ, DOI: 10.1056/EMENoj7003897)     Calcium   Date Value Ref Range Status   12/28/2021 8.7 8.5 - 10.1 mg/dL Final     Lab Results   Component Value Date    WBC 4.4 01/06/2022      HGB 12.0 01/06/2022      MCV 95 01/06/2022       01/06/2022     TSH   Date Value Ref Range Status   11/30/2021 3.03 0.40 - 4.00 mU/L Final          IMP/PLAN:   (M89.49) Primary osteoarthritis involving multiple joints    (M25.562,  G89.29) Chronic pain of left knee  (M25.551) Hip pain, right  Comment: EZstand transfers bed to , not ambulatory,  Contractures both knees     Pt had cortisone injections both knees on 1/12: Unclear if pain improved, no change in mobility   She has worked with [facility]   Plan: Reviewed with  Kenny, daughter Kandice and HERNESTO Sanchez at [facility].   Given patient's significant change since last November, would like her seen by Orthopedics again before surgery, so will need to postpone THIAGO.  She is very high risk for this surgery, and not clear that it will improve her mobility.   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       (R00.0) Tachycardia  Comment: abnormal EKG  Normal electrolytes and hgb improved.   Plan: awaiting Ziopatch results; device not sent in for reading yet.   Would like to see results before clearing for surgery.       (I10) Essential hypertension  Comment: regimen has been greatly reduced    BP Readings from Last 3 Encounters:   02/01/22 135/83   01/11/22 122/70    12/21/21 100/60      Plan: valsartan 80 mg/day  Follow bps and BMP     (G30.1,  F02.80) Late onset Alzheimer's disease without behavioral disturbance (H)  Comment: loss of mobility and language, very low functional status   Plan: AL support for med admin, meals, activity     (E78.5) Hyperlipidemia, unspecified hyperlipidemia type  Comment: no reported CV event   Plan: remains on atorvastatin 20 mg/day for primary prevention      Lary Sue MD

## 2022-02-08 DIAGNOSIS — F41.9 ANXIETY: ICD-10-CM

## 2022-02-08 RX ORDER — LORAZEPAM 0.5 MG/1
0.5 TABLET ORAL WEEKLY
Qty: 4 TABLET | Refills: 0 | Status: SHIPPED | OUTPATIENT
Start: 2022-02-08 | End: 2022-03-24

## 2022-02-21 VITALS
DIASTOLIC BLOOD PRESSURE: 44 MMHG | HEIGHT: 63 IN | SYSTOLIC BLOOD PRESSURE: 70 MMHG | RESPIRATION RATE: 15 BRPM | BODY MASS INDEX: 22.86 KG/M2 | HEART RATE: 98 BPM | TEMPERATURE: 97.6 F | WEIGHT: 129 LBS

## 2022-02-21 NOTE — PROGRESS NOTES
[facility] GERIATRICS    Chief Complaint   Patient presents with     RECHECK     HPI:  Edmundo James is a 86 year old  (1935), who is being seen today for an episodic care visit at: [facility] ([facility]) [[id number]]. Today's concern is:     Patient is an 86 year old female with  PHI significant for HTN, HLD, OA, depression, asthma, and dementia without official work-up.  She has extensive joint degeneration to her hips and knees and primarily w/c bound now.  Has been seen by [facility] for possible THIAGO.  Was initially planned to have surgery in February but with significant change in patient condition, surgery was cancelled.     Patient struggled with increased confusion and falls in December, noted with hyponatremia and her Celexa was discontinued with normalization of Na.  She worked with home care therapy and discharged recently.  She had 2  reported 2/6 when patient rolled out of bed in the middle of the night 2 times without injury    Patient is seen today for follow-up on POC after visit with [facility].  Per daughter, she had a novocain injection to her right hip 2/21 to see if would improve her mobility to assess if she would benefit from THIAGO.  Per family/staff, did not appear to be helpful.  Daughter plans to f/u with [facility], I am unable to see their notes    Patient continues to scratch and be itchy, Zyrtec has not seemed to make a difference.  She has had a bath 2X's now in past month with addition of Ativan prior to bathes and this has helped with the itching      Noted with elevated HR end of last year and abnormal EKG, zio patch was done and results in epic, noted with with runs of V-tach, patient is unaware.       Patient is in her apartment today.  She is a poor historian and unable to contribute to HPI much.  She denies pain currently, feels she is sleeping well and denies shortness of breath or CP    I was able to discuss our visit and POC with her daughter, Kandice, today     Allergies,  "and PMH/PSH reviewed in [facility] today.  REVIEW OF SYSTEMS:  Limited secondary to cognitive impairment but as noted in HPI    Objective:   BP (!) 70/44   Pulse 98   Temp 97.6  F (36.4  C)   Resp 15   Ht 1.6 m (5' 3\")   Wt 58.5 kg (129 lb)   BMI 22.85 kg/m    GENERAL APPEARANCE:  Alert, in no distress, cooperative, sitting in w/c   RESP:  respiratory effort and palpation of chest normal, lungs clear to auscultation , no respiratory distress, no cough  CV:  Palpation and auscultation of heart done , regular rate and rhythm HR 70 apical, no murmur, rub, or gallop, no edema today  ABDOMEN:  no guarding or rebound, bowel sounds normal, no organomegaly  M/S:   Gait and station abnormal primarily in w/c, weakness, impaired ROM hips and knees due to pain, discomfort to palpation or bilateral knees, knees with flexion contractures and crepitus   NEURO:   speech clear, no tremormal strenghth and tone, PERRL, poor recall of events   Skin: no rashes, noteable scratch marks to chest back, arms and legs, no s/s infection   PSYCH:  oriented X 2, very poor insight and judgement impaired, memory impaired , affect and mood flat     Recent labs in [facility] reviewed by me today.     Assessment/Plan:  (L28.0) Neurodermatitis  (primary encounter diagnosis)  Comment: poor hygiene and dry skin contribute  -ativan prior to bath has been moderately effective  -zyrtec with no benefit noted  Plan: diphenhydrAMINE (BENADRYL) 2 % external cream        BID and every day prn  -d'c zyrtec  -continue bathes and Aquaphor     (M89.49) Primary osteoarthritis involving multiple joints  (M25.551) Hip pain, right  (M25.561,  G89.29) Chronic pain of right knee  Comment: advanced OA to right hip and knees  -poor surgical candidate due to advancing dementia  -surgery for THIAGO for Feb was cancelled, unlikely will be rescheduled but daughter to be in contact with [facility].  I am unable to see their notes.  Patient had novocain injection yesterday to right hip with no " noted benefit per staff  Plan: icy hot, APAP, voltaren  -[facility] f/u    (R00.0) Tachycardia  Comment: abnormal EKG from elevated HR noted fall 2021  -ziopatch results recently available, patient has some runs of v-tach, patient is unaware  Plan: will review with collaborating physician, but likely will not change POC at this time.  Patient has been on metoprolol in past with low BP and low HR at times     (G30.1,  F02.80) Late onset Alzheimer's disease without behavioral disturbance (H)  (F41.9) Anxiety  Comment: advancing dementia with anxious features, impaired language and now no longer ambulatory, refusing bathes, has improved some with ativan prior to bath   Plan: staff dispense meds and assist with cares  -ativan prior to bathes  -supportive family      Electronically signed by: NADINE Rogers CNP

## 2022-02-22 ENCOUNTER — ASSISTED LIVING VISIT (OUTPATIENT)
Dept: GERIATRICS | Facility: CLINIC | Age: 87
End: 2022-02-22
Payer: MEDICARE

## 2022-02-22 DIAGNOSIS — M25.551 HIP PAIN, RIGHT: ICD-10-CM

## 2022-02-22 DIAGNOSIS — F41.9 ANXIETY: ICD-10-CM

## 2022-02-22 DIAGNOSIS — M25.561 CHRONIC PAIN OF RIGHT KNEE: ICD-10-CM

## 2022-02-22 DIAGNOSIS — M15.0 PRIMARY OSTEOARTHRITIS INVOLVING MULTIPLE JOINTS: ICD-10-CM

## 2022-02-22 DIAGNOSIS — F02.80 LATE ONSET ALZHEIMER'S DISEASE WITHOUT BEHAVIORAL DISTURBANCE (H): ICD-10-CM

## 2022-02-22 DIAGNOSIS — G89.29 CHRONIC PAIN OF RIGHT KNEE: ICD-10-CM

## 2022-02-22 DIAGNOSIS — R00.0 TACHYCARDIA: ICD-10-CM

## 2022-02-22 DIAGNOSIS — G30.1 LATE ONSET ALZHEIMER'S DISEASE WITHOUT BEHAVIORAL DISTURBANCE (H): ICD-10-CM

## 2022-02-22 DIAGNOSIS — L28.0 NEURODERMATITIS: Primary | ICD-10-CM

## 2022-02-22 NOTE — LETTER
2/22/2022        RE: Edmundo James  [address] [address]  [place] [address]        [facility] GERIATRICS    Chief Complaint   Patient presents with     RECHECK     HPI:  Edmundo James is a 86 year old  (1935), who is being seen today for an episodic care visit at: [facility] ([facility]) [[id number]]. Today's concern is:     Patient is an 86 year old female with  PHI significant for HTN, HLD, OA, depression, asthma, and dementia without official work-up.  She has extensive joint degeneration to her hips and knees and primarily w/c bound now.  Has been seen by [facility] for possible THIAGO.  Was initially planned to have surgery in February but with significant change in patient condition, surgery was cancelled.     Patient struggled with increased confusion and falls in December, noted with hyponatremia and her Celexa was discontinued with normalization of Na.  She worked with home care therapy and discharged recently.  She had 2  reported 2/6 when patient rolled out of bed in the middle of the night 2 times without injury    Patient is seen today for follow-up on POC after visit with [facility].  Per daughter, she had a novocain injection to her right hip 2/21 to see if would improve her mobility to assess if she would benefit from THIAGO.  Per family/staff, did not appear to be helpful.  Daughter plans to f/u with [facility], I am unable to see their notes    Patient continues to scratch and be itchy, Zyrtec has not seemed to make a difference.  She has had a bath 2X's now in past month with addition of Ativan prior to bathes and this has helped with the itching      Noted with elevated HR end of last year and abnormal EKG, zio patch was done and results in epic, noted with with runs of V-tach, patient is unaware.       Patient is in her apartment today.  She is a poor historian and unable to contribute to [facility] much.  She denies pain currently, feels she is sleeping well and denies shortness of  "breath or CP    I was able to discuss our visit and POC with her daughter, Kandice, today     Allergies, and PMH/PSH reviewed in [facility] today.  REVIEW OF SYSTEMS:  Limited secondary to cognitive impairment but as noted in HPI    Objective:   BP (!) 70/44   Pulse 98   Temp 97.6  F (36.4  C)   Resp 15   Ht 1.6 m (5' 3\")   Wt 58.5 kg (129 lb)   BMI 22.85 kg/m    GENERAL APPEARANCE:  Alert, in no distress, cooperative, sitting in w/c   RESP:  respiratory effort and palpation of chest normal, lungs clear to auscultation , no respiratory distress, no cough  CV:  Palpation and auscultation of heart done , regular rate and rhythm HR 70 apical, no murmur, rub, or gallop, no edema today  ABDOMEN:  no guarding or rebound, bowel sounds normal, no organomegaly  M/S:   Gait and station abnormal primarily in w/c, weakness, impaired ROM hips and knees due to pain, discomfort to palpation or bilateral knees, knees with flexion contractures and crepitus   NEURO:   speech clear, no tremormal strenghth and tone, PERRL, poor recall of events   Skin: no rashes, noteable scratch marks to chest back, arms and legs, no s/s infection   PSYCH:  oriented X 2, very poor insight and judgement impaired, memory impaired , affect and mood flat     Recent labs in [facility] reviewed by me today.     Assessment/Plan:  (L28.0) Neurodermatitis  (primary encounter diagnosis)  Comment: poor hygiene and dry skin contribute  -ativan prior to bath has been moderately effective  -zyrtec with no benefit noted  Plan: diphenhydrAMINE (BENADRYL) 2 % external cream        BID and every day prn  -d'c zyrtec  -continue bathes and Aquaphor     (M89.49) Primary osteoarthritis involving multiple joints  (M25.551) Hip pain, right  (M25.561,  G89.29) Chronic pain of right knee  Comment: advanced OA to right hip and knees  -poor surgical candidate due to advancing dementia  -surgery for THIAGO for Feb was cancelled, unlikely will be rescheduled but daughter to be in contact " with [facility].  I am unable to see their notes.  Patient had novocain injection yesterday to right hip with no noted benefit per staff  Plan: icy hot, APAP, voltaren  -[facility] f/u    (R00.0) Tachycardia  Comment: abnormal EKG from elevated HR noted fall 2021  -ziopatch results recently available, patient has some runs of v-tach, patient is unaware  Plan: will review with collaborating physician, but likely will not change POC at this time.  Patient has been on metoprolol in past with low BP and low HR at times     (G30.1,  F02.80) Late onset Alzheimer's disease without behavioral disturbance (H)  (F41.9) Anxiety  Comment: advancing dementia with anxious features, impaired language and now no longer ambulatory, refusing bathes, has improved some with ativan prior to bath   Plan: staff dispense meds and assist with cares  -ativan prior to bathes  -supportive family      Electronically signed by: NADINE Rogers CNP             Sincerely,        NADINE Rogers CNP

## 2022-02-22 NOTE — Clinical Note
When you get a chance, can you look at zio patch reports and let me know what you think.  She has a few runs of v-tach, but I wasn't inclined to make any med changes, let me know if you feel otherwise    Thanks!  Marta

## 2022-03-24 DIAGNOSIS — F41.9 ANXIETY: ICD-10-CM

## 2022-03-24 RX ORDER — LORAZEPAM 0.5 MG/1
1 TABLET ORAL WEEKLY
Qty: 8 TABLET | Refills: 0 | Status: SHIPPED | OUTPATIENT
Start: 2022-03-24 | End: 2022-05-02

## 2022-03-24 NOTE — PROGRESS NOTES
Patient continues to refuse bath with staff.  Developing rash and itching related to neurodermatitis and poor hygiene.  Have discussed with family in past and they are aware of potential s/e of benzos but feel risk outweighs benefit to help with quality of life    Plan: increase Ativan to 1mg po 30 minutes prior to bath     NADINE Rogers CNP on 3/24/2022 at 4:16 PM

## 2022-03-28 DIAGNOSIS — L29.9 ITCHING: ICD-10-CM

## 2022-03-28 RX ORDER — MINERAL OIL/HYDROPHIL PETROLAT
OINTMENT (GRAM) TOPICAL
Qty: 198 G | Refills: 97 | Status: SHIPPED | OUTPATIENT
Start: 2022-03-28 | End: 2022-09-06

## 2022-04-11 VITALS
SYSTOLIC BLOOD PRESSURE: 128 MMHG | HEART RATE: 63 BPM | WEIGHT: 129.6 LBS | DIASTOLIC BLOOD PRESSURE: 70 MMHG | HEIGHT: 63 IN | BODY MASS INDEX: 22.96 KG/M2 | TEMPERATURE: 97.5 F | RESPIRATION RATE: 18 BRPM

## 2022-04-11 NOTE — PROGRESS NOTES
"[facility] GERIATRICS    Chief Complaint   Patient presents with     RECHECK     HPI:  Edmundo James is a 86 year old  (1935), who is being seen today for an episodic care visit at: [facility] ([facility]) [[id number]]. Today's concern is:     Patient is an 86 year old female with  PHI significant for HTN, HLD, OA, depression, asthma, and dementia without official work-up.  She has extensive joint degeneration to her hips and knees and primarily w/c bound now.  Has been seen by [facility] for possible THIAGO.  Was initially planned to have surgery in February 2022 but with significant change in patient condition, surgery was cancelled.     Patient struggled with increased confusion and falls in December, noted with hyponatremia and her Celexa was discontinued with normalization of Na.  She worked with home care therapy and discharged in February .  She is seen today for follow-up on multiple co morbidities.  No recent falls noted.  Ativan was started previously 30 minutes prior to bathes due to increased resistance to bath, dose was increased a couple of times and at 1mg before bathes, which has helped significantly.  Per staff, patient rarely refusing bath.  Her itching and neurodermatitis has also improved with this.      Patient seen today in her apartment.  She reports feeling well, denies CP, shortness of breath.  Denies pain other than with transfers, sleeping well per her report.  Staff without concerns today.  I did reach out to her daughter, Kandice, was unable to reach and voice mailbox full    Allergies, and PMH/PSH reviewed in EPIC today.  REVIEW OF SYSTEMS:  Limited secondary to cognitive impairment but as noted in HPI    Objective:   /70   Pulse 63   Temp 97.5  F (36.4  C)   Resp 18   Ht 1.6 m (5' 3\")   Wt 58.8 kg (129 lb 9.6 oz)   BMI 22.96 kg/m    GENERAL APPEARANCE:  Alert, in no distress, cooperative, sitting in w/c   RESP:  respiratory effort and palpation of chest normal, " lungs clear to auscultation , no respiratory distress, no cough  CV:  Palpation and auscultation of heart done , regular rate and rhythm HR 80's apical, no murmur, rub, or gallop, no edema today  ABDOMEN:  no guarding or rebound, bowel sounds normal, no organomegaly  M/S:   Gait and station abnormal primarily in w/c, weakness, impaired ROM hips and knees due to pain, bilateral knees with flexion contractures   NEURO:   speech clear, no tremormal strenghth and tone, PERRL, poor recall of events   Skin: no rashes or scratch marks noted today   PSYCH:  oriented X 2, very poor insight and judgement impaired, memory impaired , affect and mood flat     Recent labs in EPIC reviewed by me today.     Assessment/Plan:  (G30.1,  F02.80) Late onset Alzheimer's disease without behavioral disturbance (H)  (primary encounter diagnosis)  (F41.9) Anxiety   (F33.1) Moderate episode of recurrent major depressive disorder (H)  Comment: advancing dementia with anxious features, impaired language and now no longer ambulatory, refusing bathes, has improved some with ativan prior to bath   Plan: staff dispense meds and assist with cares  -ativan prior to bathes, dose increased to 1mg and this has been very helpful   -supportive family    (M89.49) Primary osteoarthritis involving multiple joints  (M25.551) Hip pain, right  (M25.561,  G89.29) Chronic pain of right knee  Comment: advanced OA to right hip and knees  -poor surgical candidate due to advancing dementia  -surgery for THIAGO for Feb was cancelled, unlikely will be rescheduled  Plan: icy hot, APAP, voltaren  -[facility] f/u prn    (L28.0) Neurodermatitis  Comment: poor hygiene and dry skin contribute  -ativan prior to bath has been very helpful, symptoms decreased with increased compliance with bathes  Plan: diphenhydrAMINE (BENADRYL) 2 % external cream        BID and every day prn  -continue bathes and Aquaphor     (R00.0) Tachycardia  Comment: abnormal EKG from elevated HR noted fall  2021  -ziopatch results recently available, patient has some runs of v-tach, patient is unaware, discussed with Dr. Sue and no changes recommended   Plan: monitor    Electronically signed by: NADINE Rogers CNP

## 2022-04-12 ENCOUNTER — ASSISTED LIVING VISIT (OUTPATIENT)
Dept: GERIATRICS | Facility: CLINIC | Age: 87
End: 2022-04-12
Payer: MEDICARE

## 2022-04-12 DIAGNOSIS — M25.561 CHRONIC PAIN OF RIGHT KNEE: ICD-10-CM

## 2022-04-12 DIAGNOSIS — R23.8 SKIN BREAKDOWN: ICD-10-CM

## 2022-04-12 DIAGNOSIS — G89.29 CHRONIC PAIN OF RIGHT KNEE: ICD-10-CM

## 2022-04-12 DIAGNOSIS — M25.551 HIP PAIN, RIGHT: ICD-10-CM

## 2022-04-12 DIAGNOSIS — F33.1 MODERATE EPISODE OF RECURRENT MAJOR DEPRESSIVE DISORDER (H): ICD-10-CM

## 2022-04-12 DIAGNOSIS — F02.80 LATE ONSET ALZHEIMER'S DISEASE WITHOUT BEHAVIORAL DISTURBANCE (H): ICD-10-CM

## 2022-04-12 DIAGNOSIS — R00.0 TACHYCARDIA: ICD-10-CM

## 2022-04-12 DIAGNOSIS — M15.0 PRIMARY OSTEOARTHRITIS INVOLVING MULTIPLE JOINTS: ICD-10-CM

## 2022-04-12 DIAGNOSIS — G30.1 LATE ONSET ALZHEIMER'S DISEASE WITHOUT BEHAVIORAL DISTURBANCE (H): ICD-10-CM

## 2022-04-12 DIAGNOSIS — F41.9 ANXIETY: ICD-10-CM

## 2022-04-12 DIAGNOSIS — L28.0 NEURODERMATITIS: Primary | ICD-10-CM

## 2022-04-12 RX ORDER — ANORECTAL OINTMENT 15.7; .44; 24; 20.6 G/100G; G/100G; G/100G; G/100G
OINTMENT TOPICAL 2 TIMES DAILY
Start: 2022-04-12 | End: 2022-05-03

## 2022-04-12 NOTE — LETTER
"    4/12/2022        RE: Edmundo James  [address] [address]  [place] [address]        [facility] GERIATRICS    Chief Complaint   Patient presents with     RECHECK     HPI:  Edmundo James is a 86 year old  (1935), who is being seen today for an episodic care visit at: [facility] ([facility]) [[id number]]. Today's concern is:     Patient is an 86 year old female with  PHI significant for HTN, HLD, OA, depression, asthma, and dementia without official work-up.  She has extensive joint degeneration to her hips and knees and primarily w/c bound now.  Has been seen by [facility] for possible THIAGO.  Was initially planned to have surgery in February 2022 but with significant change in patient condition, surgery was cancelled.     Patient struggled with increased confusion and falls in December, noted with hyponatremia and her Celexa was discontinued with normalization of Na.  She worked with home care therapy and discharged in February .  She is seen today for follow-up on multiple co morbidities.  No recent falls noted.  Ativan was started previously 30 minutes prior to bathes due to increased resistance to bath, dose was increased a couple of times and at 1mg before bathes, which has helped significantly.  Per staff, patient rarely refusing bath.  Her itching and neurodermatitis has also improved with this.      Patient seen today in her apartment.  She reports feeling well, denies CP, shortness of breath.  Denies pain other than with transfers, sleeping well per her report.  Staff without concerns today.  I did reach out to her daughter, Kandice, was unable to reach and voice mailbox full    Allergies, and PMH/PSH reviewed in [facility] today.  REVIEW OF SYSTEMS:  Limited secondary to cognitive impairment but as noted in HPI    Objective:   /70   Pulse 63   Temp 97.5  F (36.4  C)   Resp 18   Ht 1.6 m (5' 3\")   Wt 58.8 kg (129 lb 9.6 oz)   BMI 22.96 kg/m    GENERAL APPEARANCE:  Alert, " in no distress, cooperative, sitting in w/c   RESP:  respiratory effort and palpation of chest normal, lungs clear to auscultation , no respiratory distress, no cough  CV:  Palpation and auscultation of heart done , regular rate and rhythm HR 80's apical, no murmur, rub, or gallop, no edema today  ABDOMEN:  no guarding or rebound, bowel sounds normal, no organomegaly  M/S:   Gait and station abnormal primarily in w/c, weakness, impaired ROM hips and knees due to pain, bilateral knees with flexion contractures   NEURO:   speech clear, no tremormal strenghth and tone, PERRL, poor recall of events   Skin: no rashes or scratch marks noted today   PSYCH:  oriented X 2, very poor insight and judgement impaired, memory impaired , affect and mood flat     Recent labs in EPIC reviewed by me today.     Assessment/Plan:  (G30.1,  F02.80) Late onset Alzheimer's disease without behavioral disturbance (H)  (primary encounter diagnosis)  (F41.9) Anxiety   (F33.1) Moderate episode of recurrent major depressive disorder (H)  Comment: advancing dementia with anxious features, impaired language and now no longer ambulatory, refusing bathes, has improved some with ativan prior to bath   Plan: staff dispense meds and assist with cares  -ativan prior to bathes, dose increased to 1mg and this has been very helpful   -supportive family    (M89.49) Primary osteoarthritis involving multiple joints  (M25.551) Hip pain, right  (M25.561,  G89.29) Chronic pain of right knee  Comment: advanced OA to right hip and knees  -poor surgical candidate due to advancing dementia  -surgery for THIAGO for Feb was cancelled, unlikely will be rescheduled  Plan: icy hot, APAP, voltaren  -[facility] f/u prn    (L28.0) Neurodermatitis  Comment: poor hygiene and dry skin contribute  -ativan prior to bath has been very helpful, symptoms decreased with increased compliance with bathes  Plan: diphenhydrAMINE (BENADRYL) 2 % external cream        BID and every day  prn  -continue bathes and Aquaphor     (R00.0) Tachycardia  Comment: abnormal EKG from elevated HR noted fall 2021  -ziopatch results recently available, patient has some runs of v-tach, patient is unaware, discussed with Dr. Sue and no changes recommended   Plan: monitor    Electronically signed by: NADINE Rogers CNP             Sincerely,        NADINE Rogers CNP

## 2022-04-18 ENCOUNTER — LAB REQUISITION (OUTPATIENT)
Dept: LAB | Facility: CLINIC | Age: 87
End: 2022-04-18
Payer: MEDICARE

## 2022-04-18 DIAGNOSIS — I10 ESSENTIAL (PRIMARY) HYPERTENSION: ICD-10-CM

## 2022-04-18 DIAGNOSIS — D64.9 ANEMIA, UNSPECIFIED: ICD-10-CM

## 2022-04-19 LAB
ANION GAP SERPL CALCULATED.3IONS-SCNC: 5 MMOL/L (ref 3–14)
BUN SERPL-MCNC: 15 MG/DL (ref 7–30)
CALCIUM SERPL-MCNC: 8.9 MG/DL (ref 8.5–10.1)
CHLORIDE BLD-SCNC: 103 MMOL/L (ref 94–109)
CO2 SERPL-SCNC: 27 MMOL/L (ref 20–32)
CREAT SERPL-MCNC: 0.45 MG/DL (ref 0.52–1.04)
ERYTHROCYTE [DISTWIDTH] IN BLOOD BY AUTOMATED COUNT: 13.5 % (ref 10–15)
FERRITIN SERPL-MCNC: 30 NG/ML (ref 8–252)
FOLATE SERPL-MCNC: 45.7 NG/ML
GFR SERPL CREATININE-BSD FRML MDRD: >90 ML/MIN/1.73M2
GLUCOSE BLD-MCNC: 140 MG/DL (ref 70–99)
HCT VFR BLD AUTO: 39.4 % (ref 35–47)
HGB BLD-MCNC: 12.4 G/DL (ref 11.7–15.7)
MCH RBC QN AUTO: 28.6 PG (ref 26.5–33)
MCHC RBC AUTO-ENTMCNC: 31.5 G/DL (ref 31.5–36.5)
MCV RBC AUTO: 91 FL (ref 78–100)
PLATELET # BLD AUTO: 250 10E3/UL (ref 150–450)
POTASSIUM BLD-SCNC: 3.5 MMOL/L (ref 3.4–5.3)
RBC # BLD AUTO: 4.33 10E6/UL (ref 3.8–5.2)
SODIUM SERPL-SCNC: 135 MMOL/L (ref 133–144)
VIT B12 SERPL-MCNC: 1035 PG/ML (ref 193–986)
WBC # BLD AUTO: 3.8 10E3/UL (ref 4–11)

## 2022-04-19 PROCEDURE — 85027 COMPLETE CBC AUTOMATED: CPT | Mod: ORL | Performed by: NURSE PRACTITIONER

## 2022-04-19 PROCEDURE — P9604 ONE-WAY ALLOW PRORATED TRIP: HCPCS | Mod: ORL | Performed by: NURSE PRACTITIONER

## 2022-04-19 PROCEDURE — 82728 ASSAY OF FERRITIN: CPT | Mod: ORL | Performed by: NURSE PRACTITIONER

## 2022-04-19 PROCEDURE — 82607 VITAMIN B-12: CPT | Mod: ORL | Performed by: NURSE PRACTITIONER

## 2022-04-19 PROCEDURE — 80048 BASIC METABOLIC PNL TOTAL CA: CPT | Mod: ORL | Performed by: NURSE PRACTITIONER

## 2022-04-19 PROCEDURE — 82746 ASSAY OF FOLIC ACID SERUM: CPT | Mod: ORL | Performed by: NURSE PRACTITIONER

## 2022-04-19 PROCEDURE — 36415 COLL VENOUS BLD VENIPUNCTURE: CPT | Mod: ORL | Performed by: NURSE PRACTITIONER

## 2022-04-20 NOTE — ED NOTES
Pt's HR drop down to 20s-30s when patient becomes nauseated. MD aware.    Glycopyrrolate Pregnancy And Lactation Text: This medication is Pregnancy Category B and is considered safe during pregnancy. It is unknown if it is excreted breast milk.

## 2022-05-03 DIAGNOSIS — R23.8 SKIN BREAKDOWN: ICD-10-CM

## 2022-05-03 RX ORDER — ANORECTAL OINTMENT 15.7; .44; 24; 20.6 G/100G; G/100G; G/100G; G/100G
OINTMENT TOPICAL
Qty: 113 G | Refills: 97 | Status: SHIPPED | OUTPATIENT
Start: 2022-05-03 | End: 2023-01-01

## 2022-05-06 ENCOUNTER — LAB REQUISITION (OUTPATIENT)
Dept: LAB | Facility: CLINIC | Age: 87
End: 2022-05-06
Payer: MEDICARE

## 2022-05-06 DIAGNOSIS — U07.1 COVID-19: ICD-10-CM

## 2022-05-06 LAB — SARS-COV-2 RNA RESP QL NAA+PROBE: POSITIVE

## 2022-05-06 PROCEDURE — U0003 INFECTIOUS AGENT DETECTION BY NUCLEIC ACID (DNA OR RNA); SEVERE ACUTE RESPIRATORY SYNDROME CORONAVIRUS 2 (SARS-COV-2) (CORONAVIRUS DISEASE [COVID-19]), AMPLIFIED PROBE TECHNIQUE, MAKING USE OF HIGH THROUGHPUT TECHNOLOGIES AS DESCRIBED BY CMS-2020-01-R: HCPCS | Mod: ORL | Performed by: NURSE PRACTITIONER

## 2022-05-31 ENCOUNTER — ASSISTED LIVING VISIT (OUTPATIENT)
Dept: GERIATRICS | Facility: CLINIC | Age: 87
End: 2022-05-31
Payer: MEDICARE

## 2022-05-31 VITALS — BODY MASS INDEX: 21.83 KG/M2 | WEIGHT: 123.2 LBS | TEMPERATURE: 97.7 F | HEIGHT: 63 IN

## 2022-05-31 DIAGNOSIS — D64.9 ANEMIA, UNSPECIFIED TYPE: ICD-10-CM

## 2022-05-31 DIAGNOSIS — F02.80 LATE ONSET ALZHEIMER'S DISEASE WITHOUT BEHAVIORAL DISTURBANCE (H): ICD-10-CM

## 2022-05-31 DIAGNOSIS — M15.0 PRIMARY OSTEOARTHRITIS INVOLVING MULTIPLE JOINTS: ICD-10-CM

## 2022-05-31 DIAGNOSIS — G30.1 LATE ONSET ALZHEIMER'S DISEASE WITHOUT BEHAVIORAL DISTURBANCE (H): ICD-10-CM

## 2022-05-31 DIAGNOSIS — M25.551 HIP PAIN, RIGHT: ICD-10-CM

## 2022-05-31 DIAGNOSIS — F41.9 ANXIETY: ICD-10-CM

## 2022-05-31 DIAGNOSIS — I10 ESSENTIAL HYPERTENSION: ICD-10-CM

## 2022-05-31 DIAGNOSIS — R11.10 DRY HEAVES: ICD-10-CM

## 2022-05-31 DIAGNOSIS — L28.0 NEURODERMATITIS: ICD-10-CM

## 2022-05-31 DIAGNOSIS — L29.9 ITCHING: Primary | ICD-10-CM

## 2022-05-31 NOTE — PROGRESS NOTES
[facility] GERIATRICS    Chief Complaint   Patient presents with     RECHECK     HPI:  Edmundo James is a 86 year old  (1935), who is being seen today for an episodic care visit at: [facility] ([facility]) [[id number]]. Today's concern is:     Patient is an 86 year old female with  PHI significant for HTN, HLD, OA, depression, asthma, and dementia without official work-up.  She has extensive joint degeneration to her hips and knees and primarily w/c bound now.  Has been seen by [facility] for possible THIAGO.  Was initially planned to have surgery in February 2022 but with significant change in patient condition, surgery was cancelled.  She has struggled with anxiety/depressed mood but had significant hyponatremia with selective serotonin reuptake inhibitor, so this was discontinued and family not interested in trying other medication for mood given her reaction. She has orders for Ativan prior to bathes to help reduce her resistance to bath.  Per staff, works well depending on the staff that are on staff to assist her    She is seen today to follow-up on her co-morbidities.  Her  is present during visit.  She is poor historian.  She denies CP, shortness of breath, abdominal pain diarrhea or constipation. Reports pain is much better to hips and knees. Continues to struggle with pruritis, primarily to back, neck and upper arms.  She frequently is not bathed and spends a lot of time scratching.  Antihistamine was not effective so was discontinued, benadryl cream and aquaphor added with minimal effect.   also reports she has struggled with nausea and dry heaving after breakfast in past month.  Denies/no reports of abdominal pain or symptoms at other times during the day and symptoms resolved after morning.  She currently is taking morning meds on an empty stomach     Allergies, and PMH/PSH reviewed in [facility] today.  REVIEW OF SYSTEMS:  Limited secondary to cognitive impairment but as noted  "in HPI     Objective:   Temp 97.7  F (36.5  C)   Ht 1.6 m (5' 3\")   Wt 55.9 kg (123 lb 3.2 oz)   BMI 21.82 kg/m    GENERAL APPEARANCE:  Alert, in no distress, cooperative, sitting in w/c   RESP:  respiratory effort and palpation of chest normal, lungs clear to auscultation , no respiratory distress, no cough  CV:  Palpation and auscultation of heart done , regular rate and rhythm HR 70's apical, no murmur, rub, or gallop, no edema today  ABDOMEN:  no guarding or rebound, bowel sounds normal, no organomegaly, no pain to light and deep palpation   M/S:   Gait and station abnormal primarily in w/c and EZ stand for transfers, weakness, impaired ROM hips and knees due to pain, bilateral knees with flexion contractures   NEURO:   speech clear, PERRL, poor recall of events   Skin: scratch marks and scabs from scratching noted on back and neck  PSYCH:  oriented X 2, very poor insight and judgement impaired, memory impaired , affect and mood flat     Recent labs in EPIC reviewed by me today.     Assessment/Plan:  (L29.9) Itching  (primary encounter diagnosis)  (L28.0) Neurodermatitis  Comment: poor hygiene and dry skin contribute, thought to be neurodermatitis  -antihistamine ineffective   Plan: d'c benadryl cream, not effective  -continue to try encourage bathes, Aquaphor for dry skin   -try camphor-menthol (DERMASARRA) 0.5-0.5 % external        lotion            (G30.1,  F02.80) Late onset Alzheimer's disease without behavioral disturbance (H)  (F41.9) Anxiety  Comment: advancing dementia with anxious features, impaired language and now no longer ambulatory, refusing bathes, has improved some with ativan prior to bath   Plan: staff dispense meds and assist with cares  -ativan prior to bathes  -supportive family  -may benefit from trial of different antidepressant for management in future if family agrees to try     (M89.49) Primary osteoarthritis involving multiple joints  (M25.551) Hip pain, right  Comment: advanced OA " to right hip and knees  -poor surgical candidate due to advancing dementia  -surgery for THIAGO for Feb 2022 was cancelled  -pain well controlled per report  Plan: APAP, voltaren  -d'c icy hot, no using         (I10) Essential hypertension  Comment:   BP Readings from Last 6 Encounters:   04/11/22 128/70   02/21/22 (!) 70/44   02/01/22 135/83   01/11/22 122/70   12/21/21 100/60   12/13/21 (!) 145/93     Plan: adequate control given age and goals of care  -monitor BP and continue current meds     (R11.10) Dry heaves  Comment: as noted in HPI, occurs in morning only, denies other symptoms, question if r/t taking morning meds on empty stomach?  Plan: staff to give meds after breakfast, if continues with symptoms, consider trial of PPI     (D64.9) Anemia, unspecified type  Comment: per history, resolved  Lab Results   Component Value Date    HGB 12.4 04/19/2022       Plan: d'c B12 and folate  -periodic monitor Hgb     Electronically signed by: NADINE Rogers CNP

## 2022-05-31 NOTE — LETTER
5/31/2022        RE: Edmundo James  [address] [place] [address]        .  [facility] GERIATRICS    Chief Complaint   Patient presents with     RECHECK     HPI:  Edmundo James is a 86 year old  (1935), who is being seen today for an episodic care visit at: [facility] ([facility]) [[id number]]. Today's concern is:     Patient is an 86 year old female with  PHI significant for HTN, HLD, OA, depression, asthma, and dementia without official work-up.  She has extensive joint degeneration to her hips and knees and primarily w/c bound now.  Has been seen by [facility] for possible THIAGO.  Was initially planned to have surgery in February 2022 but with significant change in patient condition, surgery was cancelled.  She has struggled with anxiety/depressed mood but had significant hyponatremia with selective serotonin reuptake inhibitor, so this was discontinued and family not interested in trying other medication for mood given her reaction. She has orders for Ativan prior to bathes to help reduce her resistance to bath.  Per staff, works well depending on the staff that are on staff to assist her    She is seen today to follow-up on her co-morbidities.  Her  is present during visit.  She is poor historian.  She denies CP, shortness of breath, abdominal pain diarrhea or constipation. Reports pain is much better to hips and knees. Continues to struggle with pruritis, primarily to back, neck and upper arms.  She frequently is not bathed and spends a lot of time scratching.  Antihistamine was not effective so was discontinued, benadryl cream and aquaphor added with minimal effect.   also reports she has struggled with nausea and dry heaving after breakfast in past month.  Denies/no reports of abdominal pain or symptoms at other times during the day and symptoms resolved after morning.  She currently is taking morning meds on an empty stomach     Allergies, and PMH/PSH  "reviewed in [facility] today.  REVIEW OF SYSTEMS:  Limited secondary to cognitive impairment but as noted in HPI     Objective:   Temp 97.7  F (36.5  C)   Ht 1.6 m (5' 3\")   Wt 55.9 kg (123 lb 3.2 oz)   BMI 21.82 kg/m    GENERAL APPEARANCE:  Alert, in no distress, cooperative, sitting in w/c   RESP:  respiratory effort and palpation of chest normal, lungs clear to auscultation , no respiratory distress, no cough  CV:  Palpation and auscultation of heart done , regular rate and rhythm HR 70's apical, no murmur, rub, or gallop, no edema today  ABDOMEN:  no guarding or rebound, bowel sounds normal, no organomegaly, no pain to light and deep palpation   M/S:   Gait and station abnormal primarily in w/c and EZ stand for transfers, weakness, impaired ROM hips and knees due to pain, bilateral knees with flexion contractures   NEURO:   speech clear, PERRL, poor recall of events   Skin: scratch marks and scabs from scratching noted on back and neck  PSYCH:  oriented X 2, very poor insight and judgement impaired, memory impaired , affect and mood flat     Recent labs in [facility] reviewed by me today.     Assessment/Plan:  (L29.9) Itching  (primary encounter diagnosis)  (L28.0) Neurodermatitis  Comment: poor hygiene and dry skin contribute, thought to be neurodermatitis  -antihistamine ineffective   Plan: d'c benadryl cream, not effective  -continue to try encourage bathes, Aquaphor for dry skin   -try camphor-menthol (DERMASARRA) 0.5-0.5 % external        lotion            (G30.1,  F02.80) Late onset Alzheimer's disease without behavioral disturbance (H)  (F41.9) Anxiety  Comment: advancing dementia with anxious features, impaired language and now no longer ambulatory, refusing bathes, has improved some with ativan prior to bath   Plan: staff dispense meds and assist with cares  -ativan prior to bathes  -supportive family  -may benefit from trial of different antidepressant for management in future if family agrees to " try     (M89.49) Primary osteoarthritis involving multiple joints  (M25.551) Hip pain, right  Comment: advanced OA to right hip and knees  -poor surgical candidate due to advancing dementia  -surgery for THIAGO for Feb 2022 was cancelled  -pain well controlled per report  Plan: APAP, voltaren  -d'c icy hot, no using         (I10) Essential hypertension  Comment:   BP Readings from Last 6 Encounters:   04/11/22 128/70   02/21/22 (!) 70/44   02/01/22 135/83   01/11/22 122/70   12/21/21 100/60   12/13/21 (!) 145/93     Plan: adequate control given age and goals of care  -monitor BP and continue current meds     (R11.10) Dry heaves  Comment: as noted in HPI, occurs in morning only, denies other symptoms, question if r/t taking morning meds on empty stomach?  Plan: staff to give meds after breakfast, if continues with symptoms, consider trial of PPI     (D64.9) Anemia, unspecified type  Comment: per history, resolved  Lab Results   Component Value Date    HGB 12.4 04/19/2022       Plan: d'c B12 and folate  -periodic monitor Hgb     Electronically signed by: NADINE Rogers CNP             Sincerely,        NADINE Rogers CNP

## 2022-07-05 ENCOUNTER — ASSISTED LIVING VISIT (OUTPATIENT)
Dept: GERIATRICS | Facility: CLINIC | Age: 87
End: 2022-07-05
Payer: MEDICARE

## 2022-07-05 VITALS
DIASTOLIC BLOOD PRESSURE: 87 MMHG | TEMPERATURE: 97.6 F | OXYGEN SATURATION: 98 % | SYSTOLIC BLOOD PRESSURE: 137 MMHG | HEIGHT: 63 IN | WEIGHT: 123 LBS | BODY MASS INDEX: 21.79 KG/M2 | HEART RATE: 82 BPM

## 2022-07-05 DIAGNOSIS — M15.0 PRIMARY OSTEOARTHRITIS INVOLVING MULTIPLE JOINTS: ICD-10-CM

## 2022-07-05 DIAGNOSIS — G30.1 LATE ONSET ALZHEIMER'S DISEASE WITHOUT BEHAVIORAL DISTURBANCE (H): ICD-10-CM

## 2022-07-05 DIAGNOSIS — F02.80 LATE ONSET ALZHEIMER'S DISEASE WITHOUT BEHAVIORAL DISTURBANCE (H): ICD-10-CM

## 2022-07-05 DIAGNOSIS — L28.0 NEURODERMATITIS: Primary | ICD-10-CM

## 2022-07-05 DIAGNOSIS — R11.10 DRY HEAVES: ICD-10-CM

## 2022-07-05 DIAGNOSIS — F41.9 ANXIETY: ICD-10-CM

## 2022-07-05 DIAGNOSIS — D64.9 ANEMIA, UNSPECIFIED TYPE: ICD-10-CM

## 2022-07-05 DIAGNOSIS — I10 ESSENTIAL HYPERTENSION: ICD-10-CM

## 2022-07-05 RX ORDER — LORATADINE 10 MG/1
10 TABLET ORAL DAILY
Qty: 30 TABLET | Refills: 3 | Status: SHIPPED | OUTPATIENT
Start: 2022-07-05 | End: 2022-09-16

## 2022-07-05 RX ORDER — ACETAMINOPHEN 500 MG
TABLET ORAL
Qty: 168 TABLET | Refills: 11
Start: 2022-07-05 | End: 2022-09-16

## 2022-07-05 NOTE — LETTER
7/5/2022        RE: Edmundo James  [id number] [place] [address]        [facility] GERIATRICS    Chief Complaint   Patient presents with     RECHECK     HPI:  Edmundo James is a 86 year old  (1935), who is being seen today for an episodic care visit at: [facility] [[id number]]. Today's concern is:     Patient is an 86 year old female with  PHI significant for HTN, HLD, OA, depression, asthma, and dementia without official work-up.  She has extensive joint degeneration to her hips and knees and primarily w/c bound now.  Has been seen by [facility] for possible THIAGO.  Was initially planned to have surgery in February 2022 but with significant change in patient condition, surgery was cancelled.  She has struggled with anxiety/depressed mood but had significant hyponatremia with selective serotonin reuptake inhibitor, so this was discontinued and family not interested in trying other medication for mood given her reaction. She has orders for Ativan prior to bathes to help reduce her resistance to bath.  Per staff, works well depending on the staff that are on staff to assist her     She is seen today to follow-up on her co-morbidities.  Her  is present during visit and helps with much of the history.  Her main concern is ongoing itching.  She scratches her arms, upper back and upper thighs.  Does not get a shower as often as she should given her resistance to them.  We stopped her Zyrtec previously as she did not think it helped, but patient and  think it might have helped some and interested in restarting.  Sarna cream ordered TID but they report she is not getting it that frequently.  She denies/no reports of CP, shortness of breath dizziness.lightheadedness, abdominal pain, n/v.  She reported dry heaves at our last visit and morning meds changed to be given after breakfast, which has resolved this issue per her report.  Denies any pain, no recent falls  "reported     Allergies, and PMH/PSH reviewed in [facility] today.  REVIEW OF SYSTEMS:  Limited secondary to cognitive impairment but as noted in HPI     Objective:   /87   Pulse 82   Temp 97.6  F (36.4  C)   Ht 1.6 m (5' 3\")   Wt 55.8 kg (123 lb)   SpO2 98%   BMI 21.79 kg/m    GENERAL APPEARANCE:  Alert, in no distress, cooperative  RESP:  respiratory effort and palpation of chest normal, lungs clear to auscultation , no respiratory distress  CV:  Palpation and auscultation of heart done , regular rate and rhythm, no murmur, rub, or gallop, no edema  ABDOMEN:  no guarding or rebound, bowel sounds normal, no masses  M/S:   Gait and station abnormal w/c bound and EZ stand for transfers, impaired ROM to bilateral hips and kness, bilateral kness with flexion contractures, kyphosis,   SKIN:  scatch marks and small scabs from scratching noted to upper back, neck, bilateral arms, no s/s infection   NEURO:   speech clear no facial asymmetry, normal strength and tone, no focal findings   PSYCH:  oriented to person and place, memory impaired , affect and mood normal    Recent labs in [facility] reviewed by me today.     Assessment/Plan:  (L28.0) Neurodermatitis  (primary encounter diagnosis)  Comment: poor hygiene and dry skin contribute  -think antihistamine was helpful  Plan: try different antihistamine, loratadine (CLARITIN) 10 MG tablet at bedtime  -staff informed to apply sarna lotion TID  -encourage hygiene and Aquaphor for dry skin  -derm referral, hand written and given to patient so can go to desired location            (M89.49) Primary osteoarthritis involving multiple joints  Comment: advanced OA, pain has been an issue in the past but denies pain, w/c bound and very sedentary   Plan: decrease APAP from TID to BID and monitor  -surgery for THIAGO cancelled Feb 2022 given advancing dementia    (G30.1,  F02.80) Late onset Alzheimer's disease without behavioral disturbance (H)  (F41.9) Anxiety  Comment: advancing " dementia with anxious features, impaired language and now no longer ambulatory, refusing bathes, has improved some with ativan prior to bath   Plan: staff dispense meds and assist with cares  -ativan prior to bathes  -supportive family  -may benefit from trial of different antidepressant for management in future if family agrees to try    (I10) Essential hypertension  Comment:   BP Readings from Last 3 Encounters:   07/05/22 137/87   04/11/22 128/70   02/21/22 (!) 70/44     Plan: adequate control given age and goals of care  -monitor BP and continue current meds     (R11.10) Dry heaves  Comment: reported at my last visit with her, changed morning meds to be given after breakfast  -symptoms resolved  Plan: monitor     (D64.9) Anemia, unspecified type  Comment:   Lab Results   Component Value Date    HGB 12.4 04/19/2022   -B12 and folate d'cd at last visit     Plan: CBC next week     Electronically signed by: NADINE Rogers CNP             Sincerely,        NADINE Rogers CNP

## 2022-07-05 NOTE — PROGRESS NOTES
[facility] GERIATRICS    Chief Complaint   Patient presents with     RECHECK     HPI:  Edmundo James is a 86 year old  (1935), who is being seen today for an episodic care visit at: [facility] ([facility]) [[id number]]. Today's concern is:     Patient is an 86 year old female with  PHI significant for HTN, HLD, OA, depression, asthma, and dementia without official work-up.  She has extensive joint degeneration to her hips and knees and primarily w/c bound now.  Has been seen by [facility] for possible THIAGO.  Was initially planned to have surgery in February 2022 but with significant change in patient condition, surgery was cancelled.  She has struggled with anxiety/depressed mood but had significant hyponatremia with selective serotonin reuptake inhibitor, so this was discontinued and family not interested in trying other medication for mood given her reaction. She has orders for Ativan prior to bathes to help reduce her resistance to bath.  Per staff, works well depending on the staff that are on staff to assist her     She is seen today to follow-up on her co-morbidities.  Her  is present during visit and helps with much of the history.  Her main concern is ongoing itching.  She scratches her arms, upper back and upper thighs.  Does not get a shower as often as she should given her resistance to them.  We stopped her Zyrtec previously as she did not think it helped, but patient and  think it might have helped some and interested in restarting.  Sarna cream ordered TID but they report she is not getting it that frequently.  She denies/no reports of CP, shortness of breath dizziness.lightheadedness, abdominal pain, n/v.  She reported dry heaves at our last visit and morning meds changed to be given after breakfast, which has resolved this issue per her report.  Denies any pain, no recent falls reported     Allergies, and PMH/PSH reviewed in [employer] today.  REVIEW OF SYSTEMS:  Limited  "secondary to cognitive impairment but as noted in HPI     Objective:   /87   Pulse 82   Temp 97.6  F (36.4  C)   Ht 1.6 m (5' 3\")   Wt 55.8 kg (123 lb)   SpO2 98%   BMI 21.79 kg/m    GENERAL APPEARANCE:  Alert, in no distress, cooperative  RESP:  respiratory effort and palpation of chest normal, lungs clear to auscultation , no respiratory distress  CV:  Palpation and auscultation of heart done , regular rate and rhythm, no murmur, rub, or gallop, no edema  ABDOMEN:  no guarding or rebound, bowel sounds normal, no masses  M/S:   Gait and station abnormal w/c bound and EZ stand for transfers, impaired ROM to bilateral hips and kness, bilateral kness with flexion contractures, kyphosis,   SKIN:  scatch marks and small scabs from scratching noted to upper back, neck, bilateral arms, no s/s infection   NEURO:   speech clear no facial asymmetry, normal strength and tone, no focal findings   PSYCH:  oriented to person and place, memory impaired , affect and mood normal    Recent labs in [facility] reviewed by me today.     Assessment/Plan:  (L28.0) Neurodermatitis  (primary encounter diagnosis)  Comment: poor hygiene and dry skin contribute  -think antihistamine was helpful  Plan: try different antihistamine, loratadine (CLARITIN) 10 MG tablet at bedtime  -staff informed to apply sarna lotion TID  -encourage hygiene and Aquaphor for dry skin  -derm referral, hand written and given to patient so can go to desired location            (M89.49) Primary osteoarthritis involving multiple joints  Comment: advanced OA, pain has been an issue in the past but denies pain, w/c bound and very sedentary   Plan: decrease APAP from TID to BID and monitor  -surgery for THIAGO cancelled Feb 2022 given advancing dementia    (G30.1,  F02.80) Late onset Alzheimer's disease without behavioral disturbance (H)  (F41.9) Anxiety  Comment: advancing dementia with anxious features, impaired language and now no longer ambulatory, refusing bathes, " has improved some with ativan prior to bath   Plan: staff dispense meds and assist with cares  -ativan prior to bathes  -supportive family  -may benefit from trial of different antidepressant for management in future if family agrees to try    (I10) Essential hypertension  Comment:   BP Readings from Last 3 Encounters:   07/05/22 137/87   04/11/22 128/70   02/21/22 (!) 70/44     Plan: adequate control given age and goals of care  -monitor BP and continue current meds     (R11.10) Dry heaves  Comment: reported at my last visit with her, changed morning meds to be given after breakfast  -symptoms resolved  Plan: monitor     (D64.9) Anemia, unspecified type  Comment:   Lab Results   Component Value Date    HGB 12.4 04/19/2022   -B12 and folate d'cd at last visit     Plan: CBC next week     Electronically signed by: NADINE Rogers CNP

## 2022-07-11 ENCOUNTER — LAB REQUISITION (OUTPATIENT)
Dept: LAB | Facility: CLINIC | Age: 87
End: 2022-07-11
Payer: MEDICARE

## 2022-07-11 DIAGNOSIS — D64.9 ANEMIA, UNSPECIFIED: ICD-10-CM

## 2022-07-12 LAB
ERYTHROCYTE [DISTWIDTH] IN BLOOD BY AUTOMATED COUNT: 14.6 % (ref 10–15)
HCT VFR BLD AUTO: 38.4 % (ref 35–47)
HGB BLD-MCNC: 12.2 G/DL (ref 11.7–15.7)
MCH RBC QN AUTO: 28.6 PG (ref 26.5–33)
MCHC RBC AUTO-ENTMCNC: 31.8 G/DL (ref 31.5–36.5)
MCV RBC AUTO: 90 FL (ref 78–100)
PLATELET # BLD AUTO: 300 10E3/UL (ref 150–450)
RBC # BLD AUTO: 4.26 10E6/UL (ref 3.8–5.2)
WBC # BLD AUTO: 6.5 10E3/UL (ref 4–11)

## 2022-07-12 PROCEDURE — 85027 COMPLETE CBC AUTOMATED: CPT | Mod: ORL | Performed by: NURSE PRACTITIONER

## 2022-07-12 PROCEDURE — 36415 COLL VENOUS BLD VENIPUNCTURE: CPT | Mod: ORL | Performed by: NURSE PRACTITIONER

## 2022-07-12 PROCEDURE — P9604 ONE-WAY ALLOW PRORATED TRIP: HCPCS | Mod: ORL | Performed by: NURSE PRACTITIONER

## 2022-08-02 DIAGNOSIS — L29.9 ITCHING: Primary | ICD-10-CM

## 2022-08-02 RX ORDER — HYDROXYZINE HYDROCHLORIDE 25 MG/1
12.5 TABLET, FILM COATED ORAL 2 TIMES DAILY
Qty: 60 TABLET | Refills: 3 | Status: SHIPPED | OUTPATIENT
Start: 2022-08-02 | End: 2022-08-09

## 2022-08-09 DIAGNOSIS — L29.9 ITCHING: ICD-10-CM

## 2022-08-09 RX ORDER — HYDROXYZINE HYDROCHLORIDE 25 MG/1
25 TABLET, FILM COATED ORAL 2 TIMES DAILY
Qty: 60 TABLET | Refills: 3 | Status: SHIPPED | OUTPATIENT
Start: 2022-08-09 | End: 2022-10-13

## 2022-09-01 NOTE — PROGRESS NOTES
"[facility] GERIATRICS    Chief Complaint   Patient presents with     RECHECK     HPI:  Edmundo James is a 86 year old  (1935), who is being seen today for an episodic care visit at: [facility]) [[id number]]. Today's concern is:     Patient is an 86 year old female with  PHI significant for HTN, HLD, OA, depression, asthma, and dementia without official work-up.  She has extensive joint degeneration to her hips and knees and primarily w/c bound now.  Has been seen by [facility] for possible THIAGO.  Was initially planned to have surgery in February 2022 but with significant change in patient condition, surgery was cancelled.  She has struggled with anxiety/depressed mood but had significant hyponatremia with selective serotonin reuptake inhibitor, so this was discontinued and family not interested in trying other medication for mood given her reaction. She has orders for Ativan prior to bathes to help reduce her resistance to bath.  However, recently, she has declined showers all together and is doing sponge bathes weekly and tolerating ok without Ativan     She is seen today to follow-up on her co-morbidities.  Her  is present during visit and helps with much of the history.  Continues to struggle with itching, scratching herself raw, she has a referral to derm, but given her advanced dementia, hard to get to apt.  Family reports itching is \"driving her mad\" so hydroxyzine was started low dose and has been gradually increased.  Patient/family report this has helped with itching and she denies being more tired during the day, no falls reported (non-ambulatory and has not attempted to self transfer) no increased confusion reported.  She denies CP, shortness of breath, dizziness or lightheadedness.  Reports bowels loose at times.  She denies pain today.  No other concerns per  or staff today    Allergies, and PMH/PSH reviewed in [employer] today.  REVIEW OF SYSTEMS:  Limited secondary " "to cognitive impairment but as noted in HPI    Objective:   /81   Pulse 74   Temp 98.1  F (36.7  C)   Resp 18   Ht 1.6 m (5' 3\")   Wt 62.9 kg (138 lb 11.2 oz)   SpO2 98%   BMI 24.57 kg/m    GENERAL APPEARANCE:  Alert, in no distress, cooperative, room and patient smell or urine   RESP:  respiratory effort and palpation of chest normal, lungs clear to auscultation , no respiratory distress  CV:  Palpation and auscultation of heart done , regular rate and rhythm, no murmur, rub, or gallop, no edema  ABDOMEN:  no guarding or rebound, bowel sounds normal, no masses  M/S:   Gait and station abnormal w/c bound and EZ stand for transfers, impaired ROM to bilateral hips and kness, bilateral kness with flexion contractures, kyphosis,   SKIN:  can seen scratch marks to upper back and neck, improved from last visit, no open scabs or open areas noted today  NEURO:   speech clear no facial asymmetry, normal strength and tone, no focal findings   PSYCH:  oriented to person and place, memory impaired , affect and mood normal, very little insight and judgement into her situation, poor historian        Recent labs in [facility] reviewed by me today.     Assessment/Plan:  (G30.1,  F02.80) Late onset Alzheimer's disease without behavioral disturbance (H)  (primary encounter diagnosis)  (F41.9) Anxiety  Comment: progressive, impaired language and no longer ambulatory, very poor insight into her situation anxious features at times, resistant to hygiene cares but has been agreeable to sponge bathes  Plan: staff assist with cares and dispense meds  -d'c prn Ativan, no longer doing showers and tolerating sponge bathes       (L28.0) Neurodermatitis  (L29.9) Itching  Comment: poor hygiene and dry skin contribute  -think antihistamine was helpful  Plan: continue loratadine (CLARITIN) 10 MG tablet at bedtime- itching improved from last vist  -staff informed to apply sarna lotion TID  -encourage hygiene, sponge bath at least 2X's per " week  -continue hydroxyzine BID at this time, tolerating ok and improved QOL, did not respond to other measures tried   -derm referral, hand written and given to patient so can go to desired location if they are able to get transport to [facility]    (M89.49) Primary osteoarthritis involving multiple joints  Comment: advanced OA, pain has been an issue in the past but denies pain, w/c bound and very sedentary   Plan: continue APAP and voltaren gel  BID and monitor  -surgery for THIAGO cancelled Feb 2022 given advancing dementia    (I10) Essential hypertension  Comment:   BP Readings from Last 3 Encounters:   09/06/22 136/81   07/05/22 137/87   04/11/22 128/70   -BP controlled given age and goals of care  Plan: continue volsartan and monitor   -BMP next week       (D64.9) Anemia, unspecified type  Comment:   Lab Results   Component Value Date    HGB 12.2 07/12/2022      -no reports of acute blood loss   Plan: periodic monitor CBC    Electronically signed by: NADINE Rogers CNP

## 2022-09-06 ENCOUNTER — ASSISTED LIVING VISIT (OUTPATIENT)
Dept: GERIATRICS | Facility: CLINIC | Age: 87
End: 2022-09-06
Payer: MEDICARE

## 2022-09-06 VITALS
DIASTOLIC BLOOD PRESSURE: 81 MMHG | BODY MASS INDEX: 24.57 KG/M2 | HEIGHT: 63 IN | SYSTOLIC BLOOD PRESSURE: 136 MMHG | HEART RATE: 74 BPM | OXYGEN SATURATION: 98 % | RESPIRATION RATE: 18 BRPM | WEIGHT: 138.7 LBS | TEMPERATURE: 98.1 F

## 2022-09-06 DIAGNOSIS — M25.551 HIP PAIN, RIGHT: ICD-10-CM

## 2022-09-06 DIAGNOSIS — L29.9 ITCHING: ICD-10-CM

## 2022-09-06 DIAGNOSIS — G30.1 LATE ONSET ALZHEIMER'S DISEASE WITHOUT BEHAVIORAL DISTURBANCE (H): Primary | ICD-10-CM

## 2022-09-06 DIAGNOSIS — D64.9 ANEMIA, UNSPECIFIED TYPE: ICD-10-CM

## 2022-09-06 DIAGNOSIS — I10 ESSENTIAL HYPERTENSION: ICD-10-CM

## 2022-09-06 DIAGNOSIS — M15.0 PRIMARY OSTEOARTHRITIS INVOLVING MULTIPLE JOINTS: ICD-10-CM

## 2022-09-06 DIAGNOSIS — L28.0 NEURODERMATITIS: ICD-10-CM

## 2022-09-06 DIAGNOSIS — F41.9 ANXIETY: ICD-10-CM

## 2022-09-06 DIAGNOSIS — R26.9 IMPAIRED GAIT: ICD-10-CM

## 2022-09-06 DIAGNOSIS — F02.80 LATE ONSET ALZHEIMER'S DISEASE WITHOUT BEHAVIORAL DISTURBANCE (H): Primary | ICD-10-CM

## 2022-09-06 NOTE — LETTER
"    9/6/2022        RE: Edmundo James  [address] [place] [address]        [facility] GERIATRICS    Chief Complaint   Patient presents with     RECHECK     HPI:  Edmundo James is a 86 year old  (1935), who is being seen today for an episodic care visit at: [facility] ([facility]) [[id number]]. Today's concern is:     Patient is an 86 year old female with  PHI significant for HTN, HLD, OA, depression, asthma, and dementia without official work-up.  She has extensive joint degeneration to her hips and knees and primarily w/c bound now.  Has been seen by [facility] for possible THIAGO.  Was initially planned to have surgery in February 2022 but with significant change in patient condition, surgery was cancelled.  She has struggled with anxiety/depressed mood but had significant hyponatremia with selective serotonin reuptake inhibitor, so this was discontinued and family not interested in trying other medication for mood given her reaction. She has orders for Ativan prior to bathes to help reduce her resistance to bath.  However, recently, she has declined showers all together and is doing sponge bathes weekly and tolerating ok without Ativan     She is seen today to follow-up on her co-morbidities.  Her  is present during visit and helps with much of the history.  Continues to struggle with itching, scratching herself raw, she has a referral to derm, but given her advanced dementia, hard to get to apt.  Family reports itching is \"driving her mad\" so hydroxyzine was started low dose and has been gradually increased.  Patient/family report this has helped with itching and she denies being more tired during the day, no falls reported (non-ambulatory and has not attempted to self transfer) no increased confusion reported.  She denies CP, shortness of breath, dizziness or lightheadedness.  Reports bowels loose at times.  She denies pain today.  No other concerns per  or staff " "today    Allergies, and PMH/PSH reviewed in [facility] today.  REVIEW OF SYSTEMS:  Limited secondary to cognitive impairment but as noted in HPI    Objective:   /81   Pulse 74   Temp 98.1  F (36.7  C)   Resp 18   Ht 1.6 m (5' 3\")   Wt 62.9 kg (138 lb 11.2 oz)   SpO2 98%   BMI 24.57 kg/m    GENERAL APPEARANCE:  Alert, in no distress, cooperative, room and patient smell or urine   RESP:  respiratory effort and palpation of chest normal, lungs clear to auscultation , no respiratory distress  CV:  Palpation and auscultation of heart done , regular rate and rhythm, no murmur, rub, or gallop, no edema  ABDOMEN:  no guarding or rebound, bowel sounds normal, no masses  M/S:   Gait and station abnormal w/c bound and EZ stand for transfers, impaired ROM to bilateral hips and kness, bilateral kness with flexion contractures, kyphosis,   SKIN:  can seen scratch marks to upper back and neck, improved from last visit, no open scabs or open areas noted today  NEURO:   speech clear no facial asymmetry, normal strength and tone, no focal findings   PSYCH:  oriented to person and place, memory impaired , affect and mood normal, very little insight and judgement into her situation, poor historian        Recent labs in [facility] reviewed by me today.     Assessment/Plan:  (G30.1,  F02.80) Late onset Alzheimer's disease without behavioral disturbance (H)  (primary encounter diagnosis)  (F41.9) Anxiety  Comment: progressive, impaired language and no longer ambulatory, very poor insight into her situation anxious features at times, resistant to hygiene cares but has been agreeable to sponge bathes  Plan: staff assist with cares and dispense meds  -d'c prn Ativan, no longer doing showers and tolerating sponge bathes       (L28.0) Neurodermatitis  (L29.9) Itching  Comment: poor hygiene and dry skin contribute  -think antihistamine was helpful  Plan: continue loratadine (CLARITIN) 10 MG tablet at bedtime- itching improved from last " vist  -staff informed to apply sarna lotion TID  -encourage hygiene, sponge bath at least 2X's per week  -continue hydroxyzine BID at this time, tolerating ok and improved QOL, did not respond to other measures tried   -derm referral, hand written and given to patient so can go to desired location if they are able to get transport to [facility]    (M89.49) Primary osteoarthritis involving multiple joints  Comment: advanced OA, pain has been an issue in the past but denies pain, w/c bound and very sedentary   Plan: continue APAP and voltaren gel  BID and monitor  -surgery for THIAGO cancelled Feb 2022 given advancing dementia    (I10) Essential hypertension  Comment:   BP Readings from Last 3 Encounters:   09/06/22 136/81   07/05/22 137/87   04/11/22 128/70   -BP controlled given age and goals of care  Plan: continue volsartan and monitor   -BMP next week       (D64.9) Anemia, unspecified type  Comment:   Lab Results   Component Value Date    HGB 12.2 07/12/2022      -no reports of acute blood loss   Plan: periodic monitor CBC    Electronically signed by: NADINE Rogers CNP             Sincerely,        NADINE Rogers CNP

## 2022-09-12 ENCOUNTER — LAB REQUISITION (OUTPATIENT)
Dept: LAB | Facility: CLINIC | Age: 87
End: 2022-09-12
Payer: MEDICARE

## 2022-09-12 DIAGNOSIS — I10 ESSENTIAL (PRIMARY) HYPERTENSION: ICD-10-CM

## 2022-09-12 DIAGNOSIS — E55.9 VITAMIN D DEFICIENCY, UNSPECIFIED: ICD-10-CM

## 2022-09-13 LAB
ANION GAP SERPL CALCULATED.3IONS-SCNC: 10 MMOL/L (ref 7–15)
BUN SERPL-MCNC: 10.3 MG/DL (ref 8–23)
CALCIUM SERPL-MCNC: 9.1 MG/DL (ref 8.8–10.2)
CHLORIDE SERPL-SCNC: 99 MMOL/L (ref 98–107)
CREAT SERPL-MCNC: 0.52 MG/DL (ref 0.51–0.95)
DEPRECATED CALCIDIOL+CALCIFEROL SERPL-MC: 41 UG/L (ref 20–75)
DEPRECATED HCO3 PLAS-SCNC: 28 MMOL/L (ref 22–29)
GFR SERPL CREATININE-BSD FRML MDRD: >90 ML/MIN/1.73M2
GLUCOSE SERPL-MCNC: 78 MG/DL (ref 70–99)
POTASSIUM SERPL-SCNC: 3.5 MMOL/L (ref 3.4–5.3)
SODIUM SERPL-SCNC: 137 MMOL/L (ref 136–145)

## 2022-09-13 PROCEDURE — 82306 VITAMIN D 25 HYDROXY: CPT | Mod: ORL | Performed by: NURSE PRACTITIONER

## 2022-09-13 PROCEDURE — P9604 ONE-WAY ALLOW PRORATED TRIP: HCPCS | Mod: ORL | Performed by: NURSE PRACTITIONER

## 2022-09-13 PROCEDURE — 36415 COLL VENOUS BLD VENIPUNCTURE: CPT | Mod: ORL | Performed by: NURSE PRACTITIONER

## 2022-09-13 PROCEDURE — 80048 BASIC METABOLIC PNL TOTAL CA: CPT | Mod: ORL | Performed by: NURSE PRACTITIONER

## 2022-10-31 NOTE — PROGRESS NOTES
[facility] GERIATRICS    Chief Complaint   Patient presents with     RECHECK     HPI:  Edmundo James is a 86 year old  (1935), who is being seen today for an episodic care visit at: [facility][note 2] ([facility]) [[id number]]. Today's concern is:     Patient is an 86 year old female with  PHI significant for HTN, HLD, OA, depression, asthma, and dementia without official work-up.  She has extensive joint degeneration to her hips and knees and primarily w/c bound now.  Has been seen by [facility] for possible THIAGO.  Was initially planned to have surgery in February 2022 but with significant change in patient condition, surgery was cancelled.  She has struggled with anxiety/depressed mood but had significant hyponatremia with selective serotonin reuptake inhibitor, so this was discontinued and family not interested in trying other medication for mood given her reaction. She has orders for Ativan prior to bathes to help reduce her resistance to bath.  However, recently, she has declined showers all together and is doing sponge bathes weekly and tolerating ok without Ativan     She is seen today to follow-up on her co-morbidities.  Her  is present during visit and helps with much of the history. Her itching has been much improved with hydroxyzine and anti-itch lotion use.  She has much less scratch marks noted today and her  reports it has been better.  She was finally able to get a dermatology apt and it is for the end of November.  She denies CP, shortness of breath.  Overall, pain well controlled and sleeping well, appetite ok.  Denies constipation or diarrhea, GERD.  She has been having some dizziness with position changes recently.  She admits she does not drink a lot of water.  She denies other symptoms with dizziness     Allergies, and PMH/PSH reviewed in [facility] today.  REVIEW OF SYSTEMS:  Limited secondary to cognitive impairment but as noted in HPI    Objective:   /82   Pulse 69    "Temp 97.1  F (36.2  C)   Resp 18   Ht 1.6 m (5' 3\")   Wt 61.7 kg (136 lb)   BMI 24.09 kg/m    GENERAL APPEARANCE:  Alert, in no distress, cooperative, hygiene improved from my last visit  RESP:  respiratory effort and palpation of chest normal, lungs clear to auscultation , no respiratory distress  CV:  Palpation and auscultation of heart done , regular rate and rhythm, no murmur, rub, or gallop, no edema  ABDOMEN:  no guarding or rebound, bowel sounds normal, no masses  M/S:   Gait and station abnormal w/c bound and EZ stand for transfers, impaired ROM to bilateral hips and kness, bilateral kness with flexion contractures, kyphosis,   SKIN:  no open scabs or scratch marks noted today  NEURO:   speech clear no facial asymmetry, normal strength and tone, no focal findings   PSYCH:  oriented to person and place, memory impaired , affect and mood normal, very little insight and judgement into her situation, poor historian     Recent labs in [facility] reviewed by me today.     Assessment/Plan:  (G30.1,  F02.80) Late onset Alzheimer's disease without behavioral disturbance (H)  (primary encounter diagnosis)  (F41.9) Anxiety  Comment: progressive, impaired language and no longer ambulatory, very poor insight into her situation anxious features at times, resistant to hygiene cares but has been agreeable to sponge bathes and improvement in hygiene noted.  Her anxiety also is improved with hydroxyzine and this has improved itching as well  Plan: staff assist with cares and dispense meds  -hydroxyzine for anxiety and itching: multiple other modalities tried without success and this has improved her QOL and she has tolerated ok.  No increased confusion or lethargy noted    (I10) Hypertension, unspecified type  (R42) Dizziness  Comment: recent reports of dizziness with position changes  -given age and goals of care goal SBP <150  -BP today at visit was 121/88, below goal  Plan: decrease valsartan to 60mg po every day  -staff " obtain orthostatic BP's X 1 week and update me  -encourage fluids and slow position changes            (L28.0) Neurodermatitis  Comment: improved with sponge bathes and hydroxyzine, anti-itch lotion, claritine  Plan: continue above and derm apt later this month    (M15.9) Primary osteoarthritis involving multiple joints  Comment: advanced OA, pain has been an issue in the past but denies pain, w/c bound and very sedentary   Plan: continue APAP and voltaren gel  BID and monitor  -surgery for THIAGO cancelled Feb 2022 given advancing dementia      (D64.9) Anemia, unspecified type  Comment:   Lab Results   Component Value Date    HGB 12.2 07/12/2022     -no acute s/s blood loss  Plan: monitor Hgb prn       MED REC REQUIRED{Post Medication Reconciliation Status:  Patient was not discharged from an inpatient facility or TCU    Electronically signed by: NADINE Rogers CNP

## 2022-11-01 ENCOUNTER — ASSISTED LIVING VISIT (OUTPATIENT)
Dept: GERIATRICS | Facility: CLINIC | Age: 87
End: 2022-11-01
Payer: MEDICARE

## 2022-11-01 VITALS
HEART RATE: 69 BPM | SYSTOLIC BLOOD PRESSURE: 132 MMHG | WEIGHT: 136 LBS | DIASTOLIC BLOOD PRESSURE: 82 MMHG | RESPIRATION RATE: 18 BRPM | HEIGHT: 63 IN | TEMPERATURE: 97.1 F | BODY MASS INDEX: 24.1 KG/M2

## 2022-11-01 DIAGNOSIS — G30.1 LATE ONSET ALZHEIMER'S DISEASE WITHOUT BEHAVIORAL DISTURBANCE (H): Primary | ICD-10-CM

## 2022-11-01 DIAGNOSIS — D64.9 ANEMIA, UNSPECIFIED TYPE: ICD-10-CM

## 2022-11-01 DIAGNOSIS — I10 ESSENTIAL HYPERTENSION: ICD-10-CM

## 2022-11-01 DIAGNOSIS — I10 HYPERTENSION, UNSPECIFIED TYPE: ICD-10-CM

## 2022-11-01 DIAGNOSIS — M15.0 PRIMARY OSTEOARTHRITIS INVOLVING MULTIPLE JOINTS: ICD-10-CM

## 2022-11-01 DIAGNOSIS — F02.80 LATE ONSET ALZHEIMER'S DISEASE WITHOUT BEHAVIORAL DISTURBANCE (H): Primary | ICD-10-CM

## 2022-11-01 DIAGNOSIS — R42 DIZZINESS: ICD-10-CM

## 2022-11-01 DIAGNOSIS — F41.9 ANXIETY: ICD-10-CM

## 2022-11-01 DIAGNOSIS — L28.0 NEURODERMATITIS: ICD-10-CM

## 2022-11-01 RX ORDER — VALSARTAN 40 MG/1
60 TABLET ORAL DAILY
Qty: 30 TABLET | Refills: 3 | Status: SHIPPED | OUTPATIENT
Start: 2022-11-01 | End: 2022-12-12

## 2022-11-01 NOTE — LETTER
11/1/2022        RE: Edmundo James  [address] [place] [address]        [facility] GERIATRICS    Chief Complaint   Patient presents with     RECHECK     HPI:  Edmundo James is a 86 year old  (1935), who is being seen today for an episodic care visit at: [facility] ([facility]) [[id number]]. Today's concern is:     Patient is an 86 year old female with  PHI significant for HTN, HLD, OA, depression, asthma, and dementia without official work-up.  She has extensive joint degeneration to her hips and knees and primarily w/c bound now.  Has been seen by [facility] for possible THIAGO.  Was initially planned to have surgery in February 2022 but with significant change in patient condition, surgery was cancelled.  She has struggled with anxiety/depressed mood but had significant hyponatremia with selective serotonin reuptake inhibitor, so this was discontinued and family not interested in trying other medication for mood given her reaction. She has orders for Ativan prior to bathes to help reduce her resistance to bath.  However, recently, she has declined showers all together and is doing sponge bathes weekly and tolerating ok without Ativan     She is seen today to follow-up on her co-morbidities.  Her  is present during visit and helps with much of the history. Her itching has been much improved with hydroxyzine and anti-itch lotion use.  She has much less scratch marks noted today and her  reports it has been better.  She was finally able to get a dermatology apt and it is for the end of November.  She denies CP, shortness of breath.  Overall, pain well controlled and sleeping well, appetite ok.  Denies constipation or diarrhea, GERD.  She has been having some dizziness with position changes recently.  She admits she does not drink a lot of water.  She denies other symptoms with dizziness     Allergies, and PMH/PSH reviewed in [facility] today.  REVIEW OF SYSTEMS:  Limited  "secondary to cognitive impairment but as noted in HPI    Objective:   /82   Pulse 69   Temp 97.1  F (36.2  C)   Resp 18   Ht 1.6 m (5' 3\")   Wt 61.7 kg (136 lb)   BMI 24.09 kg/m    GENERAL APPEARANCE:  Alert, in no distress, cooperative, hygiene improved from my last visit  RESP:  respiratory effort and palpation of chest normal, lungs clear to auscultation , no respiratory distress  CV:  Palpation and auscultation of heart done , regular rate and rhythm, no murmur, rub, or gallop, no edema  ABDOMEN:  no guarding or rebound, bowel sounds normal, no masses  M/S:   Gait and station abnormal w/c bound and EZ stand for transfers, impaired ROM to bilateral hips and kness, bilateral kness with flexion contractures, kyphosis,   SKIN:  no open scabs or scratch marks noted today  NEURO:   speech clear no facial asymmetry, normal strength and tone, no focal findings   PSYCH:  oriented to person and place, memory impaired , affect and mood normal, very little insight and judgement into her situation, poor historian     Recent labs in [facility] reviewed by me today.     Assessment/Plan:  (G30.1,  F02.80) Late onset Alzheimer's disease without behavioral disturbance (H)  (primary encounter diagnosis)  (F41.9) Anxiety  Comment: progressive, impaired language and no longer ambulatory, very poor insight into her situation anxious features at times, resistant to hygiene cares but has been agreeable to sponge bathes and improvement in hygiene noted.  Her anxiety also is improved with hydroxyzine and this has improved itching as well  Plan: staff assist with cares and dispense meds  -hydroxyzine for anxiety and itching: multiple other modalities tried without success and this has improved her QOL and she has tolerated ok.  No increased confusion or lethargy noted    (I10) Hypertension, unspecified type  (R42) Dizziness  Comment: recent reports of dizziness with position changes  -given age and goals of care goal SBP <150  -BP " today at visit was 121/88, below goal  Plan: decrease valsartan to 60mg po every day  -staff obtain orthostatic BP's X 1 week and update me  -encourage fluids and slow position changes            (L28.0) Neurodermatitis  Comment: improved with sponge bathes and hydroxyzine, anti-itch lotion, claritine  Plan: continue above and derm apt later this month    (M15.9) Primary osteoarthritis involving multiple joints  Comment: advanced OA, pain has been an issue in the past but denies pain, w/c bound and very sedentary   Plan: continue APAP and voltaren gel  BID and monitor  -surgery for THIAGO cancelled Feb 2022 given advancing dementia      (D64.9) Anemia, unspecified type  Comment:   Lab Results   Component Value Date    HGB 12.2 07/12/2022     -no acute s/s blood loss  Plan: monitor Hgb prn       MED REC REQUIRED{Post Medication Reconciliation Status:  Patient was not discharged from an inpatient facility or TCU    Electronically signed by: NADINE Rogers CNP             Sincerely,        NADINE Rogers CNP

## 2022-11-18 ENCOUNTER — OFFICE VISIT (OUTPATIENT)
Dept: INTERNAL MEDICINE | Facility: CLINIC | Age: 87
End: 2022-11-18
Payer: MEDICARE

## 2022-11-18 VITALS
DIASTOLIC BLOOD PRESSURE: 93 MMHG | HEART RATE: 93 BPM | BODY MASS INDEX: 24.1 KG/M2 | HEIGHT: 63 IN | SYSTOLIC BLOOD PRESSURE: 153 MMHG | WEIGHT: 136 LBS | OXYGEN SATURATION: 95 % | TEMPERATURE: 97.9 F

## 2022-11-18 DIAGNOSIS — L29.9 ITCHING: Primary | ICD-10-CM

## 2022-11-18 DIAGNOSIS — E78.5 HYPERLIPIDEMIA, UNSPECIFIED HYPERLIPIDEMIA TYPE: ICD-10-CM

## 2022-11-18 DIAGNOSIS — E55.9 VITAMIN D DEFICIENCY: ICD-10-CM

## 2022-11-18 LAB
ALBUMIN SERPL BCG-MCNC: 4 G/DL (ref 3.5–5.2)
ALP SERPL-CCNC: 63 U/L (ref 35–104)
ALT SERPL W P-5'-P-CCNC: 7 U/L (ref 10–35)
ANION GAP SERPL CALCULATED.3IONS-SCNC: 12 MMOL/L (ref 7–15)
AST SERPL W P-5'-P-CCNC: 22 U/L (ref 10–35)
BASOPHILS # BLD AUTO: 0 10E3/UL (ref 0–0.2)
BASOPHILS NFR BLD AUTO: 1 %
BILIRUB SERPL-MCNC: 0.4 MG/DL
BUN SERPL-MCNC: 13.1 MG/DL (ref 8–23)
CALCIUM SERPL-MCNC: 9.1 MG/DL (ref 8.8–10.2)
CHLORIDE SERPL-SCNC: 102 MMOL/L (ref 98–107)
CHOLEST SERPL-MCNC: 165 MG/DL
CREAT SERPL-MCNC: 0.51 MG/DL (ref 0.51–0.95)
DEPRECATED CALCIDIOL+CALCIFEROL SERPL-MC: 43 UG/L (ref 20–75)
DEPRECATED HCO3 PLAS-SCNC: 26 MMOL/L (ref 22–29)
EOSINOPHIL # BLD AUTO: 0.6 10E3/UL (ref 0–0.7)
EOSINOPHIL NFR BLD AUTO: 15 %
ERYTHROCYTE [DISTWIDTH] IN BLOOD BY AUTOMATED COUNT: 13.8 % (ref 10–15)
GFR SERPL CREATININE-BSD FRML MDRD: >90 ML/MIN/1.73M2
GLUCOSE SERPL-MCNC: 90 MG/DL (ref 70–99)
HCT VFR BLD AUTO: 41.1 % (ref 35–47)
HDLC SERPL-MCNC: 53 MG/DL
HGB BLD-MCNC: 13.5 G/DL (ref 11.7–15.7)
IMM GRANULOCYTES # BLD: 0 10E3/UL
IMM GRANULOCYTES NFR BLD: 0 %
LDLC SERPL CALC-MCNC: 89 MG/DL
LYMPHOCYTES # BLD AUTO: 0.8 10E3/UL (ref 0.8–5.3)
LYMPHOCYTES NFR BLD AUTO: 19 %
MCH RBC QN AUTO: 28 PG (ref 26.5–33)
MCHC RBC AUTO-ENTMCNC: 32.8 G/DL (ref 31.5–36.5)
MCV RBC AUTO: 85 FL (ref 78–100)
MONOCYTES # BLD AUTO: 0.4 10E3/UL (ref 0–1.3)
MONOCYTES NFR BLD AUTO: 9 %
NEUTROPHILS # BLD AUTO: 2.3 10E3/UL (ref 1.6–8.3)
NEUTROPHILS NFR BLD AUTO: 56 %
NONHDLC SERPL-MCNC: 112 MG/DL
PLATELET # BLD AUTO: 240 10E3/UL (ref 150–450)
POTASSIUM SERPL-SCNC: 4.1 MMOL/L (ref 3.4–5.3)
PROT SERPL-MCNC: 6.8 G/DL (ref 6.4–8.3)
RBC # BLD AUTO: 4.83 10E6/UL (ref 3.8–5.2)
SODIUM SERPL-SCNC: 140 MMOL/L (ref 136–145)
TRIGL SERPL-MCNC: 113 MG/DL
TSH SERPL DL<=0.005 MIU/L-ACNC: 3.72 UIU/ML (ref 0.3–4.2)
WBC # BLD AUTO: 4.1 10E3/UL (ref 4–11)

## 2022-11-18 PROCEDURE — 80053 COMPREHEN METABOLIC PANEL: CPT

## 2022-11-18 PROCEDURE — 85025 COMPLETE CBC W/AUTO DIFF WBC: CPT

## 2022-11-18 PROCEDURE — 99213 OFFICE O/P EST LOW 20 MIN: CPT | Mod: 25

## 2022-11-18 PROCEDURE — 84443 ASSAY THYROID STIM HORMONE: CPT

## 2022-11-18 PROCEDURE — 36415 COLL VENOUS BLD VENIPUNCTURE: CPT

## 2022-11-18 PROCEDURE — G0009 ADMIN PNEUMOCOCCAL VACCINE: HCPCS

## 2022-11-18 PROCEDURE — 80061 LIPID PANEL: CPT

## 2022-11-18 PROCEDURE — 90677 PCV20 VACCINE IM: CPT

## 2022-11-18 PROCEDURE — 82306 VITAMIN D 25 HYDROXY: CPT

## 2022-11-18 RX ORDER — TRIAMCINOLONE ACETONIDE 1 MG/ML
LOTION TOPICAL 3 TIMES DAILY
Qty: 60 ML | Refills: 3 | Status: SHIPPED | OUTPATIENT
Start: 2022-11-18 | End: 2023-01-17

## 2022-11-18 ASSESSMENT — ASTHMA QUESTIONNAIRES
QUESTION_4 LAST FOUR WEEKS HOW OFTEN HAVE YOU USED YOUR RESCUE INHALER OR NEBULIZER MEDICATION (SUCH AS ALBUTEROL): NOT AT ALL
QUESTION_3 LAST FOUR WEEKS HOW OFTEN DID YOUR ASTHMA SYMPTOMS (WHEEZING, COUGHING, SHORTNESS OF BREATH, CHEST TIGHTNESS OR PAIN) WAKE YOU UP AT NIGHT OR EARLIER THAN USUAL IN THE MORNING: NOT AT ALL
QUESTION_1 LAST FOUR WEEKS HOW MUCH OF THE TIME DID YOUR ASTHMA KEEP YOU FROM GETTING AS MUCH DONE AT WORK, SCHOOL OR AT HOME: NONE OF THE TIME
QUESTION_2 LAST FOUR WEEKS HOW OFTEN HAVE YOU HAD SHORTNESS OF BREATH: NOT AT ALL
ACT_TOTALSCORE: 25
QUESTION_5 LAST FOUR WEEKS HOW WOULD YOU RATE YOUR ASTHMA CONTROL: COMPLETELY CONTROLLED
ACT_TOTALSCORE: 25

## 2022-11-18 ASSESSMENT — PATIENT HEALTH QUESTIONNAIRE - PHQ9: SUM OF ALL RESPONSES TO PHQ QUESTIONS 1-9: 4

## 2022-11-18 NOTE — PATIENT INSTRUCTIONS
Try Eucerin, Aphafor or Cerave after shower to help manage itching.     I prescribed steroid lotion that you can use twice daily if helpful. If it is not helpful

## 2022-11-18 NOTE — NURSING NOTE
Prior to injection, verified patient identity using patient's name and date of birth.  Due to injection administration, patient instructed to remain in clinic for 15 minutes afterwards, and to report any adverse reaction to me or the  staff immediately.      Drug Amount Wasted:  None.  Vial/Syringe: Syringe  Expiration Date:  2/1/24    Screening Questionnaire for Adult Immunization     Are you sick today?   No    Do you have allergies to medications, food or any vaccine?   No    Have you ever had a serious reaction after receiving a vaccination?   No    Do you have a long-term health problem with heart disease, lung disease,  asthma, kidney disease, diabetes, anemia, metabolic or blood disease?   No    Do you have cancer, leukemia, AIDS, or any immune system problem?   No    Do you take cortisone, prednisone, other steroids, or anticancer drugs, or  have you had any x-ray (radiation) treatments?   No    Have you had a seizure, brain, or other nervous system problem?   No    During the past year, have you received a transfusion of blood or blood       products, or been given a medicine called immune (gamma) globulin?   No    For women: Are you pregnant or is there a chance you could become         pregnant during the next month?   No    Have you received any vaccinations in the past 4 weeks?   No     Immunization questionnaire answers were all negative.      MNVFC doesn't apply on this patient     Screening performed by Brittni Cuenca MA on 11/18/2022 at 8:55 AM.

## 2022-11-18 NOTE — LETTER
[place] [address], [address]  [place] [address]  [phone]        November 21, 2022    Edmundo James  [address] [place] [address]            Dear MsKiar James:    Your labs are within acceptable ranges. Call if you have any questions     Clifford Smith NP       Results for orders placed or performed in visit on 11/18/22   Lipid panel reflex to direct LDL Fasting     Status: Normal   Result Value Ref Range    Cholesterol 165 <200 mg/dL    Triglycerides 113 <150 mg/dL    Direct Measure HDL 53 >=50 mg/dL    LDL Cholesterol Calculated 89 <=100 mg/dL    Non HDL Cholesterol 112 <130 mg/dL    Narrative    Cholesterol  Desirable:  <200 mg/dL    Triglycerides  Normal:  Less than 150 mg/dL  Borderline High:  150-199 mg/dL  High:  200-499 mg/dL  Very High:  Greater than or equal to 500 mg/dL    Direct Measure HDL  Female:  Greater than or equal to 50 mg/dL   Male:  Greater than or equal to 40 mg/dL    LDL Cholesterol  Desirable:  <100mg/dL  Above Desirable:  100-129 mg/dL   Borderline High:  130-159 mg/dL   High:  160-189 mg/dL   Very High:  >= 190 mg/dL    Non HDL Cholesterol  Desirable:  130 mg/dL  Above Desirable:  130-159 mg/dL  Borderline High:  160-189 mg/dL  High:  190-219 mg/dL  Very High:  Greater than or equal to 220 mg/dL   TSH with free T4 reflex     Status: Normal   Result Value Ref Range    TSH 3.72 0.30 - 4.20 uIU/mL   Comprehensive metabolic panel (BMP + Alb, Alk Phos, ALT, AST, Total. Bili, TP)     Status: Abnormal   Result Value Ref Range    Sodium 140 136 - 145 mmol/L    Potassium 4.1 3.4 - 5.3 mmol/L    Chloride 102 98 - 107 mmol/L    Carbon Dioxide (CO2) 26 22 - 29 mmol/L    Anion Gap 12 7 - 15 mmol/L    Urea Nitrogen 13.1 8.0 - 23.0 mg/dL    Creatinine 0.51 0.51 - 0.95 mg/dL    Calcium 9.1 8.8 - 10.2 mg/dL    Glucose 90 70 - 99 mg/dL    Alkaline Phosphatase 63 35 - 104 U/L    AST 22 10 - 35 U/L    ALT 7 (L) 10 - 35 U/L     Protein Total 6.8 6.4 - 8.3 g/dL    Albumin 4.0 3.5 - 5.2 g/dL    Bilirubin Total 0.4 <=1.2 mg/dL    GFR Estimate >90 >60 mL/min/1.73m2   Vitamin D Deficiency     Status: Normal   Result Value Ref Range    Vitamin D, Total (25-Hydroxy) 43 20 - 75 ug/L    Narrative    Season, race, dietary intake, and treatment affect the concentration of 25-hydroxy-Vitamin D. Values may decrease during winter months and increase during summer months. Values 20-29 ug/L may indicate Vitamin D insufficiency and values <20 ug/L may indicate Vitamin D deficiency.    Vitamin D determination is routinely performed by an immunoassay specific for 25 hydroxyvitamin D3.  If an individual is on vitamin D2(ergocalciferol) supplementation, please specify 25 OH vitamin D2 and D3 level determination by LCMSMS test VITD23.     CBC with platelets and differential     Status: None   Result Value Ref Range    WBC Count 4.1 4.0 - 11.0 10e3/uL    RBC Count 4.83 3.80 - 5.20 10e6/uL    Hemoglobin 13.5 11.7 - 15.7 g/dL    Hematocrit 41.1 35.0 - 47.0 %    MCV 85 78 - 100 fL    MCH 28.0 26.5 - 33.0 pg    MCHC 32.8 31.5 - 36.5 g/dL    RDW 13.8 10.0 - 15.0 %    Platelet Count 240 150 - 450 10e3/uL    % Neutrophils 56 %    % Lymphocytes 19 %    % Monocytes 9 %    % Eosinophils 15 %    % Basophils 1 %    % Immature Granulocytes 0 %    Absolute Neutrophils 2.3 1.6 - 8.3 10e3/uL    Absolute Lymphocytes 0.8 0.8 - 5.3 10e3/uL    Absolute Monocytes 0.4 0.0 - 1.3 10e3/uL    Absolute Eosinophils 0.6 0.0 - 0.7 10e3/uL    Absolute Basophils 0.0 0.0 - 0.2 10e3/uL    Absolute Immature Granulocytes 0.0 <=0.4 10e3/uL   CBC with platelets and differential     Status: None    Narrative    The following orders were created for panel order CBC with platelets and differential.  Procedure                               Abnormality         Status                     ---------                               -----------         ------                     CBC with platelets and  d...[[id number]]                      Final result                 Please view results for these tests on the individual orders.

## 2022-11-18 NOTE — PROGRESS NOTES
Assessment & Plan     Itching  Ongoing for 9 months or so. Will check labs that could contribute, and prescribe kenalog and recommend Worship moisturizing as well. Will place dermatology referral  - Adult Dermatology Referral; Future  - TSH with free T4 reflex; Future  - Comprehensive metabolic panel (BMP + Alb, Alk Phos, ALT, AST, Total. Bili, TP); Future  - CBC with platelets and differential; Future  - triamcinolone (KENALOG) 0.1 % external lotion; Apply topically 3 times daily  - Vitamin D Deficiency; Future  - TSH with free T4 reflex  - Comprehensive metabolic panel (BMP + Alb, Alk Phos, ALT, AST, Total. Bili, TP)  - CBC with platelets and differential  - Vitamin D Deficiency    Vitamin D deficiency  Will check levels. Currently on 2000iu   - Vitamin D Deficiency; Future  - Vitamin D Deficiency    Hyperlipidemia, unspecified hyperlipidemia type  screening  - Lipid panel reflex to direct LDL Fasting; Future  - Lipid panel reflex to direct LDL Fasting    Patient BP elevated, has not taken medication today and is followed by NP at facility    Depression Screening Follow Up    PHQ 11/18/2022   PHQ-9 Total Score 4   Q9: Thoughts of better off dead/self-harm past 2 weeks Several days     Patient states that she has no thoughts of killing herself but sometimes the thought of dying is present to her. She has no plan, and states that she does not have intention of killing herself.    Clifford Smith NP  [facility] Clinic[facility]AN[facility] Wyatt is a 86 year old accompanied by her daughter, presenting for the following health issues:  Derm Problem (Itching on arms, legs, and back.)      HPI     Patient has had ongoing excessive itching for has been ongoing for about 9 months to the point where she is gouging herself   Itching is on torso and on legs primarily  It is consistently itchy per report but she thinks that her daughter thinks that it is worse in the evening     Has tried hydrocortisone,  "eczema treatment, goldbond medication lotion    Review of Systems   Constitutional, HEENT, cardiovascular, pulmonary, GI, , musculoskeletal, neuro, skin, endocrine and psych systems are negative, except as otherwise noted.      Objective    BP (!) 153/93 (BP Location: Left arm, Patient Position: Sitting, Cuff Size: Adult Regular)   Pulse 93   Temp 97.9  F (36.6  C) (Tympanic)   Ht 1.6 m (5' 3\")   Wt 61.7 kg (136 lb)   SpO2 95%   BMI 24.09 kg/m    Body mass index is 24.09 kg/m .  Physical Exam   GENERAL: alert and no distress  NECK: no adenopathy, no asymmetry, masses, or scars and thyroid normal to palpation  RESP: lungs clear to auscultation - no rales, rhonchi or wheezes  CV: regular rate and rhythm, normal S1 S2, no S3 or S4, no murmur, click or rub, no peripheral edema and peripheral pulses strong  ABDOMEN: soft, nontender, no hepatosplenomegaly, no masses and bowel sounds normal  MS: no gross musculoskeletal defects noted, no edema  SKIN: no suspicious lesions or rashes, linear scratch marks on back and abdomen  NEURO: Normal strength and tone, mentation intact and speech normal  PSYCH: mentation appears normal, affect normal/bright    "

## 2022-11-21 ENCOUNTER — TELEPHONE (OUTPATIENT)
Dept: INTERNAL MEDICINE | Facility: CLINIC | Age: 87
End: 2022-11-21

## 2022-11-21 NOTE — TELEPHONE ENCOUNTER
Patient Quality Outreach    Patient is due for the following:   Asthma  -  ACT needed    Next Steps:   No follow up needed at this time.  ACT was completed during office visit today.    Type of outreach:    Chart review performed, no outreach needed.      Questions for provider review:    None     Brittni Cuenca MA  Chart routed to closed.

## 2022-11-23 ENCOUNTER — TELEPHONE (OUTPATIENT)
Dept: INTERNAL MEDICINE | Facility: CLINIC | Age: 87
End: 2022-11-23

## 2022-11-23 NOTE — TELEPHONE ENCOUNTER
Patient did not schedule dermatology appointment. See if it is OK to contact daughter and she may help with scheduling.    Dear Colleague,     Thank you for your referral to the [facility]. We have been unable to reach the patient after multiple contact attempts.       If you have any questions or concerns, please contact our office at Dept: [phone].     Clifford Smith NP

## 2022-12-13 NOTE — TELEPHONE ENCOUNTER
Patient's daughter's number left message on machine to call back (see Consent to Communicate).

## 2022-12-15 NOTE — TELEPHONE ENCOUNTER
Called patient lives in assisted living spoke with daughter and she said she has been unable to make dermatology appoint but, would do it as soon as possible.

## 2023-01-01 ENCOUNTER — LAB REQUISITION (OUTPATIENT)
Dept: LAB | Facility: CLINIC | Age: 88
End: 2023-01-01
Payer: MEDICARE

## 2023-01-01 ENCOUNTER — TELEPHONE (OUTPATIENT)
Dept: GERIATRICS | Facility: CLINIC | Age: 88
End: 2023-01-01
Payer: MEDICARE

## 2023-01-01 ENCOUNTER — ASSISTED LIVING VISIT (OUTPATIENT)
Dept: GERIATRICS | Facility: CLINIC | Age: 88
End: 2023-01-01
Payer: MEDICARE

## 2023-01-01 VITALS
HEIGHT: 63 IN | DIASTOLIC BLOOD PRESSURE: 71 MMHG | BODY MASS INDEX: 25.52 KG/M2 | HEART RATE: 89 BPM | WEIGHT: 144 LBS | SYSTOLIC BLOOD PRESSURE: 122 MMHG | RESPIRATION RATE: 20 BRPM | TEMPERATURE: 97.9 F

## 2023-01-01 VITALS
DIASTOLIC BLOOD PRESSURE: 71 MMHG | TEMPERATURE: 97.8 F | BODY MASS INDEX: 25.52 KG/M2 | HEART RATE: 89 BPM | SYSTOLIC BLOOD PRESSURE: 122 MMHG | RESPIRATION RATE: 20 BRPM | HEIGHT: 63 IN | WEIGHT: 144 LBS

## 2023-01-01 VITALS
DIASTOLIC BLOOD PRESSURE: 88 MMHG | SYSTOLIC BLOOD PRESSURE: 139 MMHG | BODY MASS INDEX: 24.8 KG/M2 | WEIGHT: 140 LBS | RESPIRATION RATE: 18 BRPM | HEIGHT: 63 IN | HEART RATE: 76 BPM | TEMPERATURE: 97.2 F

## 2023-01-01 VITALS
RESPIRATION RATE: 18 BRPM | TEMPERATURE: 97.2 F | HEART RATE: 78 BPM | WEIGHT: 144 LBS | DIASTOLIC BLOOD PRESSURE: 74 MMHG | BODY MASS INDEX: 25.52 KG/M2 | SYSTOLIC BLOOD PRESSURE: 98 MMHG | HEIGHT: 63 IN

## 2023-01-01 VITALS
BODY MASS INDEX: 25.71 KG/M2 | OXYGEN SATURATION: 93 % | DIASTOLIC BLOOD PRESSURE: 80 MMHG | WEIGHT: 145.1 LBS | HEIGHT: 63 IN | RESPIRATION RATE: 18 BRPM | SYSTOLIC BLOOD PRESSURE: 150 MMHG | TEMPERATURE: 97.5 F | HEART RATE: 81 BPM

## 2023-01-01 DIAGNOSIS — K59.03 DRUG-INDUCED CONSTIPATION: ICD-10-CM

## 2023-01-01 DIAGNOSIS — B37.2 YEAST INFECTION OF THE SKIN: ICD-10-CM

## 2023-01-01 DIAGNOSIS — J45.20 MILD INTERMITTENT ASTHMA WITHOUT COMPLICATION: ICD-10-CM

## 2023-01-01 DIAGNOSIS — M15.0 PRIMARY OSTEOARTHRITIS INVOLVING MULTIPLE JOINTS: Primary | ICD-10-CM

## 2023-01-01 DIAGNOSIS — G89.29 CHRONIC PAIN OF RIGHT KNEE: ICD-10-CM

## 2023-01-01 DIAGNOSIS — M15.0 PRIMARY OSTEOARTHRITIS INVOLVING MULTIPLE JOINTS: ICD-10-CM

## 2023-01-01 DIAGNOSIS — F02.80 LATE ONSET ALZHEIMER'S DISEASE WITHOUT BEHAVIORAL DISTURBANCE (H): ICD-10-CM

## 2023-01-01 DIAGNOSIS — R23.8 SKIN BREAKDOWN: ICD-10-CM

## 2023-01-01 DIAGNOSIS — M25.551 HIP PAIN, RIGHT: ICD-10-CM

## 2023-01-01 DIAGNOSIS — G30.1 LATE ONSET ALZHEIMER'S DISEASE WITHOUT BEHAVIORAL DISTURBANCE (H): ICD-10-CM

## 2023-01-01 DIAGNOSIS — I10 ESSENTIAL HYPERTENSION: ICD-10-CM

## 2023-01-01 DIAGNOSIS — J45.20 MILD INTERMITTENT ASTHMA WITHOUT COMPLICATION: Primary | ICD-10-CM

## 2023-01-01 DIAGNOSIS — G30.1 LATE ONSET ALZHEIMER'S DISEASE WITHOUT BEHAVIORAL DISTURBANCE (H): Primary | ICD-10-CM

## 2023-01-01 DIAGNOSIS — M25.561 CHRONIC PAIN OF RIGHT KNEE: ICD-10-CM

## 2023-01-01 DIAGNOSIS — J45.40 MODERATE PERSISTENT ASTHMA, UNSPECIFIED WHETHER COMPLICATED: Primary | ICD-10-CM

## 2023-01-01 DIAGNOSIS — J30.1 ALLERGIC RHINITIS DUE TO POLLEN, UNSPECIFIED SEASONALITY: ICD-10-CM

## 2023-01-01 DIAGNOSIS — G93.2 BENIGN INTRACRANIAL HYPERTENSION: ICD-10-CM

## 2023-01-01 DIAGNOSIS — L28.0 NEURODERMATITIS: ICD-10-CM

## 2023-01-01 DIAGNOSIS — J18.9 PNEUMONIA OF RIGHT LOWER LOBE DUE TO INFECTIOUS ORGANISM: Primary | ICD-10-CM

## 2023-01-01 DIAGNOSIS — R19.5 LOOSE STOOLS: ICD-10-CM

## 2023-01-01 DIAGNOSIS — J11.1 INFLUENZA: Primary | ICD-10-CM

## 2023-01-01 DIAGNOSIS — R26.9 IMPAIRED GAIT: ICD-10-CM

## 2023-01-01 DIAGNOSIS — L29.9 ITCHING: ICD-10-CM

## 2023-01-01 DIAGNOSIS — I10 HYPERTENSION, UNSPECIFIED TYPE: ICD-10-CM

## 2023-01-01 DIAGNOSIS — F02.80 LATE ONSET ALZHEIMER'S DISEASE WITHOUT BEHAVIORAL DISTURBANCE (H): Primary | ICD-10-CM

## 2023-01-01 DIAGNOSIS — E55.9 VITAMIN D DEFICIENCY: ICD-10-CM

## 2023-01-01 LAB
ANION GAP SERPL CALCULATED.3IONS-SCNC: 13 MMOL/L (ref 7–15)
BUN SERPL-MCNC: 11.8 MG/DL (ref 8–23)
CALCIUM SERPL-MCNC: 9.2 MG/DL (ref 8.8–10.2)
CHLORIDE SERPL-SCNC: 98 MMOL/L (ref 98–107)
CREAT SERPL-MCNC: 0.51 MG/DL (ref 0.51–0.95)
DEPRECATED HCO3 PLAS-SCNC: 24 MMOL/L (ref 22–29)
EGFRCR SERPLBLD CKD-EPI 2021: >90 ML/MIN/1.73M2
ERYTHROCYTE [DISTWIDTH] IN BLOOD BY AUTOMATED COUNT: 14 % (ref 10–15)
GLUCOSE SERPL-MCNC: 76 MG/DL (ref 70–99)
HCT VFR BLD AUTO: 41.2 % (ref 35–47)
HGB BLD-MCNC: 13.5 G/DL (ref 11.7–15.7)
MCH RBC QN AUTO: 28.5 PG (ref 26.5–33)
MCHC RBC AUTO-ENTMCNC: 32.8 G/DL (ref 31.5–36.5)
MCV RBC AUTO: 87 FL (ref 78–100)
PLATELET # BLD AUTO: 267 10E3/UL (ref 150–450)
POTASSIUM SERPL-SCNC: 3.9 MMOL/L (ref 3.4–5.3)
RBC # BLD AUTO: 4.73 10E6/UL (ref 3.8–5.2)
SODIUM SERPL-SCNC: 135 MMOL/L (ref 135–145)
WBC # BLD AUTO: 3.7 10E3/UL (ref 4–11)

## 2023-01-01 PROCEDURE — 36415 COLL VENOUS BLD VENIPUNCTURE: CPT | Mod: ORL | Performed by: NURSE PRACTITIONER

## 2023-01-01 PROCEDURE — 99349 HOME/RES VST EST MOD MDM 40: CPT | Performed by: NURSE PRACTITIONER

## 2023-01-01 PROCEDURE — 99348 HOME/RES VST EST LOW MDM 30: CPT | Performed by: NURSE PRACTITIONER

## 2023-01-01 PROCEDURE — P9604 ONE-WAY ALLOW PRORATED TRIP: HCPCS | Mod: ORL | Performed by: NURSE PRACTITIONER

## 2023-01-01 PROCEDURE — 85027 COMPLETE CBC AUTOMATED: CPT | Mod: ORL | Performed by: NURSE PRACTITIONER

## 2023-01-01 PROCEDURE — 80048 BASIC METABOLIC PNL TOTAL CA: CPT | Mod: ORL | Performed by: NURSE PRACTITIONER

## 2023-01-01 RX ORDER — VALSARTAN 40 MG/1
TABLET ORAL
Qty: 135 TABLET | Refills: 97 | Status: SHIPPED | OUTPATIENT
Start: 2023-01-01

## 2023-01-01 RX ORDER — SENNA AND DOCUSATE SODIUM 50; 8.6 MG/1; MG/1
1 TABLET, FILM COATED ORAL DAILY PRN
Qty: 30 TABLET | Refills: 1 | Status: SHIPPED | OUTPATIENT
Start: 2023-01-01 | End: 2024-01-01

## 2023-01-01 RX ORDER — LOPERAMIDE HCL 2 MG
2 CAPSULE ORAL 4 TIMES DAILY PRN
COMMUNITY
Start: 2023-01-01 | End: 2023-01-01

## 2023-01-01 RX ORDER — OXYCODONE HYDROCHLORIDE 5 MG/1
TABLET ORAL
Qty: 30 TABLET | Refills: 0 | Status: SHIPPED | OUTPATIENT
Start: 2023-01-01 | End: 2023-01-01

## 2023-01-01 RX ORDER — CHOLECALCIFEROL (VITAMIN D3) 50 MCG
TABLET ORAL
Qty: 90 TABLET | Refills: 97 | Status: SHIPPED | OUTPATIENT
Start: 2023-01-01

## 2023-01-01 RX ORDER — NYSTATIN 100000 [USP'U]/G
POWDER TOPICAL
Qty: 60 G | Refills: 97 | Status: SHIPPED | OUTPATIENT
Start: 2023-01-01

## 2023-01-01 RX ORDER — MENTHOL 1 G/100G
POWDER TOPICAL
Qty: 76.5 G | Refills: 97 | Status: SHIPPED | OUTPATIENT
Start: 2023-01-01

## 2023-01-01 RX ORDER — BUDESONIDE AND FORMOTEROL FUMARATE DIHYDRATE 160; 4.5 UG/1; UG/1
2 AEROSOL RESPIRATORY (INHALATION) 2 TIMES DAILY
Qty: 10.2 G | Refills: 11 | Status: SHIPPED | OUTPATIENT
Start: 2023-01-01

## 2023-01-01 RX ORDER — OXYCODONE HYDROCHLORIDE 5 MG/1
TABLET ORAL
Qty: 60 TABLET | Refills: 0 | Status: SHIPPED | OUTPATIENT
Start: 2023-01-01 | End: 2023-01-01

## 2023-01-01 RX ORDER — LORATADINE 10 MG/1
TABLET ORAL
Qty: 90 TABLET | Refills: 3 | Status: SHIPPED | OUTPATIENT
Start: 2023-01-01

## 2023-01-01 RX ORDER — OSELTAMIVIR PHOSPHATE 75 MG/1
75 CAPSULE ORAL DAILY
Qty: 14 CAPSULE | Refills: 0 | Status: SHIPPED | OUTPATIENT
Start: 2023-01-01 | End: 2023-01-01

## 2023-01-01 RX ORDER — PSEUDOEPHED/ACETAMINOPH/DIPHEN 30MG-500MG
TABLET ORAL
Qty: 540 TABLET | Refills: 3 | Status: SHIPPED | OUTPATIENT
Start: 2023-01-01 | End: 2024-01-01

## 2023-01-01 RX ORDER — BUDESONIDE AND FORMOTEROL FUMARATE DIHYDRATE 160; 4.5 UG/1; UG/1
2 AEROSOL RESPIRATORY (INHALATION)
Qty: 10.2 G | Refills: 3 | Status: SHIPPED | OUTPATIENT
Start: 2023-01-01 | End: 2023-01-01

## 2023-01-01 RX ORDER — ANORECTAL OINTMENT 15.7; .44; 24; 20.6 G/100G; G/100G; G/100G; G/100G
OINTMENT TOPICAL
Qty: 113 G | Status: SHIPPED | OUTPATIENT
Start: 2023-01-01

## 2023-01-01 RX ORDER — OXYCODONE HYDROCHLORIDE 5 MG/1
TABLET ORAL
Qty: 60 TABLET | Refills: 0 | Status: SHIPPED | OUTPATIENT
Start: 2023-01-01 | End: 2024-01-01

## 2023-01-17 ENCOUNTER — ASSISTED LIVING VISIT (OUTPATIENT)
Dept: GERIATRICS | Facility: CLINIC | Age: 88
End: 2023-01-17
Payer: MEDICARE

## 2023-01-17 VITALS
BODY MASS INDEX: 24.24 KG/M2 | RESPIRATION RATE: 18 BRPM | SYSTOLIC BLOOD PRESSURE: 132 MMHG | HEIGHT: 63 IN | TEMPERATURE: 97.9 F | HEART RATE: 86 BPM | WEIGHT: 136.8 LBS | DIASTOLIC BLOOD PRESSURE: 72 MMHG

## 2023-01-17 DIAGNOSIS — L29.9 ITCHING: ICD-10-CM

## 2023-01-17 DIAGNOSIS — G89.29 CHRONIC PAIN OF RIGHT KNEE: ICD-10-CM

## 2023-01-17 DIAGNOSIS — L28.0 NEURODERMATITIS: ICD-10-CM

## 2023-01-17 DIAGNOSIS — M25.561 CHRONIC PAIN OF RIGHT KNEE: ICD-10-CM

## 2023-01-17 DIAGNOSIS — G30.1 LATE ONSET ALZHEIMER'S DISEASE WITHOUT BEHAVIORAL DISTURBANCE (H): ICD-10-CM

## 2023-01-17 DIAGNOSIS — M15.0 PRIMARY OSTEOARTHRITIS INVOLVING MULTIPLE JOINTS: Primary | ICD-10-CM

## 2023-01-17 DIAGNOSIS — F02.80 LATE ONSET ALZHEIMER'S DISEASE WITHOUT BEHAVIORAL DISTURBANCE (H): ICD-10-CM

## 2023-01-17 DIAGNOSIS — F41.9 ANXIETY: ICD-10-CM

## 2023-01-17 DIAGNOSIS — F33.1 MODERATE EPISODE OF RECURRENT MAJOR DEPRESSIVE DISORDER (H): ICD-10-CM

## 2023-01-17 DIAGNOSIS — I10 HYPERTENSION, UNSPECIFIED TYPE: ICD-10-CM

## 2023-01-17 PROCEDURE — 99349 HOME/RES VST EST MOD MDM 40: CPT | Performed by: NURSE PRACTITIONER

## 2023-01-17 RX ORDER — HYDROXYZINE HYDROCHLORIDE 25 MG/1
12.5 TABLET, FILM COATED ORAL 2 TIMES DAILY
Qty: 60 TABLET | Refills: 11 | Status: SHIPPED | OUTPATIENT
Start: 2023-01-17 | End: 2023-04-04

## 2023-01-17 NOTE — LETTER
1/17/2023        RE: Edmundo James  [address] [place] [address]        [facility] GERIATRICS    Chief Complaint   Patient presents with     RECHECK     HPI:  Edmundo James is a 87 year old  (1935), who is being seen today for an episodic care visit at: [facility] ([facility]) [[id number]]. Today's concern is:     Patient is an 86 year old female with  PHI significant for HTN, HLD, OA, depression, asthma, and dementia without official work-up.  She has extensive joint degeneration to her hips and knees and primarily w/c bound now.  Has been seen by [facility] for possible THIAGO.  Was initially planned to have surgery in February 2022 but with significant change in patient condition, surgery was cancelled.  She has struggled with anxiety/depressed mood but had significant hyponatremia with selective serotonin reuptake inhibitor, so this was discontinued and family not interested in trying other medication for mood given her reaction. Her mood has since improved     She is seen today to follow-up on her co-morbidities.  She is a very poor historian and much of the history provided by staff and her .  Chronic itching of skin has improved. She was to see dermatology for this, but was unable to get to apt.  She reports creams have been helping, also allowing staff to do sponge bathes and occasional bathes which have helped.  Biggest concern is worsening and severe pain to right knee especially in the mornings when staff getting her up out of bed.  She is on scheduled APAP, but receives after breakfast with her other meds as had nausea and vomiting if took pills on an empty stomach.  She is also using the EZ stand for transfers and has noteable flexion contractions to bilateral knees.  I discussed with her that could make pain worse and could try sonya lift.  Sleeping well, denies CP, shortness of breath, abdominal pain, n/v or trouble with bowels.      Allergies, and PMH/PSH  "reviewed in [facility] today.  REVIEW OF SYSTEMS:  Limited secondary to cognitive impairment but as noted in HPI    Objective:   /72   Pulse 86   Temp 97.9  F (36.6  C)   Resp 18   Ht 1.6 m (5' 3\")   Wt 62.1 kg (136 lb 12.8 oz)   BMI 24.23 kg/m    GENERAL APPEARANCE:  Alert, in no distress, cooperative, reading newspaper  RESP:  respiratory effort and palpation of chest normal, lungs clear to auscultation , no respiratory distress, occasional non-productive cough  CV:  Palpation and auscultation of heart done , regular rate and rhythm, no murmur, rub, or gallop, no edema  ABDOMEN:  no guarding or rebound, bowel sounds normal, no masses  M/S:   Gait and station abnormal w/c bound and EZ stand for transfers, impaired ROM to bilateral hips and kness, bilateral kness with flexion contractures and crepitus, kyphosis,   SKIN:  no open scabs or scratch marks noted today  NEURO:   speech clear no facial asymmetry, normal strength and tone, no focal findings   PSYCH:  oriented to person and place, memory impaired , affect and mood normal, very little insight and judgement into her situation, poor historian     Recent labs in [facility] reviewed by me today.     Assessment/Plan:  (M15.9) Primary osteoarthritis involving multiple joints  (primary encounter diagnosis)   (M25.561,  G89.29) Chronic pain of right knee  Comment: worsening pain to right knee with transfers and getting OOB in the morning  -as noted in HPI, does not receive APAP until after breakfast due to difficulty with meds on empty stomch  -flexion contractures to knees make EZ stand painful, patient unsure if wants to try sonya lift  Plan: continue voltaren gel  -discussed with staff and wrote order for patient to receive APAP at 7AM prior to her getting up OOB.  She can received her other meds after breakfast  -start  Lidocaine-Menthol 4-1 % PTCH  -staff to update if not effective     (G30.1,  F02.80) Late onset Alzheimer's disease without behavioral " disturbance (H)  (F33.1) Moderate episode of recurrent major depressive disorder (H)  (F41.9) Anxiety  Comment: progressive, impaired language and no longer ambulatory, very poor insight into her situation anxious features at times, resistant to hygiene cares in past but with some recent improvement  -on hydroxyzine, which has improved her anxiety and itching, given improvement of both, reasonable to try GDR.  To note, she did not tolerate selective serotonin reuptake inhibitor's due to hyponatremia  Plan: staff assist with cares and dispense meds  -hydroxyzine for anxiety and itching: multiple other modalities tried without success and this has improved her QOL, I will reduce her dose to 12.5mg po BID and monitor       (L29.9) Itching  (L28.0) Neurodermatitis  Comment: improved with improvement in hygiene and anxiety  -was not able to get in with derm  Plan: as noted above, will try reduction in hydroxyzine to 12.5mg po BID and monitor  -continue anti-itch lotion and claritine  -d'c triamcinolone     (I10) Hypertension, unspecified type  Comment:   BP Readings from Last 3 Encounters:   01/17/23 132/72   11/18/22 (!) 153/93   11/01/22 132/82   -reasonable control given age and goals of care  Plan: continue current POC and monitor       MED REC REQUIRED{Post Medication Reconciliation Status:  Patient was not discharged from an inpatient facility or TCU        Electronically signed by: NADINE Rogers CNP             Sincerely,        NADINE Rogers CNP

## 2023-01-17 NOTE — PROGRESS NOTES
[facility] GERIATRICS    Chief Complaint   Patient presents with     RECHECK     HPI:  Edmundo James is a 87 year old  (1935), who is being seen today for an episodic care visit at: [facility] ([facility]) [[id number]]. Today's concern is:     Patient is an 86 year old female with  PHI significant for HTN, HLD, OA, depression, asthma, and dementia without official work-up.  She has extensive joint degeneration to her hips and knees and primarily w/c bound now.  Has been seen by [facility] for possible THIAGO.  Was initially planned to have surgery in February 2022 but with significant change in patient condition, surgery was cancelled.  She has struggled with anxiety/depressed mood but had significant hyponatremia with selective serotonin reuptake inhibitor, so this was discontinued and family not interested in trying other medication for mood given her reaction. Her mood has since improved     She is seen today to follow-up on her co-morbidities.  She is a very poor historian and much of the history provided by staff and her .  Chronic itching of skin has improved. She was to see dermatology for this, but was unable to get to apt.  She reports creams have been helping, also allowing staff to do sponge bathes and occasional bathes which have helped.  Biggest concern is worsening and severe pain to right knee especially in the mornings when staff getting her up out of bed.  She is on scheduled APAP, but receives after breakfast with her other meds as had nausea and vomiting if took pills on an empty stomach.  She is also using the EZ stand for transfers and has noteable flexion contractions to bilateral knees.  I discussed with her that could make pain worse and could try sonya lift.  Sleeping well, denies CP, shortness of breath, abdominal pain, n/v or trouble with bowels.      Allergies, and PMH/PSH reviewed in [facility] today.  REVIEW OF SYSTEMS:  Limited secondary to cognitive impairment but as  "noted in HPI    Objective:   /72   Pulse 86   Temp 97.9  F (36.6  C)   Resp 18   Ht 1.6 m (5' 3\")   Wt 62.1 kg (136 lb 12.8 oz)   BMI 24.23 kg/m    GENERAL APPEARANCE:  Alert, in no distress, cooperative, reading newspaper  RESP:  respiratory effort and palpation of chest normal, lungs clear to auscultation , no respiratory distress, occasional non-productive cough  CV:  Palpation and auscultation of heart done , regular rate and rhythm, no murmur, rub, or gallop, no edema  ABDOMEN:  no guarding or rebound, bowel sounds normal, no masses  M/S:   Gait and station abnormal w/c bound and EZ stand for transfers, impaired ROM to bilateral hips and kness, bilateral kness with flexion contractures and crepitus, kyphosis,   SKIN:  no open scabs or scratch marks noted today  NEURO:   speech clear no facial asymmetry, normal strength and tone, no focal findings   PSYCH:  oriented to person and place, memory impaired , affect and mood normal, very little insight and judgement into her situation, poor historian     Recent labs in [facility] reviewed by me today.     Assessment/Plan:  (M15.9) Primary osteoarthritis involving multiple joints  (primary encounter diagnosis)   (M25.561,  G89.29) Chronic pain of right knee  Comment: worsening pain to right knee with transfers and getting OOB in the morning  -as noted in HPI, does not receive APAP until after breakfast due to difficulty with meds on empty stomch  -flexion contractures to knees make EZ stand painful, patient unsure if wants to try sonya lift  Plan: continue voltaren gel  -discussed with staff and wrote order for patient to receive APAP at 7AM prior to her getting up OOB.  She can received her other meds after breakfast  -start  Lidocaine-Menthol 4-1 % PTCH  -staff to update if not effective     (G30.1,  F02.80) Late onset Alzheimer's disease without behavioral disturbance (H)  (F33.1) Moderate episode of recurrent major depressive disorder (H)  (F41.9) " Anxiety  Comment: progressive, impaired language and no longer ambulatory, very poor insight into her situation anxious features at times, resistant to hygiene cares in past but with some recent improvement  -on hydroxyzine, which has improved her anxiety and itching, given improvement of both, reasonable to try GDR.  To note, she did not tolerate selective serotonin reuptake inhibitor's due to hyponatremia  Plan: staff assist with cares and dispense meds  -hydroxyzine for anxiety and itching: multiple other modalities tried without success and this has improved her QOL, I will reduce her dose to 12.5mg po BID and monitor       (L29.9) Itching  (L28.0) Neurodermatitis  Comment: improved with improvement in hygiene and anxiety  -was not able to get in with derm  Plan: as noted above, will try reduction in hydroxyzine to 12.5mg po BID and monitor  -continue anti-itch lotion and claritine  -d'c triamcinolone     (I10) Hypertension, unspecified type  Comment:   BP Readings from Last 3 Encounters:   01/17/23 132/72   11/18/22 (!) 153/93   11/01/22 132/82   -reasonable control given age and goals of care  Plan: continue current POC and monitor       MED REC REQUIRED{Post Medication Reconciliation Status:  Patient was not discharged from an inpatient facility or TCU        Electronically signed by: NADINE Rogers CNP

## 2023-02-21 ENCOUNTER — ASSISTED LIVING VISIT (OUTPATIENT)
Dept: GERIATRICS | Facility: CLINIC | Age: 88
End: 2023-02-21
Payer: MEDICARE

## 2023-02-21 VITALS
BODY MASS INDEX: 24.8 KG/M2 | WEIGHT: 140 LBS | OXYGEN SATURATION: 96 % | HEIGHT: 63 IN | HEART RATE: 86 BPM | RESPIRATION RATE: 18 BRPM | TEMPERATURE: 97.9 F | DIASTOLIC BLOOD PRESSURE: 72 MMHG | SYSTOLIC BLOOD PRESSURE: 132 MMHG

## 2023-02-21 DIAGNOSIS — M15.0 PRIMARY OSTEOARTHRITIS INVOLVING MULTIPLE JOINTS: ICD-10-CM

## 2023-02-21 DIAGNOSIS — G30.1 LATE ONSET ALZHEIMER'S DISEASE WITHOUT BEHAVIORAL DISTURBANCE (H): Primary | ICD-10-CM

## 2023-02-21 DIAGNOSIS — L28.0 NEURODERMATITIS: ICD-10-CM

## 2023-02-21 DIAGNOSIS — R00.0 TACHYCARDIA: ICD-10-CM

## 2023-02-21 DIAGNOSIS — I10 ESSENTIAL HYPERTENSION: ICD-10-CM

## 2023-02-21 DIAGNOSIS — E78.5 HYPERLIPIDEMIA, UNSPECIFIED HYPERLIPIDEMIA TYPE: ICD-10-CM

## 2023-02-21 DIAGNOSIS — F02.80 LATE ONSET ALZHEIMER'S DISEASE WITHOUT BEHAVIORAL DISTURBANCE (H): Primary | ICD-10-CM

## 2023-02-21 PROCEDURE — 99349 HOME/RES VST EST MOD MDM 40: CPT | Performed by: INTERNAL MEDICINE

## 2023-02-21 NOTE — LETTER
2/21/2023        RE: Edmundo James  [address] [place] [address]        Edmundo James is a 87 year old female seen February 21, 2023 at [facility] where she has resided for 3 and a half years (admit 9/2019) seen to follow up Alzheimer's dementia and OA with progressive debility.   Pt is seen in her apartment that she shares with her  Kenny, and he provides history   Pt has a long h/o OA in right hip and both knees.   She has not walked in over a year.  In October 2020 she could stand-pivot transfer but otherwise WC bound.   She was seen at [facility] in November and THIAGO planned, delayed by pandemic.  In the interim pt lost the ability to stand and requires mechanical lift for transfers.  So not a candidate for the THIAGO.    Seen by Orthopedics [facility], noted knees are bone on bone and likely will not benefit from further cortisone injections.          By chart review, pt has had a progressive cognitive and physical decline over the past several years.   She is no longer ambulatory and language is impaired as well.  Pt had not been seen by a medical provider for several years, until August 2021. Family has reported cognitive impairment that preceded the move to [place], she was on donepezil in [place], but no formal workup.   Donepezil discontinued secondary to side effects.   Pt had a symptomatic COVID 19 infection in early September 2021, then later that month she had an overnight stay at [facility] after she accidentally took her 's medications.  She was unresponsive with BP 52/27 initially.   She improved with IVF and was able to return to AL.       Past Medical History:   Diagnosis Date     Heart disease      HTN (hypertension)      Hyperlipidemia      OA (osteoarthritis) of knee      Uncomplicated asthma    Late onset Alzheimer's dementia  Depression /anxiety       Past Surgical History:   Procedure Laterality Date     CHOLECYSTECTOMY       LUMPECTOMY BREAST       TONSILLECTOMY    "    SH: Lives with her  BECKI Cox apartment. They moved to [place] from [place], [place] in 2019   Daughter Kandice lives locally and helps with cares.   Non smoker    ROS: sleeps through the night.   Still occ diarrhea   Weight in 2021 was 129 lbs     Wt Readings from Last 5 Encounters:   02/21/23 63.5 kg (140 lb)   01/17/23 62.1 kg (136 lb 12.8 oz)   11/18/22 61.7 kg (136 lb)   11/01/22 61.7 kg (136 lb)   09/06/22 62.9 kg (138 lb 11.2 oz)      EXAM: NAD, up to WC    /72   Pulse 86   Temp 97.9  F (36.6  C)   Resp 18   Ht 1.6 m (5' 3\")   Wt 63.5 kg (140 lb)   SpO2 96%   BMI 24.80 kg/m     Neck supple without adenopathy  Lungs clear bilaterally with fair air movement   Heart RRR s1s2  Abd soft, NT, no distention or guarding,+BS  Ext without edema.  Cracking and crepitus with ROM of knees, flexion contractures bilaterally    Neuro: WC bound, limited verbal skills  Psych: affect flat     Last Comprehensive Metabolic Panel:  Sodium   Date Value Ref Range Status   11/18/2022 140 136 - 145 mmol/L Final     Potassium   Date Value Ref Range Status   11/18/2022 4.1 3.4 - 5.3 mmol/L Final     Chloride   Date Value Ref Range Status   11/18/2022 102 98 - 107 mmol/L Final     Carbon Dioxide (CO2)   Date Value Ref Range Status   11/18/2022 26 22 - 29 mmol/L Final     Glucose   Date Value Ref Range Status   11/18/2022 90 70 - 99 mg/dL Final     Urea Nitrogen   Date Value Ref Range Status   11/18/2022 13.1 8.0 - 23.0 mg/dL Final     Creatinine   Date Value Ref Range Status   11/18/2022 0.51 0.51 - 0.95 mg/dL Final     GFR Estimate   Date Value Ref Range Status   11/18/2022 >90 >60 mL/min/1.73m2 Final     Calcium   Date Value Ref Range Status   11/18/2022 9.1 8.8 - 10.2 mg/dL Final     Lab Results   Component Value Date    AST 22 11/18/2022      ALBUMIN 4.0 11/18/2022      ALKPHOS 63 11/18/2022      Lab Results   Component Value Date    WBC 4.1 11/18/2022      HGB 13.5 11/18/2022      MCV 85 11/18/2022       11/18/2022 "     TSH   Date Value Ref Range Status   11/18/2022 3.72 0.30 - 4.20 uIU/mL Final   11/30/2021 3.03 0.40 - 4.00 mU/L Final      Vit D level 43       IMP/PLAN:   (M89.49) Primary osteoarthritis involving multiple joints    (M25.562,  G89.29) Chronic pain of left knee  (M25.551) Hip pain, right  Comment: EZesteban transfers bed to , not ambulatory  Contractures both knees     She has worked with [facility]   Plan:  Scheduled acetaminophen 1000 mg bid and PRN, diclofenac gel locally for pain management        (L28.0) Neurodermatitis  Comment: excoriation related to poor hygiene, scratching   Has improved of late     Plan: discontinue hydroxyzine secondary to side effects of confusion and lethargy    Continue local HC cream and moisturizing creams  Referred to Dermatology, but family has not followed through with making an appointment yet.     (R00.0) Tachycardia  Comment: abnormal EKG  Plan:  Ziopatch showed average HR 84, with single 5-beat run of VT and 3 episodes of SVT, longest being 16 seconds.    No further evaluation indicated unless symptomatic     (I10) Essential hypertension  Comment: normal renal function   BP Readings from Last 3 Encounters:   02/21/23 132/72   01/17/23 132/72   11/18/22 (!) 153/93      Plan: valsartan 60 mg/day  Follow bps and BMP     (G30.1,  F02.80) Late onset Alzheimer's disease without behavioral disturbance (H)  Comment: loss of mobility and language, very low functional status   Plan: AL and family support for med admin, meals, activity     (E78.5) Hyperlipidemia, unspecified hyperlipidemia type  Comment: no reported CV event   Plan: remains on atorvastatin 20 mg/day for primary prevention      Lary Sue MD         Sincerely,        Lary Sue MD

## 2023-03-03 NOTE — PROGRESS NOTES
S: lab results available    B: patient with 4 falls this week, declined ER evaluation.  UA without acute findings.  BMP and CBC available today    -most significant finding is hyponatremia, Na is 127    -most recent med change was addition of Bupropion  -has been on hydrochlorothiazide and Celexa for several months    A: vitals stable, no reports of falls since visit this Tuesday, patient without reports of any other symptoms    R: d'c valsartan hydrochlorothiazide combo pill  -taper and d'c Celexa    Facility Orders    1. d'c valsartan-hydrochlorothiazide combo pill  2. Valsartan 160mg po every day  3. BP daily X 1 week and update NP  4. Decrease Celexa to 10mg po every day X 7 days, then 5mg po every day X 7 days, then d'c  5. BMP Tuesday 12/14 dx: hyponatremia    NADINE Rogers CNP on 12/9/2021 at 8:16 PM      
Specialty Care (Immediate)...

## 2023-03-06 NOTE — PROGRESS NOTES
Edmundo James is a 87 year old female seen February 21, 2023 at [facility] where she has resided for 3 and a half years (admit 9/2019) seen to follow up Alzheimer's dementia and OA with progressive debility.   Pt is seen in her apartment that she shares with her  Kenny, and he provides history   Pt has a long h/o OA in right hip and both knees.   She has not walked in over a year.  In October 2020 she could stand-pivot transfer but otherwise WC bound.   She was seen at [facility] in November and THIAGO planned, delayed by pandemic.  In the interim pt lost the ability to stand and requires mechanical lift for transfers.  So not a candidate for the THIAGO.    Seen by Orthopedics MARK Burton, noted knees are bone on bone and likely will not benefit from further cortisone injections.          By chart review, pt has had a progressive cognitive and physical decline over the past several years.   She is no longer ambulatory and language is impaired as well.  Pt had not been seen by a medical provider for several years, until August 2021. Family has reported cognitive impairment that preceded the move to [place], she was on donepezil in [place], but no formal workup.   Donepezil discontinued secondary to side effects.   Pt had a symptomatic COVID 19 infection in early September 2021, then later that month she had an overnight stay at [facility] after she accidentally took her 's medications.  She was unresponsive with BP 52/27 initially.   She improved with IVF and was able to return to AL.       Past Medical History:   Diagnosis Date     Heart disease      HTN (hypertension)      Hyperlipidemia      OA (osteoarthritis) of knee      Uncomplicated asthma    Late onset Alzheimer's dementia  Depression /anxiety       Past Surgical History:   Procedure Laterality Date     CHOLECYSTECTOMY       LUMPECTOMY BREAST       TONSILLECTOMY       SH: Lives with her  BECKI Cox apartment. They moved to [place] from [place], [place] in 2019  "  Daughter Kandice lives locally and helps with cares.   Non smoker    ROS: sleeps through the night.   Still occ diarrhea   Weight in 2021 was 129 lbs     Wt Readings from Last 5 Encounters:   02/21/23 63.5 kg (140 lb)   01/17/23 62.1 kg (136 lb 12.8 oz)   11/18/22 61.7 kg (136 lb)   11/01/22 61.7 kg (136 lb)   09/06/22 62.9 kg (138 lb 11.2 oz)      EXAM: NAD, up to WC    /72   Pulse 86   Temp 97.9  F (36.6  C)   Resp 18   Ht 1.6 m (5' 3\")   Wt 63.5 kg (140 lb)   SpO2 96%   BMI 24.80 kg/m     Neck supple without adenopathy  Lungs clear bilaterally with fair air movement   Heart RRR s1s2  Abd soft, NT, no distention or guarding,+BS  Ext without edema.  Cracking and crepitus with ROM of knees, flexion contractures bilaterally    Neuro: WC bound, limited verbal skills  Psych: affect flat     Last Comprehensive Metabolic Panel:  Sodium   Date Value Ref Range Status   11/18/2022 140 136 - 145 mmol/L Final     Potassium   Date Value Ref Range Status   11/18/2022 4.1 3.4 - 5.3 mmol/L Final     Chloride   Date Value Ref Range Status   11/18/2022 102 98 - 107 mmol/L Final     Carbon Dioxide (CO2)   Date Value Ref Range Status   11/18/2022 26 22 - 29 mmol/L Final     Glucose   Date Value Ref Range Status   11/18/2022 90 70 - 99 mg/dL Final     Urea Nitrogen   Date Value Ref Range Status   11/18/2022 13.1 8.0 - 23.0 mg/dL Final     Creatinine   Date Value Ref Range Status   11/18/2022 0.51 0.51 - 0.95 mg/dL Final     GFR Estimate   Date Value Ref Range Status   11/18/2022 >90 >60 mL/min/1.73m2 Final     Calcium   Date Value Ref Range Status   11/18/2022 9.1 8.8 - 10.2 mg/dL Final     Lab Results   Component Value Date    AST 22 11/18/2022      ALBUMIN 4.0 11/18/2022      ALKPHOS 63 11/18/2022      Lab Results   Component Value Date    WBC 4.1 11/18/2022      HGB 13.5 11/18/2022      MCV 85 11/18/2022       11/18/2022     TSH   Date Value Ref Range Status   11/18/2022 3.72 0.30 - 4.20 uIU/mL Final "   11/30/2021 3.03 0.40 - 4.00 mU/L Final      Vit D level 43       IMP/PLAN:   (M89.49) Primary osteoarthritis involving multiple joints    (M25.562,  G89.29) Chronic pain of left knee  (M25.551) Hip pain, right  Comment: Patrick transfers bed to , not ambulatory  Contractures both knees     She has worked with [facility]   Plan:  Scheduled acetaminophen 1000 mg bid and PRN, diclofenac gel locally for pain management        (L28.0) Neurodermatitis  Comment: excoriation related to poor hygiene, scratching   Has improved of late     Plan: discontinue hydroxyzine secondary to side effects of confusion and lethargy    Continue local HC cream and moisturizing creams  Referred to Dermatology, but family has not followed through with making an appointment yet.     (R00.0) Tachycardia  Comment: abnormal EKG  Plan:  Ziopatch showed average HR 84, with single 5-beat run of VT and 3 episodes of SVT, longest being 16 seconds.    No further evaluation indicated unless symptomatic     (I10) Essential hypertension  Comment: normal renal function   BP Readings from Last 3 Encounters:   02/21/23 132/72   01/17/23 132/72   11/18/22 (!) 153/93      Plan: valsartan 60 mg/day  Follow bps and BMP     (G30.1,  F02.80) Late onset Alzheimer's disease without behavioral disturbance (H)  Comment: loss of mobility and language, very low functional status   Plan: AL and family support for med admin, meals, activity     (E78.5) Hyperlipidemia, unspecified hyperlipidemia type  Comment: no reported CV event   Plan: remains on atorvastatin 20 mg/day for primary prevention      Lary Sue MD

## 2023-03-27 NOTE — PROGRESS NOTES
[facility]    Chief Complaint   Patient presents with     RECHECK     HPI:  Edmundo James is a 87 year old  (1935), who is being seen today for an episodic care visit at: [facility] ([facility]) [[id number]]. Today's concern is:     Patient is an 87 year old female with  PHI significant for HTN, HLD, OA, depression, asthma, and dementia without official work-up.  She has extensive joint degeneration to her hips and knees and primarily w/c bound now.  Has been seen by [facility] for possible THIAGO.  Was initially planned to have surgery in February 2022 but with significant change in patient condition, surgery was cancelled.  She has struggled with anxiety/depressed mood but had significant hyponatremia with selective serotonin reuptake inhibitor, so this was discontinued and family not interested in trying other medication for mood given her reaction. Her mood has since improved     She is seen today per staff request due to ongoing itching.  This is not a new issue, and was very problematic in the past, did not respond to multiple interventions.  Eventually, hydroxyzine was started and this helped.  Itching seemed to go away and patient has been more agreeable for help with hygiene care, so hydroxyzine was tapered and d'cd.  Staff now reporting that itching is back.  They also report that patient having increased pain to knees, especially right knee with transfers    Patient seen in her apartment today.  Poor historian.  She reports itching to her back only at this time, unaware of new products or detergent being used.  She has a fragrant smelling lotion on today, and I discussed this could make it worse.  She also reports worsening pain to her knee, only with transfers.  Not using icy hot patch because it falls off so she refuses.  APAP often not given before she is gotten out of bed.  She has not tried sonya for transfer        Allergies, and PMH/PSH reviewed in EPIC today.  REVIEW OF  "SYSTEMS:  Limited secondary to cognitive impairment but as noted in HPI    Objective:   /72   Pulse 86   Temp 97.9  F (36.6  C)   Resp 18   Ht 1.6 m (5' 3\")   Wt 63.5 kg (140 lb)   BMI 24.80 kg/m    GENERAL APPEARANCE:  Alert, in no distress, cooperative, reading newspaper  RESP:  respiratory effort and palpation of chest normal, lungs clear to auscultation , no respiratory distress, occasional non-productive cough  CV:  Palpation and auscultation of heart done , regular rate and rhythm, no murmur, rub, or gallop, no edema  ABDOMEN:  no guarding or rebound, bowel sounds normal, no masses  M/S:   Gait and station abnormal w/c bound and EZ stand for transfers, impaired ROM to bilateral hips and kness, bilateral kness with flexion contractures and crepitus, kyphosis,   SKIN:  no open scabs or scratch marks noted today, back moisturized with fragrant lotion today  NEURO:   speech clear no facial asymmetry, normal strength and tone, no focal findings   PSYCH:  oriented to person and place, memory impaired , affect and mood normal, very little insight and judgement into her situation, poor historian     Recent labs in EPIC reviewed by me today.     Assessment/Plan:  (G30.1,  F02.80) Late onset Alzheimer's disease without behavioral disturbance (H)  (primary encounter diagnosis)  Comment: progressive, impaired language and no longer ambulatory, very poor insight into her situation anxious features at times, resistant to hygiene cares in past but with some recent improvement  -hydroxyzine was tapered and d'cd in February, but now with increased itching, this has also increased her anxiety. To note, she did not tolerate selective serotonin reuptake inhibitor's due to hyponatremia  Plan: staff assist with cares and dispense meds  -hydroxyzine low dose 12.5mg BID for anxiety and itching: multiple other modalities tried without success and this has improved her QOL, will restart at low dose.  Staff to update if not " effective, previously was on 25mg BID       (L28.0) Neurodermatitis  Comment: initially improved with improvement in hygiene and anxiety but significantly worsening with d'c of hydroxyzine and patient is itching herself raw again per staff, but I do not see evidence of this today.  Reports itching to back only, she does have fragrant smelling lotion on today  -was not able to get in with derm  Plan: as noted above, will try restart hydroxyzine to 12.5mg po BID and monitor  -continue anti-itch lotion and claritine  -discussed with staff to not use any fragrant lotion with her, inquire if new detergent being used    (I10) Essential hypertension  Comment:   BP Readings from Last 3 Encounters:   03/28/23 132/72   02/21/23 132/72   01/17/23 132/72     -reasonable control given age and goals of care  Plan: continue current POC and monitor     (M15.9) Primary osteoarthritis involving multiple joints  Comment: worsening pain to right knee with transfers and getting OOB in the morning, frequently APAP not being given prior to getting OOB  -flexion contractures to knees make EZ stand painful, patient unsure if wants to try sonya lift, I think this should be tried  Plan: continue voltaren gel  -d'c icy hot patch, patient refuses because it fall off, so will try icy hot gel  -staff to attempt to use sonya in place of EZ stand for transfers to see if less painful  -APAP to be given 30-45 minutes prior to transfers   -staff to update if not effective     HLD  Comment: no reported CV event  -research does not support added benefit of statin for primary prevention  Plan: d'c statin    MED REC REQUIRED{Post Medication Reconciliation Status:  Patient was not discharged from an inpatient facility or TCU      Electronically signed by: NADINE Rogers CNP

## 2023-03-28 ENCOUNTER — ASSISTED LIVING VISIT (OUTPATIENT)
Dept: GERIATRICS | Facility: CLINIC | Age: 88
End: 2023-03-28
Payer: MEDICARE

## 2023-03-28 VITALS
SYSTOLIC BLOOD PRESSURE: 132 MMHG | TEMPERATURE: 97.9 F | HEIGHT: 63 IN | DIASTOLIC BLOOD PRESSURE: 72 MMHG | RESPIRATION RATE: 18 BRPM | BODY MASS INDEX: 24.8 KG/M2 | HEART RATE: 86 BPM | WEIGHT: 140 LBS

## 2023-03-28 DIAGNOSIS — E78.5 HYPERLIPIDEMIA, UNSPECIFIED HYPERLIPIDEMIA TYPE: ICD-10-CM

## 2023-03-28 DIAGNOSIS — M15.0 PRIMARY OSTEOARTHRITIS INVOLVING MULTIPLE JOINTS: ICD-10-CM

## 2023-03-28 DIAGNOSIS — L28.0 NEURODERMATITIS: ICD-10-CM

## 2023-03-28 DIAGNOSIS — I10 ESSENTIAL HYPERTENSION: ICD-10-CM

## 2023-03-28 DIAGNOSIS — F02.80 LATE ONSET ALZHEIMER'S DISEASE WITHOUT BEHAVIORAL DISTURBANCE (H): Primary | ICD-10-CM

## 2023-03-28 DIAGNOSIS — G30.1 LATE ONSET ALZHEIMER'S DISEASE WITHOUT BEHAVIORAL DISTURBANCE (H): Primary | ICD-10-CM

## 2023-03-28 PROCEDURE — 99349 HOME/RES VST EST MOD MDM 40: CPT | Performed by: NURSE PRACTITIONER

## 2023-03-28 RX ORDER — MENTHOL 1 G/100G
1 POWDER TOPICAL 2 TIMES DAILY
Qty: 120 G | Refills: 4 | Status: SHIPPED | OUTPATIENT
Start: 2023-03-28 | End: 2023-01-01

## 2023-03-28 NOTE — LETTER
3/28/2023        RE: Edmundo James  [id number] [place] [address]        [facility]    Chief Complaint   Patient presents with     RECHECK     HPI:  Edmundo James is a 87 year old  (1935), who is being seen today for an episodic care visit at: [facility] [[id number]]. Today's concern is:     Patient is an 87 year old female with  PHI significant for HTN, HLD, OA, depression, asthma, and dementia without official work-up.  She has extensive joint degeneration to her hips and knees and primarily w/c bound now.  Has been seen by [facility] for possible THIAGO.  Was initially planned to have surgery in February 2022 but with significant change in patient condition, surgery was cancelled.  She has struggled with anxiety/depressed mood but had significant hyponatremia with selective serotonin reuptake inhibitor, so this was discontinued and family not interested in trying other medication for mood given her reaction. Her mood has since improved     She is seen today per staff request due to ongoing itching.  This is not a new issue, and was very problematic in the past, did not respond to multiple interventions.  Eventually, hydroxyzine was started and this helped.  Itching seemed to go away and patient has been more agreeable for help with hygiene care, so hydroxyzine was tapered and d'cd.  Staff now reporting that itching is back.  They also report that patient having increased pain to knees, especially right knee with transfers    Patient seen in her apartment today.  Poor historian.  She reports itching to her back only at this time, unaware of new products or detergent being used.  She has a fragrant smelling lotion on today, and I discussed this could make it worse.  She also reports worsening pain to her knee, only with transfers.  Not using icy hot patch because it falls off so she refuses.  APAP often not given before she is gotten out of bed.  She has  "not tried sonya for transfer        Allergies, and PMH/PSH reviewed in EPIC today.  REVIEW OF SYSTEMS:  Limited secondary to cognitive impairment but as noted in HPI    Objective:   /72   Pulse 86   Temp 97.9  F (36.6  C)   Resp 18   Ht 1.6 m (5' 3\")   Wt 63.5 kg (140 lb)   BMI 24.80 kg/m    GENERAL APPEARANCE:  Alert, in no distress, cooperative, reading newspaper  RESP:  respiratory effort and palpation of chest normal, lungs clear to auscultation , no respiratory distress, occasional non-productive cough  CV:  Palpation and auscultation of heart done , regular rate and rhythm, no murmur, rub, or gallop, no edema  ABDOMEN:  no guarding or rebound, bowel sounds normal, no masses  M/S:   Gait and station abnormal w/c bound and EZ stand for transfers, impaired ROM to bilateral hips and kness, bilateral kness with flexion contractures and crepitus, kyphosis,   SKIN:  no open scabs or scratch marks noted today, back moisturized with fragrant lotion today  NEURO:   speech clear no facial asymmetry, normal strength and tone, no focal findings   PSYCH:  oriented to person and place, memory impaired , affect and mood normal, very little insight and judgement into her situation, poor historian     Recent labs in [facility] reviewed by me today.     Assessment/Plan:  (G30.1,  F02.80) Late onset Alzheimer's disease without behavioral disturbance (H)  (primary encounter diagnosis)  Comment: progressive, impaired language and no longer ambulatory, very poor insight into her situation anxious features at times, resistant to hygiene cares in past but with some recent improvement  -hydroxyzine was tapered and d'cd in February, but now with increased itching, this has also increased her anxiety. To note, she did not tolerate selective serotonin reuptake inhibitor's due to hyponatremia  Plan: staff assist with cares and dispense meds  -hydroxyzine low dose 12.5mg BID for anxiety and itching: multiple other modalities tried " without success and this has improved her QOL, will restart at low dose.  Staff to update if not effective, previously was on 25mg BID       (L28.0) Neurodermatitis  Comment: initially improved with improvement in hygiene and anxiety but significantly worsening with d'c of hydroxyzine and patient is itching herself raw again per staff, but I do not see evidence of this today.  Reports itching to back only, she does have fragrant smelling lotion on today  -was not able to get in with derm  Plan: as noted above, will try restart hydroxyzine to 12.5mg po BID and monitor  -continue anti-itch lotion and claritine  -discussed with staff to not use any fragrant lotion with her, inquire if new detergent being used    (I10) Essential hypertension  Comment:   BP Readings from Last 3 Encounters:   03/28/23 132/72   02/21/23 132/72   01/17/23 132/72     -reasonable control given age and goals of care  Plan: continue current POC and monitor     (M15.9) Primary osteoarthritis involving multiple joints  Comment: worsening pain to right knee with transfers and getting OOB in the morning, frequently APAP not being given prior to getting OOB  -flexion contractures to knees make EZ stand painful, patient unsure if wants to try sonya lift, I think this should be tried  Plan: continue voltaren gel  -d'c icy hot patch, patient refuses because it fall off, so will try icy hot gel  -staff to attempt to use sonya in place of EZ stand for transfers to see if less painful  -APAP to be given 30-45 minutes prior to transfers   -staff to update if not effective     HLD  Comment: no reported CV event  -research does not support added benefit of statin for primary prevention  Plan: d'c statin    MED REC REQUIRED{Post Medication Reconciliation Status:  Patient was not discharged from an inpatient facility or TCU      Electronically signed by: NADINE Rogers CNP           Sincerely,        NADINE Rogers CNP

## 2023-04-01 DIAGNOSIS — L29.9 ITCHING: ICD-10-CM

## 2023-04-04 RX ORDER — HYDROXYZINE HYDROCHLORIDE 25 MG/1
TABLET, FILM COATED ORAL
Qty: 28 TABLET | Refills: 97 | Status: SHIPPED | OUTPATIENT
Start: 2023-04-04 | End: 2024-01-01

## 2023-05-02 ENCOUNTER — ASSISTED LIVING VISIT (OUTPATIENT)
Dept: GERIATRICS | Facility: CLINIC | Age: 88
End: 2023-05-02
Payer: MEDICARE

## 2023-05-02 VITALS
SYSTOLIC BLOOD PRESSURE: 132 MMHG | RESPIRATION RATE: 18 BRPM | BODY MASS INDEX: 24.8 KG/M2 | HEART RATE: 86 BPM | WEIGHT: 140 LBS | DIASTOLIC BLOOD PRESSURE: 72 MMHG | HEIGHT: 63 IN | TEMPERATURE: 97.9 F

## 2023-05-02 DIAGNOSIS — I10 ESSENTIAL HYPERTENSION: ICD-10-CM

## 2023-05-02 DIAGNOSIS — L29.9 ITCHING: ICD-10-CM

## 2023-05-02 DIAGNOSIS — F41.9 ANXIETY: ICD-10-CM

## 2023-05-02 DIAGNOSIS — M15.0 PRIMARY OSTEOARTHRITIS INVOLVING MULTIPLE JOINTS: ICD-10-CM

## 2023-05-02 DIAGNOSIS — E78.5 HYPERLIPIDEMIA, UNSPECIFIED HYPERLIPIDEMIA TYPE: ICD-10-CM

## 2023-05-02 DIAGNOSIS — D64.9 ANEMIA, UNSPECIFIED TYPE: ICD-10-CM

## 2023-05-02 DIAGNOSIS — G30.1 LATE ONSET ALZHEIMER'S DISEASE WITHOUT BEHAVIORAL DISTURBANCE (H): ICD-10-CM

## 2023-05-02 DIAGNOSIS — G89.29 CHRONIC PAIN OF RIGHT KNEE: ICD-10-CM

## 2023-05-02 DIAGNOSIS — L28.0 NEURODERMATITIS: ICD-10-CM

## 2023-05-02 DIAGNOSIS — J30.1 ALLERGIC RHINITIS DUE TO POLLEN, UNSPECIFIED SEASONALITY: Primary | ICD-10-CM

## 2023-05-02 DIAGNOSIS — F02.80 LATE ONSET ALZHEIMER'S DISEASE WITHOUT BEHAVIORAL DISTURBANCE (H): ICD-10-CM

## 2023-05-02 DIAGNOSIS — M25.561 CHRONIC PAIN OF RIGHT KNEE: ICD-10-CM

## 2023-05-02 PROCEDURE — 99349 HOME/RES VST EST MOD MDM 40: CPT | Performed by: NURSE PRACTITIONER

## 2023-05-02 RX ORDER — FLUTICASONE PROPIONATE 50 MCG
2 SPRAY, SUSPENSION (ML) NASAL DAILY
Qty: 9.9 ML | Refills: 3 | Status: SHIPPED | OUTPATIENT
Start: 2023-05-02 | End: 2024-01-01

## 2023-05-02 NOTE — LETTER
5/2/2023        RE: Edmundo James  [address] [place] [address]        [facility]    Chief Complaint   Patient presents with     RECHECK     HPI:  Edmundo James is a 87 year old  (1935), who is being seen today for an episodic care visit at: [facility] ([facility] [[id number]]. Today's concern is:     Patient is an 87 year old female with  PHI significant for HTN, HLD, OA, depression, asthma, and dementia without official work-up.  She has extensive joint degeneration to her hips and knees and primarily w/c bound now.  Has been seen by [facility] for possible THIAGO.  Was initially planned to have surgery in February 2022 but with significant change in patient condition, surgery was cancelled.  She has struggled with anxiety/depressed mood but had significant hyponatremia with selective serotonin reuptake inhibitor, so this was discontinued and family not interested in trying other medication for mood given her reaction. Her mood has since improved     She is seen today to follow-up on her co-morbidities.  Overall she reports feeling well.  Itching has resolved since restart of hydroxyzine.  Complains of runny nose and cough with that recently.  Has not tried Flonase and would like to.  She is on Claritin already.  Denies CP, shortness of breath, n/v, abdominal pain.  Ongoing pain to her knees, right more severe than left, most bothersome with transfers.  Staff use EZ stand, she has bone on bone to right knee.  She does not want to use sonya lift and is ok with pain with transfers because it is temporary.   is helping give APAP in early morning 45 minutes prior to staff getting her up and that is helpful.  They have not tried using the icy hot with that and agreeable to try.  Weight has been stable, mood ok.   present during visit and without other concerns today.      Allergies, and PMH/PSH reviewed in [facility] today.  REVIEW OF SYSTEMS:  Limited secondary  "to cognitive impairment but as noted in HPI    Objective:   /72   Pulse 86   Temp 97.9  F (36.6  C)   Resp 18   Ht 1.6 m (5' 3\")   Wt 63.5 kg (140 lb)   BMI 24.80 kg/m    GENERAL APPEARANCE:  Alert, in no distress, cooperative, watching tv  RESP:  respiratory effort and palpation of chest normal, lungs clear to auscultation , no respiratory distress, no cough or wheeze  CV:  Palpation and auscultation of heart done , regular rate and rhythm, no murmur, rub, or gallop, trace edema, more prominent to LLE  ABDOMEN:  no guarding or rebound, bowel sounds normal, no masses  M/S:   Gait and station abnormal w/c bound and EZ stand for transfers, impaired ROM to bilateral hips and kness, bilateral kness with flexion contractures and crepitus, kyphosis,   SKIN:  no open scabs or scratch marks noted   NEURO:   speech clear no facial asymmetry, normal strength and tone, no focal findings   PSYCH:  oriented to person and place, memory impaired , affect and mood normal, very little insight and judgement into her situation, poor historian    Recent labs in EPIC reviewed by me today.     Assessment/Plan:  (J30.1) Allergic rhinitis due to pollen, unspecified seasonality  (primary encounter diagnosis)  Comment: bothersome, coughs with post nasal drip  Plan: fluticasone (FLONASE) 50 MCG/ACT nasal spray            (G30.1,  F02.80) Late onset Alzheimer's disease without behavioral disturbance (H)  (F41.9) Anxiety  Comment: progressive, impaired language and no longer ambulatory, very poor insight into her situation anxious features at times, resistant to hygiene cares in past but none recently reported  -hydroxyzine was tapered and d'cd in February, but patient with increased itching, hydroxyzine low dose restarted and has helped relieved itching, anxiety also resolved. To note, she did not tolerate selective serotonin reuptake inhibitor's due to hyponatremia  Plan: staff assist with cares and dispense meds  -hydroxyzine low " dose 12.5mg BID for anxiety and itching: multiple other modalities tried without success and this has improved her QOL, will restart at low dose.  Staff to update if not effective, previously was on 25mg BID        (L28.0) Neurodermatitis  (L29.9) Itching  Comment: initially improved with improvement in hygiene and anxiety but significantly worsening with d'c of hydroxyzine and patient is itching herself raw again so was restarted end of March, itching resolved   -was not able to get in with derm  Plan: continue hydroxyzine to 12.5mg po BID and monitor; family aware of potential negative s/e in older adults and with dementia but feel benefit outweigh risks   -continue anti-itch lotion and claritine      (I10) Essential hypertension  Comment: 130/78 today  BP Readings from Last 3 Encounters:   05/02/23 132/72   03/28/23 132/72   02/21/23 132/72     Plan: staff to monitor BP daily X 1 week and update me  -continue valsartan at this time  BMP next week    (M15.9) Primary osteoarthritis involving multiple joints  (M25.561,  G89.29) Chronic pain of right knee  Comment: worsening pain to right knee with transfers and getting OOB in the morning, APAP given by  in early morning which is helpful  -flexion contractures to knees make EZ stand painful, patient refuses to use sonya and states she would rather tolerate pain  Plan: continue voltaren gel  -icy hot gel BID; will write for staff to allow  to place in A.M when he given APAP  -on vitamin d, will check level with labs next week     (E78.5) Hyperlipidemia, unspecified hyperlipidemia type  Comment: statin was d'cd 3/28 as research does not support statin for primary prevention   Plan: lipid panel with labs next week       (D64.9) Anemia, unspecified type  Comment: per history, no reports of acute blood loss  Plan: CBC with labs next week     MED REC REQUIREDPost Medication Reconciliation Status:  Patient was not discharged from an inpatient facility or  TCU      Electronically signed by: NADINE Rogers CNP             Sincerely,        NADINE Rogers CNP

## 2023-05-02 NOTE — PROGRESS NOTES
[facility] GERIATRICS    Chief Complaint   Patient presents with     RECHECK     HPI:  Edmundo James is a 87 year old  (1935), who is being seen today for an episodic care visit at: [facility] ([facility]) [[id number]]. Today's concern is:     Patient is an 87 year old female with  PHI significant for HTN, HLD, OA, depression, asthma, and dementia without official work-up.  She has extensive joint degeneration to her hips and knees and primarily w/c bound now.  Has been seen by [facility] for possible THIAGO.  Was initially planned to have surgery in February 2022 but with significant change in patient condition, surgery was cancelled.  She has struggled with anxiety/depressed mood but had significant hyponatremia with selective serotonin reuptake inhibitor, so this was discontinued and family not interested in trying other medication for mood given her reaction. Her mood has since improved     She is seen today to follow-up on her co-morbidities.  Overall she reports feeling well.  Itching has resolved since restart of hydroxyzine.  Complains of runny nose and cough with that recently.  Has not tried Flonase and would like to.  She is on Claritin already.  Denies CP, shortness of breath, n/v, abdominal pain.  Ongoing pain to her knees, right more severe than left, most bothersome with transfers.  Staff use EZ stand, she has bone on bone to right knee.  She does not want to use sonya lift and is ok with pain with transfers because it is temporary.   is helping give APAP in early morning 45 minutes prior to staff getting her up and that is helpful.  They have not tried using the icy hot with that and agreeable to try.  Weight has been stable, mood ok.   present during visit and without other concerns today.      Allergies, and PMH/PSH reviewed in [facility] today.  REVIEW OF SYSTEMS:  Limited secondary to cognitive impairment but as noted in HPI    Objective:   /72   Pulse 86   Temp  "97.9  F (36.6  C)   Resp 18   Ht 1.6 m (5' 3\")   Wt 63.5 kg (140 lb)   BMI 24.80 kg/m    GENERAL APPEARANCE:  Alert, in no distress, cooperative, watching tv  RESP:  respiratory effort and palpation of chest normal, lungs clear to auscultation , no respiratory distress, no cough or wheeze  CV:  Palpation and auscultation of heart done , regular rate and rhythm, no murmur, rub, or gallop, trace edema, more prominent to LLE  ABDOMEN:  no guarding or rebound, bowel sounds normal, no masses  M/S:   Gait and station abnormal w/c bound and EZ stand for transfers, impaired ROM to bilateral hips and kness, bilateral kness with flexion contractures and crepitus, kyphosis,   SKIN:  no open scabs or scratch marks noted   NEURO:   speech clear no facial asymmetry, normal strength and tone, no focal findings   PSYCH:  oriented to person and place, memory impaired , affect and mood normal, very little insight and judgement into her situation, poor historian    Recent labs in [facility] reviewed by me today.     Assessment/Plan:  (J30.1) Allergic rhinitis due to pollen, unspecified seasonality  (primary encounter diagnosis)  Comment: bothersome, coughs with post nasal drip  Plan: fluticasone (FLONASE) 50 MCG/ACT nasal spray            (G30.1,  F02.80) Late onset Alzheimer's disease without behavioral disturbance (H)  (F41.9) Anxiety  Comment: progressive, impaired language and no longer ambulatory, very poor insight into her situation anxious features at times, resistant to hygiene cares in past but none recently reported  -hydroxyzine was tapered and d'cd in February, but patient with increased itching, hydroxyzine low dose restarted and has helped relieved itching, anxiety also resolved. To note, she did not tolerate selective serotonin reuptake inhibitor's due to hyponatremia  Plan: staff assist with cares and dispense meds  -hydroxyzine low dose 12.5mg BID for anxiety and itching: multiple other modalities tried without success " and this has improved her QOL, will restart at low dose.  Staff to update if not effective, previously was on 25mg BID        (L28.0) Neurodermatitis  (L29.9) Itching  Comment: initially improved with improvement in hygiene and anxiety but significantly worsening with d'c of hydroxyzine and patient is itching herself raw again so was restarted end of March, itching resolved   -was not able to get in with derm  Plan: continue hydroxyzine to 12.5mg po BID and monitor; family aware of potential negative s/e in older adults and with dementia but feel benefit outweigh risks   -continue anti-itch lotion and claritine      (I10) Essential hypertension  Comment: 130/78 today  BP Readings from Last 3 Encounters:   05/02/23 132/72   03/28/23 132/72   02/21/23 132/72     Plan: staff to monitor BP daily X 1 week and update me  -continue valsartan at this time  BMP next week    (M15.9) Primary osteoarthritis involving multiple joints  (M25.561,  G89.29) Chronic pain of right knee  Comment: worsening pain to right knee with transfers and getting OOB in the morning, APAP given by  in early morning which is helpful  -flexion contractures to knees make EZ stand painful, patient refuses to use sonya and states she would rather tolerate pain  Plan: continue voltaren gel  -icy hot gel BID; will write for staff to allow  to place in A.M when he given APAP  -on vitamin d, will check level with labs next week     (E78.5) Hyperlipidemia, unspecified hyperlipidemia type  Comment: statin was d'cd 3/28 as research does not support statin for primary prevention   Plan: lipid panel with labs next week       (D64.9) Anemia, unspecified type  Comment: per history, no reports of acute blood loss  Plan: CBC with labs next week     MED REC REQUIREDPost Medication Reconciliation Status:  Patient was not discharged from an inpatient facility or TCU      Electronically signed by: NADINE Rogers CNP

## 2023-05-08 ENCOUNTER — LAB REQUISITION (OUTPATIENT)
Dept: LAB | Facility: CLINIC | Age: 88
End: 2023-05-08
Payer: MEDICARE

## 2023-05-08 DIAGNOSIS — I10 ESSENTIAL (PRIMARY) HYPERTENSION: ICD-10-CM

## 2023-05-08 DIAGNOSIS — E78.5 HYPERLIPIDEMIA, UNSPECIFIED: ICD-10-CM

## 2023-05-15 ENCOUNTER — LAB REQUISITION (OUTPATIENT)
Dept: LAB | Facility: CLINIC | Age: 88
End: 2023-05-15
Payer: MEDICARE

## 2023-05-15 DIAGNOSIS — E55.9 VITAMIN D DEFICIENCY, UNSPECIFIED: ICD-10-CM

## 2023-05-15 DIAGNOSIS — E78.5 HYPERLIPIDEMIA, UNSPECIFIED: ICD-10-CM

## 2023-05-15 DIAGNOSIS — I10 ESSENTIAL (PRIMARY) HYPERTENSION: ICD-10-CM

## 2023-05-16 LAB
ANION GAP SERPL CALCULATED.3IONS-SCNC: 10 MMOL/L (ref 7–15)
BUN SERPL-MCNC: 12.2 MG/DL (ref 8–23)
CALCIUM SERPL-MCNC: 9.3 MG/DL (ref 8.8–10.2)
CHLORIDE SERPL-SCNC: 99 MMOL/L (ref 98–107)
CHOLEST SERPL-MCNC: 261 MG/DL
CREAT SERPL-MCNC: 0.5 MG/DL (ref 0.51–0.95)
DEPRECATED HCO3 PLAS-SCNC: 27 MMOL/L (ref 22–29)
ERYTHROCYTE [DISTWIDTH] IN BLOOD BY AUTOMATED COUNT: 14.7 % (ref 10–15)
GFR SERPL CREATININE-BSD FRML MDRD: >90 ML/MIN/1.73M2
GLUCOSE SERPL-MCNC: 66 MG/DL (ref 70–99)
HCT VFR BLD AUTO: 41.7 % (ref 35–47)
HDLC SERPL-MCNC: 56 MG/DL
HGB BLD-MCNC: 13.4 G/DL (ref 11.7–15.7)
LDLC SERPL CALC-MCNC: 176 MG/DL
MCH RBC QN AUTO: 28.8 PG (ref 26.5–33)
MCHC RBC AUTO-ENTMCNC: 32.1 G/DL (ref 31.5–36.5)
MCV RBC AUTO: 90 FL (ref 78–100)
NONHDLC SERPL-MCNC: 205 MG/DL
PLATELET # BLD AUTO: 257 10E3/UL (ref 150–450)
POTASSIUM SERPL-SCNC: 3.7 MMOL/L (ref 3.4–5.3)
RBC # BLD AUTO: 4.66 10E6/UL (ref 3.8–5.2)
SODIUM SERPL-SCNC: 136 MMOL/L (ref 136–145)
TRIGL SERPL-MCNC: 144 MG/DL
WBC # BLD AUTO: 3.6 10E3/UL (ref 4–11)

## 2023-05-16 PROCEDURE — 80048 BASIC METABOLIC PNL TOTAL CA: CPT | Mod: ORL | Performed by: NURSE PRACTITIONER

## 2023-05-16 PROCEDURE — 85027 COMPLETE CBC AUTOMATED: CPT | Mod: ORL | Performed by: NURSE PRACTITIONER

## 2023-05-16 PROCEDURE — 80061 LIPID PANEL: CPT | Mod: ORL | Performed by: NURSE PRACTITIONER

## 2023-05-16 PROCEDURE — 82306 VITAMIN D 25 HYDROXY: CPT | Mod: ORL | Performed by: NURSE PRACTITIONER

## 2023-05-17 LAB — DEPRECATED CALCIDIOL+CALCIFEROL SERPL-MC: 49 UG/L (ref 20–75)

## 2023-07-05 NOTE — PROGRESS NOTES
"[facility] GERIATRICS    Chief Complaint   Patient presents with     RECHECK     HPI:  Edmundo James is a 87 year old  (1935), who is being seen today for an episodic care visit at: [facility] ([facility]) [[id number]]. Today's concern is:     Patient is an 87 year old female with  PHI significant for HTN, HLD, OA, depression, asthma, and dementia without official work-up.  She has extensive joint degeneration to her hips and knees and primarily w/c bound now.  Has been seen by [facility] for possible THIAGO.  Was initially planned to have surgery in February 2022 but with significant change in patient condition, surgery was cancelled.  She has struggled with anxiety/depressed mood but had significant hyponatremia with selective serotonin reuptake inhibitor, so this was discontinued and family not interested in trying other medication for mood given her reaction. Her mood has since improved     She is seen today to follow-up on her co-morbidities.  Her , Kenny, provides much of the history today.  He reports patient has been coughing more, especially in the morning and wheezing with more shortness of breath when staff are getting her up and ready.  No reports of hypoxia or difficulties breathing from staff today. She has a history of asthma and has been on a controller medication in the past but none recently.  Has not utilized prn albuterol inhaler.  Pain has been more controlled with the morning with  giving scheduled APAP early morning.  Itching has also been much improved with restart of low dose hydroxyzine.  When was tapered and dc'd she suffered from severe itching again.  No other concerns reported today    Allergies, and PMH/PSH reviewed in [facility] today.  REVIEW OF SYSTEMS:  Limited secondary to cognitive impairment but as noted in HPI    Objective:   /88   Pulse 76   Temp 97.2  F (36.2  C)   Resp 18   Ht 1.6 m (5' 3\")   Wt 63.5 kg (140 lb)   BMI 24.80 kg/m    GENERAL " APPEARANCE:  Alert, in no distress, cooperative, watching tv  RESP:  respiratory effort and palpation of chest normal, lungs clear to auscultation , no respiratory distress, no cough or wheeze during encounter  CV:  Palpation and auscultation of heart done , regular rate and rhythm, no murmur, rub, or gallop, trace edema, more prominent to LLE  ABDOMEN:  no guarding or rebound, bowel sounds normal, no masses  M/S:   Gait and station abnormal w/c bound and EZ stand for transfers, impaired ROM to bilateral hips and kness, bilateral kness with flexion contractures and crepitus, kyphosis,   SKIN:  no open scabs or scratch marks noted   NEURO:   speech clear no facial asymmetry,  no focal findings   PSYCH:  oriented to person and place, memory impaired , affect and mood normal, very little insight and judgement into her situation, poor historian    Recent labs in [facility] reviewed by me today.     Assessment/Plan:  (J45.20) Mild intermittent asthma without complication  (primary encounter diagnosis)  (J30.1) Allergic rhinitis due to pollen, unspecified seasonality  Comment: as noted in HPI reports of increased cough, some wheezing and shortness of breath especially in the morning with exertion of staff getting her ready  -also with allergies  -has been on controlled med in the past but none recent  Plan: budesonide-formoterol (SYMBICORT) 160-4.5         MCG/ACT Inhaler        -continue flonase and claritin  -prn aluterol     (G30.1,  F02.80) Late onset Alzheimer's disease without behavioral disturbance (H)  Comment: progressive, impaired language and no longer ambulatory, very poor insight into her situation anxious features at times, resistant to hygiene cares in past but none recently reported  -hydroxyzine was tapered and d'cd in February, but patient with increased itching, hydroxyzine low dose restarted and has helped relieved itching, anxiety also resolved. To note, she did not tolerate selective serotonin reuptake  inhibitor's due to hyponatremia  Plan: staff assist with cares and dispense meds  -hydroxyzine low dose 12.5mg BID for anxiety and itching: multiple other modalities tried without success and this has improved her QOL, will restart at low dose.  Staff to update if not effective, previously was on 25mg BID     (L28.0) Neurodermatitis  Comment: initially improved with improvement in hygiene and anxiety but significantly worsening with d'c of hydroxyzine and patient was itching herself raw again so was restarted end of March, itching resolved  -was not able to get in with derm  Plan: continue hydroxyzine to 12.5mg po BID and monitor; family aware of potential negative s/e in older adults and with dementia but feel benefit outweigh risks   -continue anti-itch lotion and claritin      (I10) Essential hypertension  Comment: given age and goals of care goal SBP <150, patient meeting goal  Plan: continue valsartan    (M15.9) Primary osteoarthritis involving multiple joints  Comment: had worsening pain to right knee with transfers and getting OOB in the morning, APAP given by  in early morning which has significantly helped this  -flexion contractures to knees make EZ stand painful, patient refuses to use sonya and states she would rather tolerate pain  Plan: continue voltaren gel  -icy hot gel BID; will write for staff to allow  to place in A.M when he given APAP  -on vitamin d    MED REC REQUIRED{Post Medication Reconciliation Status:  Patient was not discharged from an inpatient facility or TCU      Electronically signed by: NADINE Rogers CNP

## 2023-07-06 NOTE — LETTER
7/6/2023        RE: Edmundo James  [address] [place] [address]        [facility] GERIATRICS    Chief Complaint   Patient presents with     RECHECK     HPI:  Edmundo James is a 87 year old  (1935), who is being seen today for an episodic care visit at: [facility] [[id number]]. Today's concern is:     Patient is an 87 year old female with  PHI significant for HTN, HLD, OA, depression, asthma, and dementia without official work-up.  She has extensive joint degeneration to her hips and knees and primarily w/c bound now.  Has been seen by [facility] for possible THIAGO.  Was initially planned to have surgery in February 2022 but with significant change in patient condition, surgery was cancelled.  She has struggled with anxiety/depressed mood but had significant hyponatremia with selective serotonin reuptake inhibitor, so this was discontinued and family not interested in trying other medication for mood given her reaction. Her mood has since improved     She is seen today to follow-up on her co-morbidities.  Her , Kenny, provides much of the history today.  He reports patient has been coughing more, especially in the morning and wheezing with more shortness of breath when staff are getting her up and ready.  No reports of hypoxia or difficulties breathing from staff today. She has a history of asthma and has been on a controller medication in the past but none recently.  Has not utilized prn albuterol inhaler.  Pain has been more controlled with the morning with  giving scheduled APAP early morning.  Itching has also been much improved with restart of low dose hydroxyzine.  When was tapered and dc'd she suffered from severe itching again.  No other concerns reported today    Allergies, and PMH/PSH reviewed in [facility] today.  REVIEW OF SYSTEMS:  Limited secondary to cognitive impairment but as noted in HPI    Objective:   /88   Pulse 76   Temp 97.2  F  "(36.2  C)   Resp 18   Ht 1.6 m (5' 3\")   Wt 63.5 kg (140 lb)   BMI 24.80 kg/m    GENERAL APPEARANCE:  Alert, in no distress, cooperative, watching tv  RESP:  respiratory effort and palpation of chest normal, lungs clear to auscultation , no respiratory distress, no cough or wheeze during encounter  CV:  Palpation and auscultation of heart done , regular rate and rhythm, no murmur, rub, or gallop, trace edema, more prominent to LLE  ABDOMEN:  no guarding or rebound, bowel sounds normal, no masses  M/S:   Gait and station abnormal w/c bound and EZ stand for transfers, impaired ROM to bilateral hips and kness, bilateral kness with flexion contractures and crepitus, kyphosis,   SKIN:  no open scabs or scratch marks noted   NEURO:   speech clear no facial asymmetry,  no focal findings   PSYCH:  oriented to person and place, memory impaired , affect and mood normal, very little insight and judgement into her situation, poor historian    Recent labs in [facility] reviewed by me today.     Assessment/Plan:  (J45.20) Mild intermittent asthma without complication  (primary encounter diagnosis)  (J30.1) Allergic rhinitis due to pollen, unspecified seasonality  Comment: as noted in HPI reports of increased cough, some wheezing and shortness of breath especially in the morning with exertion of staff getting her ready  -also with allergies  -has been on controlled med in the past but none recent  Plan: budesonide-formoterol (SYMBICORT) 160-4.5         MCG/ACT Inhaler        -continue flonase and claritin  -prn aluterol     (G30.1,  F02.80) Late onset Alzheimer's disease without behavioral disturbance (H)  Comment: progressive, impaired language and no longer ambulatory, very poor insight into her situation anxious features at times, resistant to hygiene cares in past but none recently reported  -hydroxyzine was tapered and d'cd in February, but patient with increased itching, hydroxyzine low dose restarted and has helped relieved " itching, anxiety also resolved. To note, she did not tolerate selective serotonin reuptake inhibitor's due to hyponatremia  Plan: staff assist with cares and dispense meds  -hydroxyzine low dose 12.5mg BID for anxiety and itching: multiple other modalities tried without success and this has improved her QOL, will restart at low dose.  Staff to update if not effective, previously was on 25mg BID     (L28.0) Neurodermatitis  Comment: initially improved with improvement in hygiene and anxiety but significantly worsening with d'c of hydroxyzine and patient was itching herself raw again so was restarted end of March, itching resolved  -was not able to get in with derm  Plan: continue hydroxyzine to 12.5mg po BID and monitor; family aware of potential negative s/e in older adults and with dementia but feel benefit outweigh risks   -continue anti-itch lotion and claritin      (I10) Essential hypertension  Comment: given age and goals of care goal SBP <150, patient meeting goal  Plan: continue valsartan    (M15.9) Primary osteoarthritis involving multiple joints  Comment: had worsening pain to right knee with transfers and getting OOB in the morning, APAP given by  in early morning which has significantly helped this  -flexion contractures to knees make EZ stand painful, patient refuses to use sonya and states she would rather tolerate pain  Plan: continue voltaren gel  -icy hot gel BID; will write for staff to allow  to place in A.M when he given APAP  -on vitamin d    MED REC REQUIRED{Post Medication Reconciliation Status:  Patient was not discharged from an inpatient facility or TCU      Electronically signed by: NADINE Rogers CNP             Sincerely,        NADINE Rogers CNP

## 2023-08-02 NOTE — TELEPHONE ENCOUNTER
"[facility] Triage Nurse Telephone Encounter    Provider: NADINE Kirk CNP  Facility: [facility]  Facility Type:  [place]    Caller: Taj  Call Back Number: [phone]    Allergies:    Allergies   Allergen Reactions    Cats Other (See Comments)     Runny nose, watery eyes     Dogs Other (See Comments)     Runny nose and watery eyes     Mold Other (See Comments)     Runny nose, watery eyes         Reason for call: Nurse called to report chest x-ray results.  Patient continues with \"wet\" cough with minimal sputum.  Right lobe has crackles.  VS: 178/91, 91, 20, 97.5, and O2 93% on RA.  Patient does not appear in distress.          Verbal Order/Direction given by Provider: Start Azithromycin 500 mg po daily x 3 days.  Update if condition worsens.      Provider giving Order:  Lary Sue MD    Verbal Order given to: Taj Mendieta, RN      "

## 2023-08-09 NOTE — PROGRESS NOTES
"[facility] GERIATRICS    Chief Complaint   Patient presents with    Nursing Home Acute    RECHECK     HPI:  Edmundo James is a 87 year old  (1935), who is being seen today for an episodic care visit at: [facility] ([facility]) [[id number]].     Patient seen today for check of the following medical issues:    1. Pneumonia of right lower lobe due to infectious organism    2. Mild intermittent asthma without complication    3. Allergic rhinitis due to pollen, unspecified seasonality       - Nursing reports patient was started on 3 day course of azithromycin by on call physician for CXR results on 8/2 of mild patchy density in RLL compatible with pneumonia in setting of reports of patient with \"wet cough\" and right lobe crackles. Azithromycin course has been completed.     Patient found today in A/L apartment sitting up in w/c. Alert, calm, NAD. Reports is feeling \"better\" then adds \"I never felt sick\".  is also present and reports she had a worsening cough. Patient and  report patient with h/o asthma and does have a dry cough at baseline. Patient denies SOB, fever or chills. Reports appetite is baseline    Allergies, and PMH/PSH reviewed in [facility] today.  REVIEW OF SYSTEMS:  4 point ROS including Respiratory, CV, GI and , other than that noted in the HPI,  is negative    Objective:   BP (!) 150/80   Pulse 81   Temp 97.5  F (36.4  C)   Resp 18   Ht 1.6 m (5' 3\")   Wt 65.8 kg (145 lb 1.6 oz)   SpO2 93%   BMI 25.70 kg/m      Resp: Effort WNL, LSCTA - some upper airway wheezing noted  CV: VS as above, no edema noted  Abd- soft, nontender, BS +  Musc- ANDREA- in w/c, station WNL  Psych- alert, calm, pleasant      Recent labs in [facility] reviewed by me today.     Assessment/Plan:  Pneumonia of right lower lobe due to infectious organism  - unlcear if actually pna vs asthma exacerbation. Completed 3 day Azithromycin course. Afebrile and resp status reportedly baseline.   - would not " extend or elaborate on antibiotics at this time  - ongoing monitoring of resp status, VS    Mild intermittent asthma without complication  Chronic- slight very upper airway expiratory wheeze- resp status otherwise stable  - continues on budesonide-formoterol, albuterol and nasal fluticasone  Allergic rhinitis due to pollen, unspecified seasonality  - chronic, could be a factor  - continues on loratadine    Discussed above with nursing      Electronically signed by: NADINE Quezada CNP

## 2023-08-09 NOTE — LETTER
"    8/9/2023        RE: Edmundo James  [address] [place] [address]        [facility]    Chief Complaint   Patient presents with     Nursing Home Acute     RECHECK     HPI:  Edmundo James is a 87 year old  (1935), who is being seen today for an episodic care visit at: [facility] ([facility]) [[id number]].     Patient seen today for check of the following medical issues:    1. Pneumonia of right lower lobe due to infectious organism    2. Mild intermittent asthma without complication    3. Allergic rhinitis due to pollen, unspecified seasonality       - Nursing reports patient was started on 3 day course of azithromycin by on call physician for CXR results on 8/2 of mild patchy density in RLL compatible with pneumonia in setting of reports of patient with \"wet cough\" and right lobe crackles. Azithromycin course has been completed.     Patient found today in A/L apartment sitting up in w/c. Alert, calm, NAD. Reports is feeling \"better\" then adds \"I never felt sick\".  is also present and reports she had a worsening cough. Patient and  report patient with h/o asthma and does have a dry cough at baseline. Patient denies SOB, fever or chills. Reports appetite is baseline    Allergies, and PMH/PSH reviewed in [facility] today.  REVIEW OF SYSTEMS:  4 point ROS including Respiratory, CV, GI and , other than that noted in the HPI,  is negative    Objective:   BP (!) 150/80   Pulse 81   Temp 97.5  F (36.4  C)   Resp 18   Ht 1.6 m (5' 3\")   Wt 65.8 kg (145 lb 1.6 oz)   SpO2 93%   BMI 25.70 kg/m      Resp: Effort WNL, LSCTA - some upper airway wheezing noted  CV: VS as above, no edema noted  Abd- soft, nontender, BS +  Musc- ANDREA- in w/c, station WNL  Psych- alert, calm, pleasant      Recent labs in [facility] reviewed by me today.     Assessment/Plan:  Pneumonia of right lower lobe due to infectious organism  - unlcear if actually pna vs asthma exacerbation. " Completed 3 day Azithromycin course. Afebrile and resp status reportedly baseline.   - would not extend or elaborate on antibiotics at this time  - ongoing monitoring of resp status, VS    Mild intermittent asthma without complication  Chronic- slight very upper airway expiratory wheeze- resp status otherwise stable  - continues on budesonide-formoterol, albuterol and nasal fluticasone  Allergic rhinitis due to pollen, unspecified seasonality  - chronic, could be a factor  - continues on loratadine    Discussed above with nursing      Electronically signed by: NADINE Quezada CNP          Sincerely,        NADINE Quezada CNP

## 2023-08-10 NOTE — TELEPHONE ENCOUNTER
Patient Quality Outreach    Patient is due for the following:   Depression  -  PHQ-9 needed    Next Steps:   No follow up needed at this time.  PHQ-9 was completed during office visit today.    Type of outreach:    Chart review performed, no outreach needed.      Questions for provider review:    None     Brittni Cuecna MA  Chart routed to closed.       3

## 2023-09-07 NOTE — TELEPHONE ENCOUNTER
[facility] Geriatrics Triage Nurse Telephone Encounter    Provider: Lary Sue MD  Facility: [facility]  Facility Type:  AL    Caller: Taj  Call Back Number: [phone]    Allergies:    Allergies   Allergen Reactions    Cats Other (See Comments)     Runny nose, watery eyes     Dogs Other (See Comments)     Runny nose and watery eyes     Mold Other (See Comments)     Runny nose, watery eyes         Reason for call: Nurse is reporting that patient is having chronic loose stools.  She's had 2 loose so far today.  She used to have an order for Imodium.      Verbal Order/Direction given by Provider: Loperamide 2mg QID PRN.      Provider giving Order:  Lary Sue MD    Verbal Order given to: Taj Kruger RN

## 2023-09-30 NOTE — PROGRESS NOTES
[facility] GERIATRICS    Chief Complaint   Patient presents with    RECHECK     HPI:  Edmundo James is a 87 year old  (1935), who is being seen today for an episodic care visit at: [facility] ([facility]) [[id number]]. Today's concern is:     Patient is an 87 year old female with  PHI significant for HTN, HLD, OA, depression, asthma, and dementia without official work-up.  She has extensive joint degeneration to her hips and knees and primarily w/c bound now.  Has been seen by [facility] for possible THIAGO.  Was initially planned to have surgery in February 2022 but with significant change in patient condition, surgery was cancelled.  She has struggled with anxiety/depressed mood but had significant hyponatremia with selective serotonin reuptake inhibitor, so this was discontinued and family not interested in trying other medication for mood given her reaction. Her mood has since improved     She is seen today after reports from family and staff of worsening and significant pain to knees.  Per patient's , patient is crying and moaning and in excruciating pain anytime she is transferred.  She is on scheduled APAP, voltaren and icy hot.  She is not a good surgical candidate.  Family is saddened to see her like this and feel a surgery or narcotics is necessary due to the very poor QOL and pain she is in.  Last week I started low dose oxycodone 2.5mg po 30 minutes prior to getting patient OOB with 1 prn dose available    Also reports of loose stools recently, prn imodium ordered by on-call and not effective per staff.  I re-started her psyllium last week     Patient was also treated for possible PNA or asthma exacerbation in August    Patient poor historian due to her advanced dementia, so much of the HPI obtained from .  They feel oxycodone has helped some but not quite enough.  Also request that staff wait to change or move her at all until med has time to take effect; currently staff are  "giving to her and changing her in bed, then coming back 30 minutes to get her OOB.  Thinks bowels moving ok,  not aware of loose stools or constipation.  Breathing at baseline and no increased cough reported    Allergies, and PMH/PSH reviewed in [facility] today.  REVIEW OF SYSTEMS:  Limited secondary to cognitive impairment but as noted in HPI    Objective:   /71   Pulse 89   Temp 97.8  F (36.6  C)   Resp 20   Ht 1.6 m (5' 3\")   Wt 65.3 kg (144 lb)   BMI 25.51 kg/m    GENERAL APPEARANCE:  Alert, in no distress, cooperative, watching tv  RESP:  respiratory effort and palpation of chest normal, lungs clear to auscultation , no respiratory distress, no cough or wheeze during encounter  CV:  Palpation and auscultation of heart done , regular rate and rhythm, no murmur, rub, or gallop, no edema  ABDOMEN:  no guarding or rebound, bowel sounds normal, no masses  M/S:   Gait and station abnormal w/c bound and EZ stand for transfers, impaired ROM to bilateral hips and kness, bilateral kness with flexion contractures and crepitus, kyphosis, right knee contracture worse then left   NEURO:   speech clear no facial asymmetry,  no focal findings   PSYCH:  oriented to person and place, memory impaired , affect and mood normal, very little insight and judgement into her situation, poor historian    Recent labs in [facility] reviewed by me today.     Assessment/Plan:  (M15.9) Primary osteoarthritis involving multiple joints  (primary encounter diagnosis)  (M25.551) Hip pain, right  (M25.561,  G89.29) Chronic pain of right knee  Comment: worsening pain, crying and in significant distress  -x-rays of knees and hips and followed with [facility] in the past, she would benefit from replacements but given her advanced dementia not a good surgical candidate  -family wanting to pursue surgery again or try narcotics given her distress and poor impact on her QOL  -low dose oxycodone started last week and helping some   Plan: APAP, voltaren, " icy hot  -started low dose oxycodone, increase to 5mg po every day, ok to leave prn.  Will ask staff to wait to change or get her up for at least 30 minutes after med given  -discussed with , if they want to pursue surgery, they will need to go back to [facility]. She is poor surgical candidate but if they feel benefit outweighs risk then will need ortho eval first   -monitor bowels as below     (K59.03) Drug-induced constipation  (R19.5) Loose stools  Comment: now with loose stools, started on psyllium last week and now on narcotics  -no issues reported with bowels  Plan: monitor, continue psyllium, prn senna, order reads for staff to give if no BM previous day     (J45.20) Mild intermittent asthma without complication  Comment: recently treated for asthma exacerbation vs PNA in August  -also with allergies  Plan: budesonide-formoterol (SYMBICORT) 160-4.5         MCG/ACT Inhaler        -continue flonase and claritin  -prn aluterol     (G30.1,  F02.80) Late onset Alzheimer's disease without behavioral disturbance (H)  Comment: progressive, impaired language and no longer ambulatory, very poor insight into her situation anxious features at times, resistant to hygiene cares in past but none recently reported  -hydroxyzine was tapered and d'cd in February 2023, but patient with increased itching, hydroxyzine low dose restarted and has helped relieved itching, anxiety also resolved. To note, she did not tolerate selective serotonin reuptake inhibitor's due to hyponatremia  Plan: staff assist with cares and dispense meds  -hydroxyzine low dose 12.5mg BID for anxiety and itching: multiple other modalities tried without success and this has improved her QOL, will restart at low dose.  Staff to update if not effective, previously was on 25mg BID     (I10) Essential hypertension  Comment:   BP Readings from Last 3 Encounters:   10/02/23 122/71   08/09/23 (!) 150/80   07/05/23 139/88    -given age and goals of care, goal SBP  < 150  Plan: continue valsartan          MED REC REQUIRED{Post Medication Reconciliation Status:  Patient was not discharged from an inpatient facility or TCU      Electronically signed by: NADINE Rogers CNP

## 2023-10-02 NOTE — LETTER
10/2/2023        RE: Edmundo James  [address] [place] [address]        [facility] GERIATRICS    Chief Complaint   Patient presents with     RECHECK     HPI:  Edmundo James is a 87 year old  (1935), who is being seen today for an episodic care visit at: [facility][note 2] ([facility]) [[id number]]. Today's concern is:     Patient is an 87 year old female with  PHI significant for HTN, HLD, OA, depression, asthma, and dementia without official work-up.  She has extensive joint degeneration to her hips and knees and primarily w/c bound now.  Has been seen by [facility] for possible THIAGO.  Was initially planned to have surgery in February 2022 but with significant change in patient condition, surgery was cancelled.  She has struggled with anxiety/depressed mood but had significant hyponatremia with selective serotonin reuptake inhibitor, so this was discontinued and family not interested in trying other medication for mood given her reaction. Her mood has since improved     She is seen today after reports from family and staff of worsening and significant pain to knees.  Per patient's , patient is crying and moaning and in excruciating pain anytime she is transferred.  She is on scheduled APAP, voltaren and icy hot.  She is not a good surgical candidate.  Family is saddened to see her like this and feel a surgery or narcotics is necessary due to the very poor QOL and pain she is in.  Last week I started low dose oxycodone 2.5mg po 30 minutes prior to getting patient OOB with 1 prn dose available    Also reports of loose stools recently, prn imodium ordered by on-call and not effective per staff.  I re-started her psyllium last week     Patient was also treated for possible PNA or asthma exacerbation in August    Patient poor historian due to her advanced dementia, so much of the HPI obtained from .  They feel oxycodone has helped some but not quite enough.  Also request that  "staff wait to change or move her at all until med has time to take effect; currently staff are giving to her and changing her in bed, then coming back 30 minutes to get her OOB.  Thinks bowels moving ok,  not aware of loose stools or constipation.  Breathing at baseline and no increased cough reported    Allergies, and PMH/PSH reviewed in EPIC today.  REVIEW OF SYSTEMS:  Limited secondary to cognitive impairment but as noted in HPI    Objective:   /71   Pulse 89   Temp 97.8  F (36.6  C)   Resp 20   Ht 1.6 m (5' 3\")   Wt 65.3 kg (144 lb)   BMI 25.51 kg/m    GENERAL APPEARANCE:  Alert, in no distress, cooperative, watching tv  RESP:  respiratory effort and palpation of chest normal, lungs clear to auscultation , no respiratory distress, no cough or wheeze during encounter  CV:  Palpation and auscultation of heart done , regular rate and rhythm, no murmur, rub, or gallop, no edema  ABDOMEN:  no guarding or rebound, bowel sounds normal, no masses  M/S:   Gait and station abnormal w/c bound and EZ stand for transfers, impaired ROM to bilateral hips and kness, bilateral kness with flexion contractures and crepitus, kyphosis, right knee contracture worse then left   NEURO:   speech clear no facial asymmetry,  no focal findings   PSYCH:  oriented to person and place, memory impaired , affect and mood normal, very little insight and judgement into her situation, poor historian    Recent labs in [facility] reviewed by me today.     Assessment/Plan:  (M15.9) Primary osteoarthritis involving multiple joints  (primary encounter diagnosis)  (M25.551) Hip pain, right  (M25.561,  G89.29) Chronic pain of right knee  Comment: worsening pain, crying and in significant distress  -x-rays of knees and hips and followed with [facility] in the past, she would benefit from replacements but given her advanced dementia not a good surgical candidate  -family wanting to pursue surgery again or try narcotics given her distress and poor " impact on her QOL  -low dose oxycodone started last week and helping some   Plan: APAP, voltaren, icy hot  -started low dose oxycodone, increase to 5mg po every day, ok to leave prn.  Will ask staff to wait to change or get her up for at least 30 minutes after med given  -discussed with , if they want to pursue surgery, they will need to go back to [facility]. She is poor surgical candidate but if they feel benefit outweighs risk then will need ortho eval first   -monitor bowels as below     (K59.03) Drug-induced constipation  (R19.5) Loose stools  Comment: now with loose stools, started on psyllium last week and now on narcotics  -no issues reported with bowels  Plan: monitor, continue psyllium, prn senna, order reads for staff to give if no BM previous day     (J45.20) Mild intermittent asthma without complication  Comment: recently treated for asthma exacerbation vs PNA in August  -also with allergies  Plan: budesonide-formoterol (SYMBICORT) 160-4.5         MCG/ACT Inhaler        -continue flonase and claritin  -prn aluterol     (G30.1,  F02.80) Late onset Alzheimer's disease without behavioral disturbance (H)  Comment: progressive, impaired language and no longer ambulatory, very poor insight into her situation anxious features at times, resistant to hygiene cares in past but none recently reported  -hydroxyzine was tapered and d'cd in February 2023, but patient with increased itching, hydroxyzine low dose restarted and has helped relieved itching, anxiety also resolved. To note, she did not tolerate selective serotonin reuptake inhibitor's due to hyponatremia  Plan: staff assist with cares and dispense meds  -hydroxyzine low dose 12.5mg BID for anxiety and itching: multiple other modalities tried without success and this has improved her QOL, will restart at low dose.  Staff to update if not effective, previously was on 25mg BID     (I10) Essential hypertension  Comment:   BP Readings from Last 3 Encounters:    10/02/23 122/71   08/09/23 (!) 150/80   07/05/23 139/88    -given age and goals of care, goal SBP < 150  Plan: continue valsartan          MED REC REQUIRED{Post Medication Reconciliation Status:  Patient was not discharged from an inpatient facility or TCU      Electronically signed by: NADINE Rogers CNP          Sincerely,        NADINE Rogers CNP

## 2023-10-26 NOTE — LETTER
"    10/26/2023        RE: Edmundo James  [address] [place] [address]        [facility] GERIATRICS    Chief Complaint   Patient presents with     Nursing Home Acute     HPI:  Edmundo James is a 87 year old  (1935), who is being seen today for an episodic care visit at: [facility] ([facility]) [[id number]].    Patient seen today in assisted living apartment [address]  Kenny's reported concern regarding some BLACKMON. CXR this week negative for acute pathology    Patient with h/o asthma.     Today is found sitting up in chair in apartment with  Kenny. Patient herself denies SOB currently and reports occasional nonproductive cough. Denies sore throat, fever, chills or congestion.  Kenny reports patient is always somewhat SOB and seems to have been worse the last 2-3 weeks. Reports patient gets quite SOB with activity like \"even getting ready for bed.\" Patient reports does receive the Symbicort as ordered and has no problems using the inhaler. Both deny patient using the rescue inhaler and per Kenny the inhaler she currently has is quite old. Discussed could be an exacerbation 2/2 seasonal allergies. Patient denies noting allergies this time of year historically but isn't sure. Reports does also take nasal fluticasone as ordered.     Allergies, and PMH/PSH reviewed in [facility] today.  REVIEW OF SYSTEMS:  Limited secondary to cognitive impairment but today pt reports as above and  is collateral resource    Objective:   /71   Pulse 89   Temp 97.9  F (36.6  C)   Resp 20   Ht 1.6 m (5' 3\")   Wt 65.3 kg (144 lb)   BMI 25.51 kg/m      Resp: Effort WNL, LSCTA - no wheezing noted currently. Does have raspy voice - but this is chronic/baseline  CV: VS as above, no edema noted  Abd- soft, nontender, BS +  Musc- ANDREA- seated in chair. Station WNL  Psych- alert, calm, pleasant      Most Recent 3 CBC's:  Recent Labs   Lab Test 10/10/23  1123 05/16/23  0800 11/18/22  0811 "   WBC 3.7* 3.6* 4.1   HGB 13.5 13.4 13.5   MCV 87 90 85    257 240     Most Recent 3 BMP's:  Recent Labs   Lab Test 10/10/23  1123 05/16/23  0845 11/18/22  0811    136 140   POTASSIUM 3.9 3.7 4.1   CHLORIDE 98 99 102   CO2 24 27 26   BUN 11.8 12.2 13.1   CR 0.51 0.50* 0.51   ANIONGAP 13 10 12   RANDALL 9.2 9.3 9.1   GLC 76 66* 90       Assessment/Plan:  Moderate persistent asthma, unspecified whether complicated  -chronic, VSS, no active wheezing. Some BLACKMON. Discussed PT to improve endurance but patient declined this. Discussed trial using albuterol rescue inhaler with spacer and noting if it makes a difference. Not a great candidate for singulaire but ? Consider adding LAMA to improve overall asthma control  - order new albuterol inhaler with spacer for use q 4 hours prn for BLACKMON, SOB, cough, wheezing and try to note if helpful.  - continue Symbicort as ordered.   - continue nasal fluticasone as ordered.   - consider adding LAMA if current symptoms persist.   Declines PT at this time.       Orders:  Written on unit and discussed with patient,  and nursing    Electronically signed by: NADINE Quezada CNP         Sincerely,        NADINE Quezada CNP

## 2023-10-26 NOTE — PROGRESS NOTES
"[facility] GERIATRICS    Chief Complaint   Patient presents with    Nursing Home Acute     HPI:  Edmundo James is a 87 year old  (1935), who is being seen today for an episodic care visit at: [facility] [[id number]].    Patient seen today in assisted living apartment 2[address]2  Kenny's reported concern regarding some BLACKMON. CXR this week negative for acute pathology    Patient with h/o asthma.     Today is found sitting up in chair in apartment with  Kenny. Patient herself denies SOB currently and reports occasional nonproductive cough. Denies sore throat, fever, chills or congestion.  Kenny reports patient is always somewhat SOB and seems to have been worse the last 2-3 weeks. Reports patient gets quite SOB with activity like \"even getting ready for bed.\" Patient reports does receive the Symbicort as ordered and has no problems using the inhaler. Both deny patient using the rescue inhaler and per Kenny the inhaler she currently has is quite old. Discussed could be an exacerbation 2/2 seasonal allergies. Patient denies noting allergies this time of year historically but isn't sure. Reports does also take nasal fluticasone as ordered.     Allergies, and PMH/PSH reviewed in [facility] today.  REVIEW OF SYSTEMS:  Limited secondary to cognitive impairment but today pt reports as above and  is collateral resource    Objective:   /71   Pulse 89   Temp 97.9  F (36.6  C)   Resp 20   Ht 1.6 m (5' 3\")   Wt 65.3 kg (144 lb)   BMI 25.51 kg/m      Resp: Effort WNL, LSCTA - no wheezing noted currently. Does have raspy voice - but this is chronic/baseline  CV: VS as above, no edema noted  Abd- soft, nontender, BS +  Musc- ANDREA- seated in chair. Station WNL  Psych- alert, calm, pleasant      Most Recent 3 CBC's:  Recent Labs   Lab Test 10/10/23  1123 05/16/23  0800 11/18/22  0811   WBC 3.7* 3.6* 4.1   HGB 13.5 13.4 13.5   MCV 87 90 85    257 240     Most Recent 3 " BMP's:  Recent Labs   Lab Test 10/10/23  1123 05/16/23  0845 11/18/22  0811    136 140   POTASSIUM 3.9 3.7 4.1   CHLORIDE 98 99 102   CO2 24 27 26   BUN 11.8 12.2 13.1   CR 0.51 0.50* 0.51   ANIONGAP 13 10 12   RANDALL 9.2 9.3 9.1   GLC 76 66* 90       Assessment/Plan:  Moderate persistent asthma, unspecified whether complicated  -chronic, VSS, no active wheezing. Some BLACKMON. Discussed PT to improve endurance but patient declined this. Discussed trial using albuterol rescue inhaler with spacer and noting if it makes a difference. Not a great candidate for singulaire but ? Consider adding LAMA to improve overall asthma control  - order new albuterol inhaler with spacer for use q 4 hours prn for BLACKMON, SOB, cough, wheezing and try to note if helpful.  - continue Symbicort as ordered.   - continue nasal fluticasone as ordered.   - consider adding LAMA if current symptoms persist.   Declines PT at this time.       Orders:  Written on unit and discussed with patient,  and nursing    Electronically signed by: NADINE Quezada CNP

## 2023-11-21 NOTE — PROGRESS NOTES
[facility] GERIATRICS    Chief Complaint   Patient presents with    RECHECK     HPI:  Edmundo James is a 87 year old  (1935), who is being seen today for an episodic care visit at: [facility] ([facility]) [[id number]]. Today's concern is:     Patient is an 87 year old female with  PHI significant for HTN, HLD, OA, depression, asthma, and dementia without official work-up.  She has extensive joint degeneration to her hips and knees and primarily w/c bound now.  Has been seen by [facility] for possible THIAGO.  Was initially planned to have surgery in February 2022 but with significant change in patient condition, surgery was cancelled.  She has struggled with anxiety/depressed mood but had significant hyponatremia with selective serotonin reuptake inhibitor, so this was discontinued and family not interested in trying other medication for mood given her reaction. Her mood has since improved     She is seen today per staff and family request due to worsening pain in the morning when getting patient up out of bed.  I saw her for this beginning of October and we elected to start low dose narcotic after discussion on risk vs benefit with patient/family due to ongoing pain including crying, moaning when she was transferred.  She is already on scheduled APAP, voltaren and icy hot, therapy no longer can work with her.  She is not a surgical candidate.  Staff are giving pain med at least 30 minutes prior to transfer, initially dose worked well but in past few weeks it has worsened.  No changes in how staff are transferring her, when med is being given or the staff members that are working with her.  We discussed today that will not want to continue to escalate narcotic dose ongoing, and they are aware of that.  Would like to try a small increase.  Patient's  also notes that she has been having more pain in the afternoon as well and wonders if she can take something then.  She gets OOB to her w/c early  "in the morning and does not take a break or move until she gets into bed.  She tells ms she feels better when she can lie down for a little.  In agreement to get back into bed after lunch for a while and see if that helps with pain.  Denies constipation or issues with bowels with narcotic use.  Continues on hydroxyzine for intractable itching and this continues to be effective per patient/'s report.    Allergies, and PMH/PSH reviewed in [facility] today.  REVIEW OF SYSTEMS:  Limited secondary to cognitive impairment but as noted above    Objective:   BP 98/74   Pulse 78   Temp 97.2  F (36.2  C)   Resp 18   Ht 1.6 m (5' 3\")   Wt 65.3 kg (144 lb)   BMI 25.51 kg/m    GENERAL APPEARANCE:  Alert, in no distress, cooperative, watching tv  RESP:  respiratory effort and palpation of chest normal, lungs clear to auscultation , no respiratory distress, no cough or wheeze during encounter  CV:  Palpation and auscultation of heart done , regular rate and rhythm, no murmur, rub, or gallop, no edema  ABDOMEN:  no guarding or rebound, bowel sounds normal, no masses  M/S:   Gait and station abnormal w/c bound and EZ stand for transfers, impaired ROM to bilateral hips and kness, bilateral kness with flexion contractures and crepitus, kyphosis, right knee contracture worse then left   PSYCH:  oriented to person and place, memory impaired , affect and mood normal, very little insight and judgement into her situation, poor historian    Recent labs in [facility] reviewed by me today.     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 To note, she did not tolerate selective serotonin reuptake inhibitor's due to hyponatremia  Plan: staff assist with cares and dispense meds  -hydroxyzine low dose 12.5mg BID for anxiety and itching: multiple other modalities tried without success and this has improved her QOL, will restart at low dose.  Staff to update if not effective, previously was on 25mg BID     (M15.9) Primary osteoarthritis involving multiple joints  (M25.551) Hip pain, right  (M25.561,  G89.29) Chronic pain of right knee  Comment: worsening pain, particularly in the morning with transfers  -was started on low dose narcotic beginning of October after risk/benefits discussed with family.  Bone on bone, would benefit from surgery, but not a surgical candidate  Plan: APAP, voltaren, icy hot  -increase oxycodone to 7.5mg in A.M  -staff to get patient into bed after lunch, see if this helps with pain later in the day  -monitor bowels, no issues currently       MED REC REQUIRED  Post Medication Reconciliation Status:  Patient was not discharged from an inpatient facility or TCU    Electronically signed by: NADINE Rogers CNP

## 2023-11-21 NOTE — LETTER
11/21/2023        RE: Edmundo James  [address] [place] [address]        [facility] GERIATRICS    Chief Complaint   Patient presents with     RECHECK     HPI:  Edmundo James is a 87 year old  (1935), who is being seen today for an episodic care visit at: [facility] ([facility] [[id number]]. Today's concern is:     Patient is an 87 year old female with  PHI significant for HTN, HLD, OA, depression, asthma, and dementia without official work-up.  She has extensive joint degeneration to her hips and knees and primarily w/c bound now.  Has been seen by [facility] for possible THIAGO.  Was initially planned to have surgery in February 2022 but with significant change in patient condition, surgery was cancelled.  She has struggled with anxiety/depressed mood but had significant hyponatremia with selective serotonin reuptake inhibitor, so this was discontinued and family not interested in trying other medication for mood given her reaction. Her mood has since improved     She is seen today per staff and family request due to worsening pain in the morning when getting patient up out of bed.  I saw her for this beginning of October and we elected to start low dose narcotic after discussion on risk vs benefit with patient/family due to ongoing pain including crying, moaning when she was transferred.  She is already on scheduled APAP, voltaren and icy hot, therapy no longer can work with her.  She is not a surgical candidate.  Staff are giving pain med at least 30 minutes prior to transfer, initially dose worked well but in past few weeks it has worsened.  No changes in how staff are transferring her, when med is being given or the staff members that are working with her.  We discussed today that will not want to continue to escalate narcotic dose ongoing, and they are aware of that.  Would like to try a small increase.  Patient's  also notes that she has been having more pain in the  "afternoon as well and wonders if she can take something then.  She gets OOB to her w/c early in the morning and does not take a break or move until she gets into bed.  She tells ms she feels better when she can lie down for a little.  In agreement to get back into bed after lunch for a while and see if that helps with pain.  Denies constipation or issues with bowels with narcotic use.  Continues on hydroxyzine for intractable itching and this continues to be effective per patient/'s report.    Allergies, and PMH/PSH reviewed in [facility] today.  REVIEW OF SYSTEMS:  Limited secondary to cognitive impairment but as noted above    Objective:   BP 98/74   Pulse 78   Temp 97.2  F (36.2  C)   Resp 18   Ht 1.6 m (5' 3\")   Wt 65.3 kg (144 lb)   BMI 25.51 kg/m    GENERAL APPEARANCE:  Alert, in no distress, cooperative, watching tv  RESP:  respiratory effort and palpation of chest normal, lungs clear to auscultation , no respiratory distress, no cough or wheeze during encounter  CV:  Palpation and auscultation of heart done , regular rate and rhythm, no murmur, rub, or gallop, no edema  ABDOMEN:  no guarding or rebound, bowel sounds normal, no masses  M/S:   Gait and station abnormal w/c bound and EZ stand for transfers, impaired ROM to bilateral hips and kness, bilateral kness with flexion contractures and crepitus, kyphosis, right knee contracture worse then left   PSYCH:  oriented to person and place, memory impaired , affect and mood normal, very little insight and judgement into her situation, poor historian    Recent labs in [facility] reviewed by me today.     Assessment/Plan:  (G30.1,  F02.80) Late onset Alzheimer's disease without behavioral disturbance (H)   (L28.0) Neurodermatitis (primary encounter diagnosis)  Comment: progressive, impaired language and no longer ambulatory, very poor insight into her situation anxious features at times, resistant to hygiene cares in past but none recently reported  -hydroxyzine " was tapered and d'cd in February 2023, but patient with increased itching, hydroxyzine low dose restarted and has helped relieved itching, anxiety also resolved. To note, she did not tolerate selective serotonin reuptake inhibitor's due to hyponatremia  Plan: staff assist with cares and dispense meds  -hydroxyzine low dose 12.5mg BID for anxiety and itching: multiple other modalities tried without success and this has improved her QOL, will restart at low dose.  Staff to update if not effective, previously was on 25mg BID     (M15.9) Primary osteoarthritis involving multiple joints  (M25.551) Hip pain, right  (M25.561,  G89.29) Chronic pain of right knee  Comment: worsening pain, particularly in the morning with transfers  -was started on low dose narcotic beginning of October after risk/benefits discussed with family.  Bone on bone, would benefit from surgery, but not a surgical candidate  Plan: APAP, voltaren, icy hot  -increase oxycodone to 7.5mg in A.M  -staff to get patient into bed after lunch, see if this helps with pain later in the day  -monitor bowels, no issues currently       MED REC REQUIRED  Post Medication Reconciliation Status:  Patient was not discharged from an inpatient facility or TCU    Electronically signed by: NADINE Rogers CNP         Sincerely,        NADINE Rogers CNP

## 2024-01-01 ENCOUNTER — ASSISTED LIVING VISIT (OUTPATIENT)
Dept: GERIATRICS | Facility: CLINIC | Age: 89
End: 2024-01-01
Payer: COMMERCIAL

## 2024-01-01 ENCOUNTER — HOSPITAL ENCOUNTER (INPATIENT)
Facility: CLINIC | Age: 89
LOS: 4 days | Discharge: NURSING, REHAB OR LONG-TERM CARE | DRG: 202 | End: 2024-01-29
Attending: EMERGENCY MEDICINE | Admitting: HOSPITALIST
Payer: COMMERCIAL

## 2024-01-01 ENCOUNTER — DOCUMENTATION ONLY (OUTPATIENT)
Dept: OTHER | Facility: CLINIC | Age: 89
End: 2024-01-01

## 2024-01-01 ENCOUNTER — LAB REQUISITION (OUTPATIENT)
Dept: LAB | Facility: CLINIC | Age: 89
End: 2024-01-01
Payer: COMMERCIAL

## 2024-01-01 ENCOUNTER — APPOINTMENT (OUTPATIENT)
Dept: GENERAL RADIOLOGY | Facility: CLINIC | Age: 89
DRG: 202 | End: 2024-01-01
Attending: EMERGENCY MEDICINE
Payer: COMMERCIAL

## 2024-01-01 ENCOUNTER — TELEPHONE (OUTPATIENT)
Dept: GERIATRICS | Facility: CLINIC | Age: 89
End: 2024-01-01
Payer: COMMERCIAL

## 2024-01-01 ENCOUNTER — APPOINTMENT (OUTPATIENT)
Dept: GENERAL RADIOLOGY | Facility: CLINIC | Age: 89
DRG: 202 | End: 2024-01-01
Attending: NURSE PRACTITIONER
Payer: COMMERCIAL

## 2024-01-01 ENCOUNTER — TRANSFERRED RECORDS (OUTPATIENT)
Dept: HEALTH INFORMATION MANAGEMENT | Facility: CLINIC | Age: 89
End: 2024-01-01
Payer: COMMERCIAL

## 2024-01-01 ENCOUNTER — ASSISTED LIVING VISIT (OUTPATIENT)
Dept: GERIATRICS | Facility: CLINIC | Age: 89
End: 2024-01-01
Payer: OTHER MISCELLANEOUS

## 2024-01-01 ENCOUNTER — DOCUMENTATION ONLY (OUTPATIENT)
Dept: GERIATRICS | Facility: CLINIC | Age: 89
End: 2024-01-01
Payer: COMMERCIAL

## 2024-01-01 VITALS
DIASTOLIC BLOOD PRESSURE: 74 MMHG | HEIGHT: 63 IN | BODY MASS INDEX: 26.05 KG/M2 | SYSTOLIC BLOOD PRESSURE: 98 MMHG | TEMPERATURE: 97.2 F | WEIGHT: 147 LBS | HEART RATE: 78 BPM | RESPIRATION RATE: 18 BRPM

## 2024-01-01 VITALS
HEIGHT: 63 IN | RESPIRATION RATE: 18 BRPM | TEMPERATURE: 97.4 F | HEART RATE: 97 BPM | SYSTOLIC BLOOD PRESSURE: 128 MMHG | DIASTOLIC BLOOD PRESSURE: 84 MMHG | BODY MASS INDEX: 25.91 KG/M2 | WEIGHT: 146.2 LBS | OXYGEN SATURATION: 90 %

## 2024-01-01 VITALS
WEIGHT: 150 LBS | HEART RATE: 84 BPM | OXYGEN SATURATION: 93 % | BODY MASS INDEX: 26.58 KG/M2 | HEIGHT: 63 IN | RESPIRATION RATE: 18 BRPM | DIASTOLIC BLOOD PRESSURE: 68 MMHG | TEMPERATURE: 97.7 F | SYSTOLIC BLOOD PRESSURE: 98 MMHG

## 2024-01-01 VITALS
BODY MASS INDEX: 32.92 KG/M2 | RESPIRATION RATE: 15 BRPM | SYSTOLIC BLOOD PRESSURE: 112 MMHG | OXYGEN SATURATION: 84 % | TEMPERATURE: 98.1 F | DIASTOLIC BLOOD PRESSURE: 74 MMHG | HEIGHT: 63 IN | HEART RATE: 80 BPM | WEIGHT: 185.8 LBS

## 2024-01-01 VITALS
HEIGHT: 63 IN | RESPIRATION RATE: 18 BRPM | DIASTOLIC BLOOD PRESSURE: 70 MMHG | SYSTOLIC BLOOD PRESSURE: 110 MMHG | WEIGHT: 149 LBS | BODY MASS INDEX: 26.4 KG/M2 | TEMPERATURE: 98.1 F | HEART RATE: 87 BPM | OXYGEN SATURATION: 94 %

## 2024-01-01 VITALS
DIASTOLIC BLOOD PRESSURE: 87 MMHG | HEIGHT: 63 IN | HEART RATE: 61 BPM | SYSTOLIC BLOOD PRESSURE: 138 MMHG | RESPIRATION RATE: 16 BRPM | TEMPERATURE: 97.6 F | BODY MASS INDEX: 26.57 KG/M2 | OXYGEN SATURATION: 94 %

## 2024-01-01 VITALS
SYSTOLIC BLOOD PRESSURE: 98 MMHG | OXYGEN SATURATION: 93 % | TEMPERATURE: 97.7 F | HEART RATE: 84 BPM | WEIGHT: 150 LBS | BODY MASS INDEX: 26.58 KG/M2 | HEIGHT: 63 IN | DIASTOLIC BLOOD PRESSURE: 68 MMHG | RESPIRATION RATE: 18 BRPM

## 2024-01-01 VITALS
DIASTOLIC BLOOD PRESSURE: 74 MMHG | HEIGHT: 63 IN | TEMPERATURE: 98.1 F | BODY MASS INDEX: 32.92 KG/M2 | RESPIRATION RATE: 15 BRPM | HEART RATE: 80 BPM | OXYGEN SATURATION: 84 % | WEIGHT: 185.8 LBS | SYSTOLIC BLOOD PRESSURE: 112 MMHG

## 2024-01-01 VITALS
RESPIRATION RATE: 18 BRPM | HEART RATE: 84 BPM | HEIGHT: 63 IN | TEMPERATURE: 97.7 F | WEIGHT: 150 LBS | OXYGEN SATURATION: 93 % | BODY MASS INDEX: 26.58 KG/M2 | SYSTOLIC BLOOD PRESSURE: 98 MMHG | DIASTOLIC BLOOD PRESSURE: 68 MMHG

## 2024-01-01 VITALS
HEART RATE: 84 BPM | HEIGHT: 63 IN | SYSTOLIC BLOOD PRESSURE: 98 MMHG | OXYGEN SATURATION: 93 % | DIASTOLIC BLOOD PRESSURE: 68 MMHG | WEIGHT: 150 LBS | BODY MASS INDEX: 26.58 KG/M2 | TEMPERATURE: 97.7 F | RESPIRATION RATE: 18 BRPM

## 2024-01-01 VITALS
OXYGEN SATURATION: 94 % | DIASTOLIC BLOOD PRESSURE: 70 MMHG | HEART RATE: 87 BPM | HEIGHT: 63 IN | TEMPERATURE: 98.1 F | RESPIRATION RATE: 18 BRPM | WEIGHT: 149 LBS | BODY MASS INDEX: 26.4 KG/M2 | SYSTOLIC BLOOD PRESSURE: 110 MMHG

## 2024-01-01 DIAGNOSIS — J96.01 ACUTE HYPOXIC RESPIRATORY FAILURE (H): ICD-10-CM

## 2024-01-01 DIAGNOSIS — F41.9 ANXIETY: ICD-10-CM

## 2024-01-01 DIAGNOSIS — R52 PAIN: Primary | ICD-10-CM

## 2024-01-01 DIAGNOSIS — F33.1 MODERATE EPISODE OF RECURRENT MAJOR DEPRESSIVE DISORDER (H): ICD-10-CM

## 2024-01-01 DIAGNOSIS — G89.29 OTHER CHRONIC PAIN: ICD-10-CM

## 2024-01-01 DIAGNOSIS — K59.03 DRUG-INDUCED CONSTIPATION: ICD-10-CM

## 2024-01-01 DIAGNOSIS — J96.01 ACUTE HYPOXEMIC RESPIRATORY FAILURE (H): Primary | ICD-10-CM

## 2024-01-01 DIAGNOSIS — L28.0 NEURODERMATITIS: ICD-10-CM

## 2024-01-01 DIAGNOSIS — F02.80 LATE ONSET ALZHEIMER'S DISEASE WITHOUT BEHAVIORAL DISTURBANCE (H): ICD-10-CM

## 2024-01-01 DIAGNOSIS — B33.8 RSV INFECTION: Primary | ICD-10-CM

## 2024-01-01 DIAGNOSIS — J30.1 ALLERGIC RHINITIS DUE TO POLLEN, UNSPECIFIED SEASONALITY: ICD-10-CM

## 2024-01-01 DIAGNOSIS — D72.819 LEUKOPENIA, UNSPECIFIED TYPE: ICD-10-CM

## 2024-01-01 DIAGNOSIS — B33.8 RSV INFECTION: ICD-10-CM

## 2024-01-01 DIAGNOSIS — J45.40 MODERATE PERSISTENT ASTHMA, UNSPECIFIED WHETHER COMPLICATED: ICD-10-CM

## 2024-01-01 DIAGNOSIS — G30.1 LATE ONSET ALZHEIMER'S DISEASE WITHOUT BEHAVIORAL DISTURBANCE (H): ICD-10-CM

## 2024-01-01 DIAGNOSIS — E87.1 HYPONATREMIA: ICD-10-CM

## 2024-01-01 DIAGNOSIS — I10 PRIMARY HYPERTENSION: ICD-10-CM

## 2024-01-01 DIAGNOSIS — M62.81 GENERALIZED MUSCLE WEAKNESS: ICD-10-CM

## 2024-01-01 DIAGNOSIS — G47.00 INSOMNIA, UNSPECIFIED TYPE: Primary | ICD-10-CM

## 2024-01-01 DIAGNOSIS — J45.41 MODERATE PERSISTENT ASTHMA WITH EXACERBATION: ICD-10-CM

## 2024-01-01 DIAGNOSIS — F41.8 DEPRESSION WITH ANXIETY: ICD-10-CM

## 2024-01-01 DIAGNOSIS — J45.41 MODERATE PERSISTENT ASTHMA WITH ACUTE EXACERBATION: ICD-10-CM

## 2024-01-01 DIAGNOSIS — R41.0 CONFUSION: ICD-10-CM

## 2024-01-01 DIAGNOSIS — R52 PAIN: ICD-10-CM

## 2024-01-01 DIAGNOSIS — M54.50 CHRONIC BILATERAL LOW BACK PAIN WITHOUT SCIATICA: ICD-10-CM

## 2024-01-01 DIAGNOSIS — R19.5 LOOSE STOOLS: Primary | ICD-10-CM

## 2024-01-01 DIAGNOSIS — R41.89 COGNITIVE IMPAIRMENT: ICD-10-CM

## 2024-01-01 DIAGNOSIS — M15.0 PRIMARY OSTEOARTHRITIS INVOLVING MULTIPLE JOINTS: ICD-10-CM

## 2024-01-01 DIAGNOSIS — G89.29 CHRONIC PAIN OF RIGHT KNEE: ICD-10-CM

## 2024-01-01 DIAGNOSIS — M15.0 PRIMARY OSTEOARTHRITIS INVOLVING MULTIPLE JOINTS: Primary | ICD-10-CM

## 2024-01-01 DIAGNOSIS — E87.1 HYPO-OSMOLALITY AND HYPONATREMIA: ICD-10-CM

## 2024-01-01 DIAGNOSIS — R53.1 WEAKNESS: ICD-10-CM

## 2024-01-01 DIAGNOSIS — L29.9 ITCHING: ICD-10-CM

## 2024-01-01 DIAGNOSIS — M25.561 CHRONIC PAIN OF RIGHT KNEE: ICD-10-CM

## 2024-01-01 DIAGNOSIS — G47.00 INSOMNIA, UNSPECIFIED TYPE: ICD-10-CM

## 2024-01-01 DIAGNOSIS — Z53.9 DIAGNOSIS NOT YET DEFINED: Primary | ICD-10-CM

## 2024-01-01 DIAGNOSIS — J45.901 EXACERBATION OF ASTHMA, UNSPECIFIED ASTHMA SEVERITY, UNSPECIFIED WHETHER PERSISTENT: ICD-10-CM

## 2024-01-01 DIAGNOSIS — J21.0 RSV BRONCHIOLITIS: Primary | ICD-10-CM

## 2024-01-01 DIAGNOSIS — Z51.5 HOSPICE CARE PATIENT: ICD-10-CM

## 2024-01-01 DIAGNOSIS — J20.5 ACUTE BRONCHITIS DUE TO RESPIRATORY SYNCYTIAL VIRUS (RSV): ICD-10-CM

## 2024-01-01 DIAGNOSIS — R11.10 DRY HEAVES: Primary | ICD-10-CM

## 2024-01-01 DIAGNOSIS — M25.551 HIP PAIN, RIGHT: ICD-10-CM

## 2024-01-01 DIAGNOSIS — G89.29 CHRONIC BILATERAL LOW BACK PAIN WITHOUT SCIATICA: ICD-10-CM

## 2024-01-01 LAB
ANION GAP SERPL CALCULATED.3IONS-SCNC: 10 MMOL/L (ref 7–15)
ANION GAP SERPL CALCULATED.3IONS-SCNC: 14 MMOL/L (ref 7–15)
ANION GAP SERPL CALCULATED.3IONS-SCNC: 6 MMOL/L (ref 7–15)
ANION GAP SERPL CALCULATED.3IONS-SCNC: 8 MMOL/L (ref 7–15)
ANION GAP SERPL CALCULATED.3IONS-SCNC: 8 MMOL/L (ref 7–15)
ATRIAL RATE - MUSE: 115 BPM
ATRIAL RATE - MUSE: 79 BPM
ATRIAL RATE - MUSE: 81 BPM
ATRIAL RATE - MUSE: 97 BPM
ATRIAL RATE - MUSE: 98 BPM
BASE EXCESS BLDV CALC-SCNC: 0.9 MMOL/L (ref -3–3)
BASE EXCESS BLDV CALC-SCNC: 2.2 MMOL/L (ref -3–3)
BASOPHILS # BLD AUTO: 0 10E3/UL (ref 0–0.2)
BASOPHILS NFR BLD AUTO: 0 %
BUN SERPL-MCNC: 12.7 MG/DL (ref 8–23)
BUN SERPL-MCNC: 13.8 MG/DL (ref 8–23)
BUN SERPL-MCNC: 17.8 MG/DL (ref 8–23)
BUN SERPL-MCNC: 18.5 MG/DL (ref 8–23)
BUN SERPL-MCNC: 19.3 MG/DL (ref 8–23)
BUN SERPL-MCNC: 23.5 MG/DL (ref 8–23)
BUN SERPL-MCNC: 24 MG/DL (ref 8–23)
C PNEUM DNA SPEC QL NAA+PROBE: NOT DETECTED
CALCIUM SERPL-MCNC: 8.8 MG/DL (ref 8.8–10.2)
CALCIUM SERPL-MCNC: 9 MG/DL (ref 8.8–10.2)
CALCIUM SERPL-MCNC: 9 MG/DL (ref 8.8–10.2)
CALCIUM SERPL-MCNC: 9.1 MG/DL (ref 8.8–10.2)
CALCIUM SERPL-MCNC: 9.2 MG/DL (ref 8.8–10.2)
CALCIUM SERPL-MCNC: 9.3 MG/DL (ref 8.8–10.2)
CALCIUM SERPL-MCNC: 9.4 MG/DL (ref 8.8–10.2)
CHLORIDE SERPL-SCNC: 100 MMOL/L (ref 98–107)
CHLORIDE SERPL-SCNC: 101 MMOL/L (ref 98–107)
CHLORIDE SERPL-SCNC: 101 MMOL/L (ref 98–107)
CHLORIDE SERPL-SCNC: 97 MMOL/L (ref 98–107)
CHLORIDE SERPL-SCNC: 98 MMOL/L (ref 98–107)
CHLORIDE SERPL-SCNC: 98 MMOL/L (ref 98–107)
CHLORIDE SERPL-SCNC: 99 MMOL/L (ref 98–107)
CREAT SERPL-MCNC: 0.51 MG/DL (ref 0.51–0.95)
CREAT SERPL-MCNC: 0.52 MG/DL (ref 0.51–0.95)
CREAT SERPL-MCNC: 0.54 MG/DL (ref 0.51–0.95)
CREAT SERPL-MCNC: 0.56 MG/DL (ref 0.51–0.95)
CREAT SERPL-MCNC: 0.58 MG/DL (ref 0.51–0.95)
CREAT SERPL-MCNC: 0.58 MG/DL (ref 0.51–0.95)
D DIMER PPP FEU-MCNC: 0.5 UG/ML FEU (ref 0–0.5)
DEPRECATED HCO3 PLAS-SCNC: 23 MMOL/L (ref 22–29)
DEPRECATED HCO3 PLAS-SCNC: 24 MMOL/L (ref 22–29)
DEPRECATED HCO3 PLAS-SCNC: 25 MMOL/L (ref 22–29)
DEPRECATED HCO3 PLAS-SCNC: 26 MMOL/L (ref 22–29)
DEPRECATED HCO3 PLAS-SCNC: 28 MMOL/L (ref 22–29)
DEPRECATED HCO3 PLAS-SCNC: 28 MMOL/L (ref 22–29)
DEPRECATED HCO3 PLAS-SCNC: 30 MMOL/L (ref 22–29)
DIASTOLIC BLOOD PRESSURE - MUSE: NORMAL MMHG
EGFRCR SERPLBLD CKD-EPI 2021: 88 ML/MIN/1.73M2
EGFRCR SERPLBLD CKD-EPI 2021: 89 ML/MIN/1.73M2
EGFRCR SERPLBLD CKD-EPI 2021: 90 ML/MIN/1.73M2
EGFRCR SERPLBLD CKD-EPI 2021: 90 ML/MIN/1.73M2
EOSINOPHIL # BLD AUTO: 0 10E3/UL (ref 0–0.7)
EOSINOPHIL # BLD AUTO: 0 10E3/UL (ref 0–0.7)
EOSINOPHIL # BLD AUTO: 0.1 10E3/UL (ref 0–0.7)
EOSINOPHIL NFR BLD AUTO: 0 %
EOSINOPHIL NFR BLD AUTO: 0 %
EOSINOPHIL NFR BLD AUTO: 1 %
ERYTHROCYTE [DISTWIDTH] IN BLOOD BY AUTOMATED COUNT: 13.8 % (ref 10–15)
ERYTHROCYTE [DISTWIDTH] IN BLOOD BY AUTOMATED COUNT: 14 % (ref 10–15)
FLUAV H1 2009 PAND RNA SPEC QL NAA+PROBE: NOT DETECTED
FLUAV H1 RNA SPEC QL NAA+PROBE: NOT DETECTED
FLUAV H3 RNA SPEC QL NAA+PROBE: NOT DETECTED
FLUAV RNA SPEC QL NAA+PROBE: NEGATIVE
FLUAV RNA SPEC QL NAA+PROBE: NOT DETECTED
FLUBV RNA RESP QL NAA+PROBE: NEGATIVE
FLUBV RNA SPEC QL NAA+PROBE: NOT DETECTED
GLUCOSE SERPL-MCNC: 104 MG/DL (ref 70–99)
GLUCOSE SERPL-MCNC: 132 MG/DL (ref 70–99)
GLUCOSE SERPL-MCNC: 148 MG/DL (ref 70–99)
GLUCOSE SERPL-MCNC: 82 MG/DL (ref 70–99)
GLUCOSE SERPL-MCNC: 84 MG/DL (ref 70–99)
GLUCOSE SERPL-MCNC: 86 MG/DL (ref 70–99)
GLUCOSE SERPL-MCNC: 88 MG/DL (ref 70–99)
HADV DNA SPEC QL NAA+PROBE: NOT DETECTED
HCO3 BLDV-SCNC: 25 MMOL/L (ref 21–28)
HCO3 BLDV-SCNC: 25 MMOL/L (ref 21–28)
HCOV PNL SPEC NAA+PROBE: NOT DETECTED
HCT VFR BLD AUTO: 38.2 % (ref 35–47)
HCT VFR BLD AUTO: 40.8 % (ref 35–47)
HCT VFR BLD AUTO: 41.2 % (ref 35–47)
HCT VFR BLD AUTO: 42.1 % (ref 35–47)
HCT VFR BLD AUTO: 42.6 % (ref 35–47)
HGB BLD-MCNC: 12.7 G/DL (ref 11.7–15.7)
HGB BLD-MCNC: 13.5 G/DL (ref 11.7–15.7)
HGB BLD-MCNC: 13.6 G/DL (ref 11.7–15.7)
HGB BLD-MCNC: 13.8 G/DL (ref 11.7–15.7)
HGB BLD-MCNC: 13.9 G/DL (ref 11.7–15.7)
HMPV RNA SPEC QL NAA+PROBE: NOT DETECTED
HOLD SPECIMEN: NORMAL
HOLD SPECIMEN: NORMAL
HPIV1 RNA SPEC QL NAA+PROBE: NOT DETECTED
HPIV2 RNA SPEC QL NAA+PROBE: NOT DETECTED
HPIV3 RNA SPEC QL NAA+PROBE: NOT DETECTED
HPIV4 RNA SPEC QL NAA+PROBE: NOT DETECTED
IMM GRANULOCYTES # BLD: 0 10E3/UL
IMM GRANULOCYTES # BLD: 0 10E3/UL
IMM GRANULOCYTES # BLD: 0.1 10E3/UL
IMM GRANULOCYTES NFR BLD: 0 %
IMM GRANULOCYTES NFR BLD: 1 %
IMM GRANULOCYTES NFR BLD: 1 %
INTERPRETATION ECG - MUSE: NORMAL
LACTATE SERPL-SCNC: 0.8 MMOL/L (ref 0.7–2)
LYMPHOCYTES # BLD AUTO: 0.4 10E3/UL (ref 0.8–5.3)
LYMPHOCYTES # BLD AUTO: 0.7 10E3/UL (ref 0.8–5.3)
LYMPHOCYTES # BLD AUTO: 1.4 10E3/UL (ref 0.8–5.3)
LYMPHOCYTES NFR BLD AUTO: 11 %
LYMPHOCYTES NFR BLD AUTO: 13 %
LYMPHOCYTES NFR BLD AUTO: 16 %
M PNEUMO DNA SPEC QL NAA+PROBE: NOT DETECTED
MAGNESIUM SERPL-MCNC: 1.8 MG/DL (ref 1.7–2.3)
MAGNESIUM SERPL-MCNC: 2 MG/DL (ref 1.7–2.3)
MAGNESIUM SERPL-MCNC: 2.1 MG/DL (ref 1.7–2.3)
MCH RBC QN AUTO: 27.9 PG (ref 26.5–33)
MCH RBC QN AUTO: 28 PG (ref 26.5–33)
MCH RBC QN AUTO: 28.6 PG (ref 26.5–33)
MCHC RBC AUTO-ENTMCNC: 32.6 G/DL (ref 31.5–36.5)
MCHC RBC AUTO-ENTMCNC: 32.8 G/DL (ref 31.5–36.5)
MCHC RBC AUTO-ENTMCNC: 33 G/DL (ref 31.5–36.5)
MCHC RBC AUTO-ENTMCNC: 33.1 G/DL (ref 31.5–36.5)
MCHC RBC AUTO-ENTMCNC: 33.2 G/DL (ref 31.5–36.5)
MCV RBC AUTO: 85 FL (ref 78–100)
MCV RBC AUTO: 85 FL (ref 78–100)
MCV RBC AUTO: 86 FL (ref 78–100)
MONOCYTES # BLD AUTO: 0.2 10E3/UL (ref 0–1.3)
MONOCYTES # BLD AUTO: 0.2 10E3/UL (ref 0–1.3)
MONOCYTES # BLD AUTO: 0.6 10E3/UL (ref 0–1.3)
MONOCYTES NFR BLD AUTO: 3 %
MONOCYTES NFR BLD AUTO: 4 %
MONOCYTES NFR BLD AUTO: 7 %
NEUTROPHILS # BLD AUTO: 3.3 10E3/UL (ref 1.6–8.3)
NEUTROPHILS # BLD AUTO: 4.3 10E3/UL (ref 1.6–8.3)
NEUTROPHILS # BLD AUTO: 6.6 10E3/UL (ref 1.6–8.3)
NEUTROPHILS NFR BLD AUTO: 75 %
NEUTROPHILS NFR BLD AUTO: 84 %
NEUTROPHILS NFR BLD AUTO: 84 %
NRBC # BLD AUTO: 0 10E3/UL
NRBC BLD AUTO-RTO: 0 /100
NT-PROBNP SERPL-MCNC: 2648 PG/ML (ref 0–1800)
O2/TOTAL GAS SETTING VFR VENT: 2 %
O2/TOTAL GAS SETTING VFR VENT: 30 %
OXYHGB MFR BLDV: 94 % (ref 70–75)
OXYHGB MFR BLDV: 98 % (ref 70–75)
P AXIS - MUSE: -9 DEGREES
P AXIS - MUSE: 26 DEGREES
P AXIS - MUSE: 28 DEGREES
P AXIS - MUSE: 31 DEGREES
P AXIS - MUSE: 44 DEGREES
PCO2 BLDV: 34 MM HG (ref 40–50)
PCO2 BLDV: 38 MM HG (ref 40–50)
PH BLDV: 7.43 [PH] (ref 7.32–7.43)
PH BLDV: 7.48 [PH] (ref 7.32–7.43)
PHOSPHATE SERPL-MCNC: 3.9 MG/DL (ref 2.5–4.5)
PLATELET # BLD AUTO: 245 10E3/UL (ref 150–450)
PLATELET # BLD AUTO: 283 10E3/UL (ref 150–450)
PLATELET # BLD AUTO: 302 10E3/UL (ref 150–450)
PLATELET # BLD AUTO: 306 10E3/UL (ref 150–450)
PLATELET # BLD AUTO: 317 10E3/UL (ref 150–450)
PO2 BLDV: 116 MM HG (ref 25–47)
PO2 BLDV: 69 MM HG (ref 25–47)
POTASSIUM SERPL-SCNC: 3.1 MMOL/L (ref 3.4–5.3)
POTASSIUM SERPL-SCNC: 3.1 MMOL/L (ref 3.4–5.3)
POTASSIUM SERPL-SCNC: 3.5 MMOL/L (ref 3.4–5.3)
POTASSIUM SERPL-SCNC: 3.6 MMOL/L (ref 3.4–5.3)
POTASSIUM SERPL-SCNC: 3.7 MMOL/L (ref 3.4–5.3)
POTASSIUM SERPL-SCNC: 3.8 MMOL/L (ref 3.4–5.3)
POTASSIUM SERPL-SCNC: 3.8 MMOL/L (ref 3.4–5.3)
POTASSIUM SERPL-SCNC: 3.9 MMOL/L (ref 3.4–5.3)
POTASSIUM SERPL-SCNC: 4 MMOL/L (ref 3.4–5.3)
POTASSIUM SERPL-SCNC: 4 MMOL/L (ref 3.4–5.3)
POTASSIUM SERPL-SCNC: 4.1 MMOL/L (ref 3.4–5.3)
POTASSIUM SERPL-SCNC: 4.2 MMOL/L (ref 3.4–5.3)
PR INTERVAL - MUSE: 138 MS
PR INTERVAL - MUSE: 170 MS
PR INTERVAL - MUSE: 176 MS
PR INTERVAL - MUSE: 176 MS
PR INTERVAL - MUSE: 178 MS
PROCALCITONIN SERPL IA-MCNC: 0.06 NG/ML
QRS DURATION - MUSE: 104 MS
QRS DURATION - MUSE: 106 MS
QRS DURATION - MUSE: 112 MS
QRS DURATION - MUSE: 126 MS
QRS DURATION - MUSE: 128 MS
QT - MUSE: 346 MS
QT - MUSE: 358 MS
QT - MUSE: 408 MS
QT - MUSE: 436 MS
QT - MUSE: 474 MS
QTC - MUSE: 454 MS
QTC - MUSE: 473 MS
QTC - MUSE: 478 MS
QTC - MUSE: 543 MS
QTC - MUSE: 556 MS
R AXIS - MUSE: -40 DEGREES
R AXIS - MUSE: -46 DEGREES
R AXIS - MUSE: -49 DEGREES
R AXIS - MUSE: -50 DEGREES
R AXIS - MUSE: -53 DEGREES
RBC # BLD AUTO: 4.44 10E6/UL (ref 3.8–5.2)
RBC # BLD AUTO: 4.83 10E6/UL (ref 3.8–5.2)
RBC # BLD AUTO: 4.86 10E6/UL (ref 3.8–5.2)
RBC # BLD AUTO: 4.92 10E6/UL (ref 3.8–5.2)
RBC # BLD AUTO: 4.98 10E6/UL (ref 3.8–5.2)
RSV RNA SPEC NAA+PROBE: POSITIVE
RSV RNA SPEC QL NAA+PROBE: DETECTED
RSV RNA SPEC QL NAA+PROBE: NOT DETECTED
RV+EV RNA SPEC QL NAA+PROBE: NOT DETECTED
SAO2 % BLDV: 95.3 % (ref 70–75)
SAO2 % BLDV: 98.5 % (ref 70–75)
SARS-COV-2 RNA RESP QL NAA+PROBE: NEGATIVE
SODIUM SERPL-SCNC: 133 MMOL/L (ref 135–145)
SODIUM SERPL-SCNC: 134 MMOL/L (ref 135–145)
SODIUM SERPL-SCNC: 135 MMOL/L (ref 135–145)
SODIUM SERPL-SCNC: 137 MMOL/L (ref 135–145)
SODIUM SERPL-SCNC: 137 MMOL/L (ref 135–145)
SYSTOLIC BLOOD PRESSURE - MUSE: NORMAL MMHG
T AXIS - MUSE: 107 DEGREES
T AXIS - MUSE: 154 DEGREES
T AXIS - MUSE: 213 DEGREES
T AXIS - MUSE: 238 DEGREES
T AXIS - MUSE: 90 DEGREES
TROPONIN T SERPL HS-MCNC: 15 NG/L
TROPONIN T SERPL HS-MCNC: 57 NG/L
TROPONIN T SERPL HS-MCNC: 61 NG/L
TROPONIN T SERPL HS-MCNC: 70 NG/L
TROPONIN T SERPL HS-MCNC: 71 NG/L
VENTRICULAR RATE- MUSE: 115 BPM
VENTRICULAR RATE- MUSE: 79 BPM
VENTRICULAR RATE- MUSE: 81 BPM
VENTRICULAR RATE- MUSE: 97 BPM
VENTRICULAR RATE- MUSE: 98 BPM
WBC # BLD AUTO: 3.9 10E3/UL (ref 4–11)
WBC # BLD AUTO: 5.1 10E3/UL (ref 4–11)
WBC # BLD AUTO: 8.7 10E3/UL (ref 4–11)
WBC # BLD AUTO: 9 10E3/UL (ref 4–11)
WBC # BLD AUTO: 9.2 10E3/UL (ref 4–11)

## 2024-01-01 PROCEDURE — 250N000011 HC RX IP 250 OP 636: Mod: JZ | Performed by: NURSE PRACTITIONER

## 2024-01-01 PROCEDURE — 36415 COLL VENOUS BLD VENIPUNCTURE: CPT | Performed by: NURSE PRACTITIONER

## 2024-01-01 PROCEDURE — 84484 ASSAY OF TROPONIN QUANT: CPT | Performed by: EMERGENCY MEDICINE

## 2024-01-01 PROCEDURE — 85027 COMPLETE CBC AUTOMATED: CPT | Performed by: NURSE PRACTITIONER

## 2024-01-01 PROCEDURE — 99232 SBSQ HOSP IP/OBS MODERATE 35: CPT | Performed by: NURSE PRACTITIONER

## 2024-01-01 PROCEDURE — 99232 SBSQ HOSP IP/OBS MODERATE 35: CPT | Performed by: STUDENT IN AN ORGANIZED HEALTH CARE EDUCATION/TRAINING PROGRAM

## 2024-01-01 PROCEDURE — 83735 ASSAY OF MAGNESIUM: CPT

## 2024-01-01 PROCEDURE — 120N000001 HC R&B MED SURG/OB

## 2024-01-01 PROCEDURE — 250N000012 HC RX MED GY IP 250 OP 636 PS 637: Performed by: HOSPITALIST

## 2024-01-01 PROCEDURE — 250N000013 HC RX MED GY IP 250 OP 250 PS 637: Performed by: HOSPITALIST

## 2024-01-01 PROCEDURE — 250N000013 HC RX MED GY IP 250 OP 250 PS 637: Performed by: STUDENT IN AN ORGANIZED HEALTH CARE EDUCATION/TRAINING PROGRAM

## 2024-01-01 PROCEDURE — 80048 BASIC METABOLIC PNL TOTAL CA: CPT | Performed by: HOSPITALIST

## 2024-01-01 PROCEDURE — 82565 ASSAY OF CREATININE: CPT | Performed by: HOSPITALIST

## 2024-01-01 PROCEDURE — 96372 THER/PROPH/DIAG INJ SC/IM: CPT | Performed by: STUDENT IN AN ORGANIZED HEALTH CARE EDUCATION/TRAINING PROGRAM

## 2024-01-01 PROCEDURE — G0378 HOSPITAL OBSERVATION PER HR: HCPCS

## 2024-01-01 PROCEDURE — 84132 ASSAY OF SERUM POTASSIUM: CPT | Performed by: HOSPITALIST

## 2024-01-01 PROCEDURE — 250N000013 HC RX MED GY IP 250 OP 250 PS 637: Performed by: PHYSICIAN ASSISTANT

## 2024-01-01 PROCEDURE — 96374 THER/PROPH/DIAG INJ IV PUSH: CPT

## 2024-01-01 PROCEDURE — 87633 RESP VIRUS 12-25 TARGETS: CPT | Mod: ORL | Performed by: NURSE PRACTITIONER

## 2024-01-01 PROCEDURE — 83735 ASSAY OF MAGNESIUM: CPT | Performed by: NURSE PRACTITIONER

## 2024-01-01 PROCEDURE — 71045 X-RAY EXAM CHEST 1 VIEW: CPT

## 2024-01-01 PROCEDURE — 250N000013 HC RX MED GY IP 250 OP 250 PS 637: Performed by: NURSE PRACTITIONER

## 2024-01-01 PROCEDURE — 99349 HOME/RES VST EST MOD MDM 40: CPT | Performed by: NURSE PRACTITIONER

## 2024-01-01 PROCEDURE — 36415 COLL VENOUS BLD VENIPUNCTURE: CPT | Performed by: HOSPITALIST

## 2024-01-01 PROCEDURE — 258N000003 HC RX IP 258 OP 636: Performed by: EMERGENCY MEDICINE

## 2024-01-01 PROCEDURE — 94640 AIRWAY INHALATION TREATMENT: CPT | Mod: 76

## 2024-01-01 PROCEDURE — 85025 COMPLETE CBC W/AUTO DIFF WBC: CPT | Performed by: NURSE PRACTITIONER

## 2024-01-01 PROCEDURE — 93005 ELECTROCARDIOGRAM TRACING: CPT

## 2024-01-01 PROCEDURE — 999N000157 HC STATISTIC RCP TIME EA 10 MIN

## 2024-01-01 PROCEDURE — 94640 AIRWAY INHALATION TREATMENT: CPT

## 2024-01-01 PROCEDURE — 36415 COLL VENOUS BLD VENIPUNCTURE: CPT | Performed by: PHYSICIAN ASSISTANT

## 2024-01-01 PROCEDURE — 250N000009 HC RX 250: Performed by: HOSPITALIST

## 2024-01-01 PROCEDURE — 272N000272 HC CONTINUOUS NEBULIZER MICRO PUMP

## 2024-01-01 PROCEDURE — 250N000009 HC RX 250: Performed by: EMERGENCY MEDICINE

## 2024-01-01 PROCEDURE — 250N000011 HC RX IP 250 OP 636: Performed by: HOSPITALIST

## 2024-01-01 PROCEDURE — 84132 ASSAY OF SERUM POTASSIUM: CPT | Performed by: NURSE PRACTITIONER

## 2024-01-01 PROCEDURE — 36415 COLL VENOUS BLD VENIPUNCTURE: CPT | Mod: ORL

## 2024-01-01 PROCEDURE — 999N000156 HC STATISTIC RCP CONSULT EA 30 MIN

## 2024-01-01 PROCEDURE — 99238 HOSP IP/OBS DSCHRG MGMT 30/<: CPT

## 2024-01-01 PROCEDURE — 87633 RESP VIRUS 12-25 TARGETS: CPT | Mod: ORL

## 2024-01-01 PROCEDURE — 84484 ASSAY OF TROPONIN QUANT: CPT | Performed by: NURSE PRACTITIONER

## 2024-01-01 PROCEDURE — 96376 TX/PRO/DX INJ SAME DRUG ADON: CPT

## 2024-01-01 PROCEDURE — 99285 EMERGENCY DEPT VISIT HI MDM: CPT | Mod: 25

## 2024-01-01 PROCEDURE — 93010 ELECTROCARDIOGRAM REPORT: CPT | Performed by: INTERNAL MEDICINE

## 2024-01-01 PROCEDURE — 80048 BASIC METABOLIC PNL TOTAL CA: CPT | Mod: ORL | Performed by: NURSE PRACTITIONER

## 2024-01-01 PROCEDURE — 99348 HOME/RES VST EST LOW MDM 30: CPT | Performed by: NURSE PRACTITIONER

## 2024-01-01 PROCEDURE — 84132 ASSAY OF SERUM POTASSIUM: CPT | Performed by: PHYSICIAN ASSISTANT

## 2024-01-01 PROCEDURE — 250N000011 HC RX IP 250 OP 636: Performed by: STUDENT IN AN ORGANIZED HEALTH CARE EDUCATION/TRAINING PROGRAM

## 2024-01-01 PROCEDURE — 84484 ASSAY OF TROPONIN QUANT: CPT | Performed by: STUDENT IN AN ORGANIZED HEALTH CARE EDUCATION/TRAINING PROGRAM

## 2024-01-01 PROCEDURE — 36415 COLL VENOUS BLD VENIPUNCTURE: CPT

## 2024-01-01 PROCEDURE — 82374 ASSAY BLOOD CARBON DIOXIDE: CPT | Performed by: NURSE PRACTITIONER

## 2024-01-01 PROCEDURE — 82565 ASSAY OF CREATININE: CPT | Performed by: STUDENT IN AN ORGANIZED HEALTH CARE EDUCATION/TRAINING PROGRAM

## 2024-01-01 PROCEDURE — 999N000215 HC STATISTIC HFNC ADULT NON-CPAP

## 2024-01-01 PROCEDURE — 84100 ASSAY OF PHOSPHORUS: CPT | Performed by: NURSE PRACTITIONER

## 2024-01-01 PROCEDURE — 87637 SARSCOV2&INF A&B&RSV AMP PRB: CPT | Performed by: EMERGENCY MEDICINE

## 2024-01-01 PROCEDURE — 85014 HEMATOCRIT: CPT | Performed by: STUDENT IN AN ORGANIZED HEALTH CARE EDUCATION/TRAINING PROGRAM

## 2024-01-01 PROCEDURE — 84132 ASSAY OF SERUM POTASSIUM: CPT

## 2024-01-01 PROCEDURE — 80048 BASIC METABOLIC PNL TOTAL CA: CPT | Performed by: EMERGENCY MEDICINE

## 2024-01-01 PROCEDURE — 36415 COLL VENOUS BLD VENIPUNCTURE: CPT | Performed by: EMERGENCY MEDICINE

## 2024-01-01 PROCEDURE — 99347 HOME/RES VST EST SF MDM 20: CPT | Performed by: NURSE PRACTITIONER

## 2024-01-01 PROCEDURE — 250N000009 HC RX 250: Performed by: NURSE PRACTITIONER

## 2024-01-01 PROCEDURE — 99348 HOME/RES VST EST LOW MDM 30: CPT | Performed by: INTERNAL MEDICINE

## 2024-01-01 PROCEDURE — 84145 PROCALCITONIN (PCT): CPT | Performed by: NURSE PRACTITIONER

## 2024-01-01 PROCEDURE — 36415 COLL VENOUS BLD VENIPUNCTURE: CPT | Performed by: STUDENT IN AN ORGANIZED HEALTH CARE EDUCATION/TRAINING PROGRAM

## 2024-01-01 PROCEDURE — 83735 ASSAY OF MAGNESIUM: CPT | Performed by: PHYSICIAN ASSISTANT

## 2024-01-01 PROCEDURE — 85025 COMPLETE CBC W/AUTO DIFF WBC: CPT | Performed by: EMERGENCY MEDICINE

## 2024-01-01 PROCEDURE — 36415 COLL VENOUS BLD VENIPUNCTURE: CPT | Mod: ORL | Performed by: NURSE PRACTITIONER

## 2024-01-01 PROCEDURE — 99223 1ST HOSP IP/OBS HIGH 75: CPT | Performed by: HOSPITALIST

## 2024-01-01 PROCEDURE — 99232 SBSQ HOSP IP/OBS MODERATE 35: CPT | Performed by: PHYSICIAN ASSISTANT

## 2024-01-01 PROCEDURE — 96375 TX/PRO/DX INJ NEW DRUG ADDON: CPT

## 2024-01-01 PROCEDURE — 83735 ASSAY OF MAGNESIUM: CPT | Performed by: HOSPITALIST

## 2024-01-01 PROCEDURE — 258N000003 HC RX IP 258 OP 636: Performed by: HOSPITALIST

## 2024-01-01 PROCEDURE — 80048 BASIC METABOLIC PNL TOTAL CA: CPT | Mod: ORL

## 2024-01-01 PROCEDURE — 82805 BLOOD GASES W/O2 SATURATION: CPT | Performed by: NURSE PRACTITIONER

## 2024-01-01 PROCEDURE — 87581 M.PNEUMON DNA AMP PROBE: CPT | Mod: ORL

## 2024-01-01 PROCEDURE — 83880 ASSAY OF NATRIURETIC PEPTIDE: CPT | Performed by: EMERGENCY MEDICINE

## 2024-01-01 PROCEDURE — 84132 ASSAY OF SERUM POTASSIUM: CPT | Performed by: STUDENT IN AN ORGANIZED HEALTH CARE EDUCATION/TRAINING PROGRAM

## 2024-01-01 PROCEDURE — G0180 MD CERTIFICATION HHA PATIENT: HCPCS | Performed by: NURSE PRACTITIONER

## 2024-01-01 PROCEDURE — 96361 HYDRATE IV INFUSION ADD-ON: CPT

## 2024-01-01 PROCEDURE — 250N000011 HC RX IP 250 OP 636: Mod: JZ | Performed by: INTERNAL MEDICINE

## 2024-01-01 PROCEDURE — 85379 FIBRIN DEGRADATION QUANT: CPT | Performed by: EMERGENCY MEDICINE

## 2024-01-01 PROCEDURE — 83605 ASSAY OF LACTIC ACID: CPT | Performed by: NURSE PRACTITIONER

## 2024-01-01 PROCEDURE — 250N000011 HC RX IP 250 OP 636: Performed by: NURSE PRACTITIONER

## 2024-01-01 PROCEDURE — 87581 M.PNEUMON DNA AMP PROBE: CPT | Mod: ORL | Performed by: NURSE PRACTITIONER

## 2024-01-01 PROCEDURE — 999N000111 HC STATISTIC OT IP EVAL DEFER

## 2024-01-01 PROCEDURE — 84484 ASSAY OF TROPONIN QUANT: CPT | Performed by: HOSPITALIST

## 2024-01-01 PROCEDURE — P9604 ONE-WAY ALLOW PRORATED TRIP: HCPCS | Mod: ORL | Performed by: NURSE PRACTITIONER

## 2024-01-01 PROCEDURE — 85025 COMPLETE CBC W/AUTO DIFF WBC: CPT | Performed by: HOSPITALIST

## 2024-01-01 PROCEDURE — 80048 BASIC METABOLIC PNL TOTAL CA: CPT | Performed by: NURSE PRACTITIONER

## 2024-01-01 PROCEDURE — 999N000147 HC STATISTIC PT IP EVAL DEFER

## 2024-01-01 PROCEDURE — 82805 BLOOD GASES W/O2 SATURATION: CPT | Performed by: EMERGENCY MEDICINE

## 2024-01-01 PROCEDURE — 71046 X-RAY EXAM CHEST 2 VIEWS: CPT

## 2024-01-01 PROCEDURE — 272N000054 HC CANNULA HIGH FLOW, ADULT

## 2024-01-01 RX ORDER — FLUTICASONE FUROATE AND VILANTEROL 200; 25 UG/1; UG/1
1 POWDER RESPIRATORY (INHALATION) DAILY
Status: DISCONTINUED | OUTPATIENT
Start: 2024-01-01 | End: 2024-01-01 | Stop reason: HOSPADM

## 2024-01-01 RX ORDER — PREDNISONE 20 MG/1
TABLET ORAL
Qty: 10 TABLET | Refills: 0 | Status: SHIPPED | OUTPATIENT
Start: 2024-01-01 | End: 2024-01-01

## 2024-01-01 RX ORDER — AMOXICILLIN 250 MG
1 CAPSULE ORAL 2 TIMES DAILY PRN
Status: DISCONTINUED | OUTPATIENT
Start: 2024-01-01 | End: 2024-01-01 | Stop reason: HOSPADM

## 2024-01-01 RX ORDER — NALOXONE HYDROCHLORIDE 0.4 MG/ML
0.2 INJECTION, SOLUTION INTRAMUSCULAR; INTRAVENOUS; SUBCUTANEOUS
Status: DISCONTINUED | OUTPATIENT
Start: 2024-01-01 | End: 2024-01-01 | Stop reason: HOSPADM

## 2024-01-01 RX ORDER — AMOXICILLIN 250 MG
2 CAPSULE ORAL 2 TIMES DAILY PRN
Status: DISCONTINUED | OUTPATIENT
Start: 2024-01-01 | End: 2024-01-01 | Stop reason: HOSPADM

## 2024-01-01 RX ORDER — ALBUTEROL SULFATE 0.83 MG/ML
2.5 SOLUTION RESPIRATORY (INHALATION)
Status: DISCONTINUED | OUTPATIENT
Start: 2024-01-01 | End: 2024-01-01

## 2024-01-01 RX ORDER — CITALOPRAM HYDROBROMIDE 10 MG/1
10 TABLET ORAL DAILY
Start: 2024-01-01 | End: 2024-01-01

## 2024-01-01 RX ORDER — LANOLIN ALCOHOL/MO/W.PET/CERES
3 CREAM (GRAM) TOPICAL AT BEDTIME
Qty: 30 TABLET | Refills: 1 | Status: SHIPPED | OUTPATIENT
Start: 2024-01-01 | End: 2024-01-01

## 2024-01-01 RX ORDER — LEVALBUTEROL INHALATION SOLUTION 0.63 MG/3ML
0.63 SOLUTION RESPIRATORY (INHALATION) EVERY 4 HOURS PRN
Status: DISCONTINUED | OUTPATIENT
Start: 2024-01-01 | End: 2024-01-01

## 2024-01-01 RX ORDER — PREDNISONE 20 MG/1
40 TABLET ORAL DAILY
Status: DISCONTINUED | OUTPATIENT
Start: 2024-01-01 | End: 2024-01-01

## 2024-01-01 RX ORDER — HYDROXYZINE HCL 25 MG
12.5 TABLET ORAL 2 TIMES DAILY
Status: DISCONTINUED | OUTPATIENT
Start: 2024-01-01 | End: 2024-01-01 | Stop reason: HOSPADM

## 2024-01-01 RX ORDER — LANOLIN ALCOHOL/MO/W.PET/CERES
3 CREAM (GRAM) TOPICAL AT BEDTIME
Qty: 90 TABLET | Refills: 3 | Status: SHIPPED | OUTPATIENT
Start: 2024-01-01

## 2024-01-01 RX ORDER — METOPROLOL TARTRATE 1 MG/ML
5 INJECTION, SOLUTION INTRAVENOUS EVERY 5 MIN PRN
Status: DISCONTINUED | OUTPATIENT
Start: 2024-01-01 | End: 2024-01-01 | Stop reason: HOSPADM

## 2024-01-01 RX ORDER — ONDANSETRON 2 MG/ML
4 INJECTION INTRAMUSCULAR; INTRAVENOUS EVERY 6 HOURS PRN
Status: DISCONTINUED | OUTPATIENT
Start: 2024-01-01 | End: 2024-01-01 | Stop reason: HOSPADM

## 2024-01-01 RX ORDER — DIPHENHYDRAMINE HYDROCHLORIDE 50 MG/ML
25 INJECTION INTRAMUSCULAR; INTRAVENOUS ONCE
Status: COMPLETED | OUTPATIENT
Start: 2024-01-01 | End: 2024-01-01

## 2024-01-01 RX ORDER — VALSARTAN 80 MG/1
80 TABLET ORAL DAILY
Status: DISCONTINUED | OUTPATIENT
Start: 2024-01-01 | End: 2024-01-01

## 2024-01-01 RX ORDER — LORAZEPAM 2 MG/ML
0.5 INJECTION INTRAMUSCULAR ONCE
Status: DISCONTINUED | OUTPATIENT
Start: 2024-01-01 | End: 2024-01-01

## 2024-01-01 RX ORDER — OXYCODONE HYDROCHLORIDE 5 MG/1
TABLET ORAL
Qty: 60 TABLET | Refills: 0 | Status: SHIPPED | OUTPATIENT
Start: 2024-01-01 | End: 2024-01-01

## 2024-01-01 RX ORDER — FAMOTIDINE 10 MG
10 TABLET ORAL 2 TIMES DAILY
Status: DISCONTINUED | OUTPATIENT
Start: 2024-01-01 | End: 2024-01-01 | Stop reason: HOSPADM

## 2024-01-01 RX ORDER — NALOXONE HYDROCHLORIDE 0.4 MG/ML
0.4 INJECTION, SOLUTION INTRAMUSCULAR; INTRAVENOUS; SUBCUTANEOUS
Status: DISCONTINUED | OUTPATIENT
Start: 2024-01-01 | End: 2024-01-01 | Stop reason: HOSPADM

## 2024-01-01 RX ORDER — HYDROXYZINE HYDROCHLORIDE 25 MG/1
TABLET, FILM COATED ORAL
Qty: 90 TABLET | Refills: 3 | Status: SHIPPED | OUTPATIENT
Start: 2024-01-01

## 2024-01-01 RX ORDER — OXYCODONE HYDROCHLORIDE 5 MG/1
TABLET ORAL
Qty: 105 TABLET | Refills: 0 | Status: SHIPPED | OUTPATIENT
Start: 2024-01-01

## 2024-01-01 RX ORDER — DOCUSATE SODIUM 50MG AND SENNOSIDES 8.6MG 8.6; 5 MG/1; MG/1
TABLET, FILM COATED ORAL
Qty: 90 TABLET | Refills: 97 | Status: SHIPPED | OUTPATIENT
Start: 2024-01-01

## 2024-01-01 RX ORDER — HYDRALAZINE HYDROCHLORIDE 10 MG/1
10 TABLET, FILM COATED ORAL 4 TIMES DAILY PRN
Status: DISCONTINUED | OUTPATIENT
Start: 2024-01-01 | End: 2024-01-01 | Stop reason: HOSPADM

## 2024-01-01 RX ORDER — FLUTICASONE PROPIONATE 50 MCG
1 SPRAY, SUSPENSION (ML) NASAL DAILY
Status: DISCONTINUED | OUTPATIENT
Start: 2024-01-01 | End: 2024-01-01 | Stop reason: HOSPADM

## 2024-01-01 RX ORDER — HALOPERIDOL 5 MG/ML
2 INJECTION INTRAMUSCULAR EVERY 6 HOURS PRN
Status: DISCONTINUED | OUTPATIENT
Start: 2024-01-01 | End: 2024-01-01

## 2024-01-01 RX ORDER — VITAMIN B COMPLEX
50 TABLET ORAL DAILY
Status: DISCONTINUED | OUTPATIENT
Start: 2024-01-01 | End: 2024-01-01 | Stop reason: HOSPADM

## 2024-01-01 RX ORDER — ENOXAPARIN SODIUM 100 MG/ML
40 INJECTION SUBCUTANEOUS EVERY 24 HOURS
Status: DISCONTINUED | OUTPATIENT
Start: 2024-01-01 | End: 2024-01-01 | Stop reason: HOSPADM

## 2024-01-01 RX ORDER — MAGNESIUM OXIDE 400 MG/1
400 TABLET ORAL EVERY 4 HOURS
Status: COMPLETED | OUTPATIENT
Start: 2024-01-01 | End: 2024-01-01

## 2024-01-01 RX ORDER — PSEUDOEPHED/ACETAMINOPH/DIPHEN 30MG-500MG
1000 TABLET ORAL 3 TIMES DAILY
Start: 2024-01-01 | End: 2024-01-01

## 2024-01-01 RX ORDER — SODIUM CHLORIDE 9 MG/ML
INJECTION, SOLUTION INTRAVENOUS CONTINUOUS
Status: ACTIVE | OUTPATIENT
Start: 2024-01-01 | End: 2024-01-01

## 2024-01-01 RX ORDER — IPRATROPIUM BROMIDE AND ALBUTEROL SULFATE 2.5; .5 MG/3ML; MG/3ML
3 SOLUTION RESPIRATORY (INHALATION) ONCE
Status: COMPLETED | OUTPATIENT
Start: 2024-01-01 | End: 2024-01-01

## 2024-01-01 RX ORDER — POTASSIUM CHLORIDE 1.5 G/1.58G
20 POWDER, FOR SOLUTION ORAL ONCE
Status: COMPLETED | OUTPATIENT
Start: 2024-01-01 | End: 2024-01-01

## 2024-01-01 RX ORDER — CARBOXYMETHYLCELLULOSE SODIUM 5 MG/ML
1 SOLUTION/ DROPS OPHTHALMIC
Status: DISCONTINUED | OUTPATIENT
Start: 2024-01-01 | End: 2024-01-01 | Stop reason: HOSPADM

## 2024-01-01 RX ORDER — GUAIFENESIN 200 MG/10ML
15 LIQUID ORAL 3 TIMES DAILY
Status: ON HOLD | COMMUNITY
Start: 2024-01-01 | End: 2024-01-01

## 2024-01-01 RX ORDER — LEVALBUTEROL INHALATION SOLUTION 0.63 MG/3ML
0.63 SOLUTION RESPIRATORY (INHALATION)
Status: DISCONTINUED | OUTPATIENT
Start: 2024-01-01 | End: 2024-01-01 | Stop reason: HOSPADM

## 2024-01-01 RX ORDER — METOPROLOL TARTRATE 25 MG/1
25 TABLET, FILM COATED ORAL 2 TIMES DAILY
Status: DISCONTINUED | OUTPATIENT
Start: 2024-01-01 | End: 2024-01-01

## 2024-01-01 RX ORDER — PSEUDOEPHED/ACETAMINOPH/DIPHEN 30MG-500MG
TABLET ORAL
Qty: 540 TABLET | Refills: 3 | Status: SHIPPED | OUTPATIENT
Start: 2024-01-01

## 2024-01-01 RX ORDER — ONDANSETRON 4 MG/1
4 TABLET, ORALLY DISINTEGRATING ORAL EVERY 6 HOURS PRN
Status: DISCONTINUED | OUTPATIENT
Start: 2024-01-01 | End: 2024-01-01 | Stop reason: HOSPADM

## 2024-01-01 RX ORDER — LORATADINE 10 MG/1
10 TABLET ORAL DAILY
Status: DISCONTINUED | OUTPATIENT
Start: 2024-01-01 | End: 2024-01-01 | Stop reason: HOSPADM

## 2024-01-01 RX ORDER — ACETAMINOPHEN 325 MG/1
650 TABLET ORAL EVERY 6 HOURS PRN
Status: DISCONTINUED | OUTPATIENT
Start: 2024-01-01 | End: 2024-01-01 | Stop reason: HOSPADM

## 2024-01-01 RX ORDER — HALOPERIDOL 5 MG/ML
1 INJECTION INTRAMUSCULAR EVERY 6 HOURS PRN
Status: DISCONTINUED | OUTPATIENT
Start: 2024-01-01 | End: 2024-01-01 | Stop reason: HOSPADM

## 2024-01-01 RX ORDER — CITALOPRAM HYDROBROMIDE 10 MG/1
10 TABLET ORAL DAILY
Qty: 90 TABLET | Refills: 3 | Status: SHIPPED | OUTPATIENT
Start: 2024-01-01

## 2024-01-01 RX ORDER — ALBUTEROL SULFATE 0.83 MG/ML
2.5 SOLUTION RESPIRATORY (INHALATION) EVERY 6 HOURS PRN
Status: DISCONTINUED | OUTPATIENT
Start: 2024-01-01 | End: 2024-01-01 | Stop reason: HOSPADM

## 2024-01-01 RX ORDER — OXYCODONE HYDROCHLORIDE 5 MG/1
5 TABLET ORAL EVERY 6 HOURS PRN
Status: DISCONTINUED | OUTPATIENT
Start: 2024-01-01 | End: 2024-01-01 | Stop reason: HOSPADM

## 2024-01-01 RX ORDER — PREDNISONE 20 MG/1
20 TABLET ORAL DAILY
Qty: 5 TABLET | Refills: 0 | Status: ON HOLD | OUTPATIENT
Start: 2024-01-01 | End: 2024-01-01

## 2024-01-01 RX ORDER — FLUTICASONE PROPIONATE 50 MCG
SPRAY, SUSPENSION (ML) NASAL
Qty: 16 G | Refills: 11 | Status: SHIPPED | OUTPATIENT
Start: 2024-01-01

## 2024-01-01 RX ORDER — MULTIPLE VITAMINS W/ MINERALS TAB 9MG-400MCG
1 TAB ORAL DAILY
Status: DISCONTINUED | OUTPATIENT
Start: 2024-01-01 | End: 2024-01-01 | Stop reason: HOSPADM

## 2024-01-01 RX ORDER — POTASSIUM CHLORIDE 1.5 G/1.58G
40 POWDER, FOR SOLUTION ORAL ONCE
Status: COMPLETED | OUTPATIENT
Start: 2024-01-01 | End: 2024-01-01

## 2024-01-01 RX ORDER — IPRATROPIUM BROMIDE AND ALBUTEROL SULFATE 2.5; .5 MG/3ML; MG/3ML
3 SOLUTION RESPIRATORY (INHALATION)
Status: DISCONTINUED | OUTPATIENT
Start: 2024-01-01 | End: 2024-01-01

## 2024-01-01 RX ORDER — OXYCODONE HYDROCHLORIDE 5 MG/1
7.5 TABLET ORAL EVERY MORNING
COMMUNITY
End: 2024-01-01

## 2024-01-01 RX ORDER — ALBUTEROL SULFATE 0.83 MG/ML
1 SOLUTION RESPIRATORY (INHALATION) 2 TIMES DAILY
Status: ON HOLD | COMMUNITY
Start: 2024-01-01 | End: 2024-01-01

## 2024-01-01 RX ADMIN — IPRATROPIUM BROMIDE AND ALBUTEROL SULFATE 3 ML: .5; 3 SOLUTION RESPIRATORY (INHALATION) at 20:39

## 2024-01-01 RX ADMIN — IPRATROPIUM BROMIDE AND ALBUTEROL SULFATE 3 ML: .5; 3 SOLUTION RESPIRATORY (INHALATION) at 15:16

## 2024-01-01 RX ADMIN — QUETIAPINE FUMARATE 12.5 MG: 25 TABLET ORAL at 17:52

## 2024-01-01 RX ADMIN — HYDROXYZINE HYDROCHLORIDE 12.5 MG: 25 TABLET, FILM COATED ORAL at 20:23

## 2024-01-01 RX ADMIN — DICLOFENAC SODIUM TOPICAL GEL, 1% 2 G: 10 GEL TOPICAL at 08:50

## 2024-01-01 RX ADMIN — LEVALBUTEROL HYDROCHLORIDE 0.63 MG: 0.63 SOLUTION RESPIRATORY (INHALATION) at 16:20

## 2024-01-01 RX ADMIN — DICLOFENAC SODIUM TOPICAL GEL, 1% 2 G: 10 GEL TOPICAL at 20:39

## 2024-01-01 RX ADMIN — LORATADINE 10 MG: 10 TABLET ORAL at 08:38

## 2024-01-01 RX ADMIN — Medication 50 MCG: at 08:46

## 2024-01-01 RX ADMIN — DICLOFENAC SODIUM TOPICAL GEL, 1% 2 G: 10 GEL TOPICAL at 13:16

## 2024-01-01 RX ADMIN — SODIUM CHLORIDE 1000 ML: 9 INJECTION, SOLUTION INTRAVENOUS at 20:50

## 2024-01-01 RX ADMIN — DICLOFENAC SODIUM TOPICAL GEL, 1% 2 G: 10 GEL TOPICAL at 08:52

## 2024-01-01 RX ADMIN — Medication 1 TABLET: at 08:52

## 2024-01-01 RX ADMIN — QUETIAPINE FUMARATE 12.5 MG: 25 TABLET ORAL at 14:20

## 2024-01-01 RX ADMIN — LORATADINE 10 MG: 10 TABLET ORAL at 09:02

## 2024-01-01 RX ADMIN — ENOXAPARIN SODIUM 40 MG: 40 INJECTION SUBCUTANEOUS at 15:34

## 2024-01-01 RX ADMIN — Medication 50 MCG: at 09:02

## 2024-01-01 RX ADMIN — FAMOTIDINE 10 MG: 10 TABLET, FILM COATED ORAL at 20:39

## 2024-01-01 RX ADMIN — Medication 50 MCG: at 08:16

## 2024-01-01 RX ADMIN — Medication 50 MCG: at 08:37

## 2024-01-01 RX ADMIN — METOPROLOL TARTRATE 5 MG: 5 INJECTION INTRAVENOUS at 16:39

## 2024-01-01 RX ADMIN — VALSARTAN 60 MG: 40 TABLET, FILM COATED ORAL at 09:22

## 2024-01-01 RX ADMIN — FAMOTIDINE 20 MG: 10 INJECTION, SOLUTION INTRAVENOUS at 00:21

## 2024-01-01 RX ADMIN — ACETAMINOPHEN 650 MG: 325 TABLET, FILM COATED ORAL at 05:44

## 2024-01-01 RX ADMIN — HYDROXYZINE HYDROCHLORIDE 12.5 MG: 25 TABLET, FILM COATED ORAL at 20:39

## 2024-01-01 RX ADMIN — FAMOTIDINE 10 MG: 10 TABLET, FILM COATED ORAL at 20:23

## 2024-01-01 RX ADMIN — METOPROLOL TARTRATE 25 MG: 25 TABLET, FILM COATED ORAL at 20:45

## 2024-01-01 RX ADMIN — FAMOTIDINE 20 MG: 10 INJECTION, SOLUTION INTRAVENOUS at 12:24

## 2024-01-01 RX ADMIN — SODIUM CHLORIDE: 9 INJECTION, SOLUTION INTRAVENOUS at 00:18

## 2024-01-01 RX ADMIN — IPRATROPIUM BROMIDE AND ALBUTEROL SULFATE 3 ML: .5; 3 SOLUTION RESPIRATORY (INHALATION) at 20:15

## 2024-01-01 RX ADMIN — ENOXAPARIN SODIUM 40 MG: 40 INJECTION SUBCUTANEOUS at 14:14

## 2024-01-01 RX ADMIN — HYDROXYZINE HYDROCHLORIDE 12.5 MG: 25 TABLET, FILM COATED ORAL at 08:48

## 2024-01-01 RX ADMIN — FAMOTIDINE 10 MG: 10 TABLET, FILM COATED ORAL at 08:53

## 2024-01-01 RX ADMIN — METOPROLOL TARTRATE 25 MG: 25 TABLET, FILM COATED ORAL at 09:01

## 2024-01-01 RX ADMIN — FLUTICASONE FUROATE AND VILANTEROL TRIFENATATE 1 PUFF: 200; 25 POWDER RESPIRATORY (INHALATION) at 08:51

## 2024-01-01 RX ADMIN — IPRATROPIUM BROMIDE AND ALBUTEROL SULFATE 3 ML: .5; 3 SOLUTION RESPIRATORY (INHALATION) at 12:25

## 2024-01-01 RX ADMIN — FAMOTIDINE 20 MG: 10 INJECTION, SOLUTION INTRAVENOUS at 23:59

## 2024-01-01 RX ADMIN — METOPROLOL TARTRATE 25 MG: 25 TABLET, FILM COATED ORAL at 21:23

## 2024-01-01 RX ADMIN — FLUTICASONE PROPIONATE 1 SPRAY: 50 SPRAY, METERED NASAL at 09:02

## 2024-01-01 RX ADMIN — IPRATROPIUM BROMIDE AND ALBUTEROL SULFATE 3 ML: .5; 3 SOLUTION RESPIRATORY (INHALATION) at 07:33

## 2024-01-01 RX ADMIN — HYDROXYZINE HYDROCHLORIDE 12.5 MG: 25 TABLET, FILM COATED ORAL at 10:13

## 2024-01-01 RX ADMIN — Medication 50 MCG: at 12:24

## 2024-01-01 RX ADMIN — POTASSIUM CHLORIDE 20 MEQ: 1.5 POWDER, FOR SOLUTION ORAL at 09:23

## 2024-01-01 RX ADMIN — ALBUTEROL SULFATE 2.5 MG: 2.5 SOLUTION RESPIRATORY (INHALATION) at 14:46

## 2024-01-01 RX ADMIN — FAMOTIDINE 20 MG: 10 INJECTION, SOLUTION INTRAVENOUS at 12:32

## 2024-01-01 RX ADMIN — VALSARTAN 60 MG: 40 TABLET, FILM COATED ORAL at 08:38

## 2024-01-01 RX ADMIN — FAMOTIDINE 20 MG: 10 INJECTION, SOLUTION INTRAVENOUS at 00:51

## 2024-01-01 RX ADMIN — IPRATROPIUM BROMIDE AND ALBUTEROL SULFATE 3 ML: .5; 3 SOLUTION RESPIRATORY (INHALATION) at 22:07

## 2024-01-01 RX ADMIN — Medication 1 TABLET: at 17:31

## 2024-01-01 RX ADMIN — VALSARTAN 60 MG: 40 TABLET, FILM COATED ORAL at 09:02

## 2024-01-01 RX ADMIN — IPRATROPIUM BROMIDE AND ALBUTEROL SULFATE 3 ML: .5; 3 SOLUTION RESPIRATORY (INHALATION) at 20:19

## 2024-01-01 RX ADMIN — PREDNISONE 40 MG: 20 TABLET ORAL at 09:23

## 2024-01-01 RX ADMIN — DICLOFENAC SODIUM TOPICAL GEL, 1% 2 G: 10 GEL TOPICAL at 13:12

## 2024-01-01 RX ADMIN — LORATADINE 10 MG: 10 TABLET ORAL at 08:16

## 2024-01-01 RX ADMIN — HYDRALAZINE HYDROCHLORIDE 10 MG: 10 TABLET, FILM COATED ORAL at 17:52

## 2024-01-01 RX ADMIN — PREDNISONE 40 MG: 20 TABLET ORAL at 09:01

## 2024-01-01 RX ADMIN — IPRATROPIUM BROMIDE AND ALBUTEROL SULFATE 3 ML: .5; 3 SOLUTION RESPIRATORY (INHALATION) at 08:13

## 2024-01-01 RX ADMIN — QUETIAPINE FUMARATE 12.5 MG: 25 TABLET ORAL at 21:23

## 2024-01-01 RX ADMIN — METOPROLOL TARTRATE 25 MG: 25 TABLET, FILM COATED ORAL at 08:37

## 2024-01-01 RX ADMIN — FAMOTIDINE 10 MG: 10 TABLET, FILM COATED ORAL at 08:37

## 2024-01-01 RX ADMIN — FLUTICASONE FUROATE AND VILANTEROL TRIFENATATE 1 PUFF: 200; 25 POWDER RESPIRATORY (INHALATION) at 08:41

## 2024-01-01 RX ADMIN — HYDROXYZINE HYDROCHLORIDE 12.5 MG: 25 TABLET, FILM COATED ORAL at 08:38

## 2024-01-01 RX ADMIN — TRAZODONE HYDROCHLORIDE 25 MG: 50 TABLET ORAL at 21:22

## 2024-01-01 RX ADMIN — LORATADINE 10 MG: 10 TABLET ORAL at 08:46

## 2024-01-01 RX ADMIN — Medication 50 MCG: at 08:52

## 2024-01-01 RX ADMIN — HALOPERIDOL LACTATE 1 MG: 5 INJECTION, SOLUTION INTRAMUSCULAR at 15:37

## 2024-01-01 RX ADMIN — HYDROXYZINE HYDROCHLORIDE 12.5 MG: 25 TABLET, FILM COATED ORAL at 20:38

## 2024-01-01 RX ADMIN — HALOPERIDOL LACTATE 1 MG: 5 INJECTION, SOLUTION INTRAMUSCULAR at 02:47

## 2024-01-01 RX ADMIN — ACETAMINOPHEN 650 MG: 325 TABLET, FILM COATED ORAL at 12:23

## 2024-01-01 RX ADMIN — VALSARTAN 60 MG: 40 TABLET, FILM COATED ORAL at 08:16

## 2024-01-01 RX ADMIN — PREDNISONE 40 MG: 20 TABLET ORAL at 08:13

## 2024-01-01 RX ADMIN — METOPROLOL TARTRATE 25 MG: 25 TABLET, FILM COATED ORAL at 15:27

## 2024-01-01 RX ADMIN — ENOXAPARIN SODIUM 40 MG: 40 INJECTION SUBCUTANEOUS at 15:22

## 2024-01-01 RX ADMIN — FAMOTIDINE 20 MG: 10 INJECTION, SOLUTION INTRAVENOUS at 00:20

## 2024-01-01 RX ADMIN — ENOXAPARIN SODIUM 40 MG: 40 INJECTION SUBCUTANEOUS at 14:46

## 2024-01-01 RX ADMIN — VALSARTAN 60 MG: 40 TABLET, FILM COATED ORAL at 08:53

## 2024-01-01 RX ADMIN — METOPROLOL TARTRATE 25 MG: 25 TABLET, FILM COATED ORAL at 08:16

## 2024-01-01 RX ADMIN — IPRATROPIUM BROMIDE AND ALBUTEROL SULFATE 3 ML: .5; 3 SOLUTION RESPIRATORY (INHALATION) at 07:41

## 2024-01-01 RX ADMIN — FLUTICASONE PROPIONATE 1 SPRAY: 50 SPRAY, METERED NASAL at 08:53

## 2024-01-01 RX ADMIN — DICLOFENAC SODIUM TOPICAL GEL, 1% 2 G: 10 GEL TOPICAL at 15:44

## 2024-01-01 RX ADMIN — ALBUTEROL SULFATE 2.5 MG: 2.5 SOLUTION RESPIRATORY (INHALATION) at 08:17

## 2024-01-01 RX ADMIN — METOPROLOL TARTRATE 25 MG: 25 TABLET, FILM COATED ORAL at 20:23

## 2024-01-01 RX ADMIN — ACETAMINOPHEN 650 MG: 325 TABLET, FILM COATED ORAL at 03:07

## 2024-01-01 RX ADMIN — IPRATROPIUM BROMIDE AND ALBUTEROL SULFATE 3 ML: .5; 3 SOLUTION RESPIRATORY (INHALATION) at 11:20

## 2024-01-01 RX ADMIN — IPRATROPIUM BROMIDE AND ALBUTEROL SULFATE 3 ML: .5; 3 SOLUTION RESPIRATORY (INHALATION) at 07:36

## 2024-01-01 RX ADMIN — MAGNESIUM OXIDE TAB 400 MG (241.3 MG ELEMENTAL MG) 400 MG: 400 (241.3 MG) TAB at 09:23

## 2024-01-01 RX ADMIN — IPRATROPIUM BROMIDE AND ALBUTEROL SULFATE 3 ML: .5; 3 SOLUTION RESPIRATORY (INHALATION) at 11:21

## 2024-01-01 RX ADMIN — ACETAMINOPHEN 650 MG: 325 TABLET, FILM COATED ORAL at 09:53

## 2024-01-01 RX ADMIN — Medication 1 TABLET: at 08:48

## 2024-01-01 RX ADMIN — MAGNESIUM OXIDE TAB 400 MG (241.3 MG ELEMENTAL MG) 400 MG: 400 (241.3 MG) TAB at 12:23

## 2024-01-01 RX ADMIN — FLUTICASONE PROPIONATE 1 SPRAY: 50 SPRAY, METERED NASAL at 08:51

## 2024-01-01 RX ADMIN — FAMOTIDINE 10 MG: 10 TABLET, FILM COATED ORAL at 12:29

## 2024-01-01 RX ADMIN — HYDROXYZINE HYDROCHLORIDE 12.5 MG: 25 TABLET, FILM COATED ORAL at 08:52

## 2024-01-01 RX ADMIN — ENOXAPARIN SODIUM 40 MG: 40 INJECTION SUBCUTANEOUS at 14:40

## 2024-01-01 RX ADMIN — LORATADINE 10 MG: 10 TABLET ORAL at 08:53

## 2024-01-01 RX ADMIN — ENOXAPARIN SODIUM 40 MG: 40 INJECTION SUBCUTANEOUS at 15:44

## 2024-01-01 RX ADMIN — PREDNISONE 40 MG: 20 TABLET ORAL at 08:52

## 2024-01-01 RX ADMIN — FAMOTIDINE 10 MG: 10 TABLET, FILM COATED ORAL at 08:48

## 2024-01-01 RX ADMIN — IPRATROPIUM BROMIDE AND ALBUTEROL SULFATE 3 ML: .5; 3 SOLUTION RESPIRATORY (INHALATION) at 15:42

## 2024-01-01 RX ADMIN — IPRATROPIUM BROMIDE AND ALBUTEROL SULFATE 3 ML: .5; 3 SOLUTION RESPIRATORY (INHALATION) at 16:13

## 2024-01-01 RX ADMIN — VALSARTAN 60 MG: 40 TABLET, FILM COATED ORAL at 12:25

## 2024-01-01 RX ADMIN — FLUTICASONE PROPIONATE 1 SPRAY: 50 SPRAY, METERED NASAL at 08:15

## 2024-01-01 RX ADMIN — DIPHENHYDRAMINE HYDROCHLORIDE 25 MG: 50 INJECTION, SOLUTION INTRAMUSCULAR; INTRAVENOUS at 20:45

## 2024-01-01 RX ADMIN — DICLOFENAC SODIUM TOPICAL GEL, 1% 2 G: 10 GEL TOPICAL at 20:27

## 2024-01-01 RX ADMIN — LORATADINE 10 MG: 10 TABLET ORAL at 09:22

## 2024-01-01 RX ADMIN — FLUTICASONE FUROATE AND VILANTEROL TRIFENATATE 1 PUFF: 200; 25 POWDER RESPIRATORY (INHALATION) at 08:52

## 2024-01-01 RX ADMIN — FLUTICASONE PROPIONATE 1 SPRAY: 50 SPRAY, METERED NASAL at 11:37

## 2024-01-01 RX ADMIN — Medication 50 MCG: at 09:23

## 2024-01-01 RX ADMIN — HALOPERIDOL LACTATE 2 MG: 5 INJECTION, SOLUTION INTRAMUSCULAR at 00:20

## 2024-01-01 RX ADMIN — LORATADINE 10 MG: 10 TABLET ORAL at 12:24

## 2024-01-01 RX ADMIN — POTASSIUM CHLORIDE FOR ORAL SOLUTION 40 MEQ: 1.5 POWDER, FOR SOLUTION ORAL at 20:19

## 2024-01-01 RX ADMIN — HYDROXYZINE HYDROCHLORIDE 12.5 MG: 25 TABLET, FILM COATED ORAL at 21:22

## 2024-01-01 RX ADMIN — FLUTICASONE PROPIONATE 1 SPRAY: 50 SPRAY, METERED NASAL at 08:41

## 2024-01-01 RX ADMIN — IPRATROPIUM BROMIDE AND ALBUTEROL SULFATE 3 ML: .5; 3 SOLUTION RESPIRATORY (INHALATION) at 11:59

## 2024-01-01 RX ADMIN — PREDNISONE 40 MG: 20 TABLET ORAL at 08:38

## 2024-01-01 RX ADMIN — PREDNISONE 40 MG: 20 TABLET ORAL at 08:16

## 2024-01-01 ASSESSMENT — ACTIVITIES OF DAILY LIVING (ADL)
ADLS_ACUITY_SCORE: 62
DEPENDENT_IADLS:: CLEANING;COOKING;LAUNDRY;SHOPPING;MEAL PREPARATION;MEDICATION MANAGEMENT;MONEY MANAGEMENT;TRANSPORTATION;INCONTINENCE
ADLS_ACUITY_SCORE: 49
ADLS_ACUITY_SCORE: 60
ADLS_ACUITY_SCORE: 62
ADLS_ACUITY_SCORE: 60
ADLS_ACUITY_SCORE: 58
ADLS_ACUITY_SCORE: 58
ADLS_ACUITY_SCORE: 60
ADLS_ACUITY_SCORE: 58
ADLS_ACUITY_SCORE: 58
ADLS_ACUITY_SCORE: 41
ADLS_ACUITY_SCORE: 49
ADLS_ACUITY_SCORE: 41
ADLS_ACUITY_SCORE: 41
ADLS_ACUITY_SCORE: 50
ADLS_ACUITY_SCORE: 49
ADLS_ACUITY_SCORE: 50
ADLS_ACUITY_SCORE: 62
ADLS_ACUITY_SCORE: 49
ADLS_ACUITY_SCORE: 58
ADLS_ACUITY_SCORE: 52
ADLS_ACUITY_SCORE: 60
ADLS_ACUITY_SCORE: 41
ADLS_ACUITY_SCORE: 60
ADLS_ACUITY_SCORE: 58
ADLS_ACUITY_SCORE: 35
ADLS_ACUITY_SCORE: 42
ADLS_ACUITY_SCORE: 58
ADLS_ACUITY_SCORE: 49
ADLS_ACUITY_SCORE: 62
ADLS_ACUITY_SCORE: 49
ADLS_ACUITY_SCORE: 58
ADLS_ACUITY_SCORE: 58
ADLS_ACUITY_SCORE: 37
ADLS_ACUITY_SCORE: 62
ADLS_ACUITY_SCORE: 60
ADLS_ACUITY_SCORE: 37
ADLS_ACUITY_SCORE: 54
ADLS_ACUITY_SCORE: 37
ADLS_ACUITY_SCORE: 54
ADLS_ACUITY_SCORE: 58
ADLS_ACUITY_SCORE: 41
ADLS_ACUITY_SCORE: 42
ADLS_ACUITY_SCORE: 49
ADLS_ACUITY_SCORE: 54
ADLS_ACUITY_SCORE: 62
ADLS_ACUITY_SCORE: 42
ADLS_ACUITY_SCORE: 50
ADLS_ACUITY_SCORE: 49
ADLS_ACUITY_SCORE: 58
ADLS_ACUITY_SCORE: 49
ADLS_ACUITY_SCORE: 49
ADLS_ACUITY_SCORE: 60
ADLS_ACUITY_SCORE: 54
ADLS_ACUITY_SCORE: 49
ADLS_ACUITY_SCORE: 58
ADLS_ACUITY_SCORE: 35
ADLS_ACUITY_SCORE: 35
ADLS_ACUITY_SCORE: 60
ADLS_ACUITY_SCORE: 49
ADLS_ACUITY_SCORE: 37
ADLS_ACUITY_SCORE: 60
ADLS_ACUITY_SCORE: 58
ADLS_ACUITY_SCORE: 42
ADLS_ACUITY_SCORE: 62
ADLS_ACUITY_SCORE: 58
ADLS_ACUITY_SCORE: 41
ADLS_ACUITY_SCORE: 52
ADLS_ACUITY_SCORE: 42
ADLS_ACUITY_SCORE: 50
ADLS_ACUITY_SCORE: 49
ADLS_ACUITY_SCORE: 62
ADLS_ACUITY_SCORE: 54
ADLS_ACUITY_SCORE: 54
ADLS_ACUITY_SCORE: 60
ADLS_ACUITY_SCORE: 49
ADLS_ACUITY_SCORE: 54
ADLS_ACUITY_SCORE: 42

## 2024-01-02 NOTE — PROGRESS NOTES
[facility] GERIATRICS    Chief Complaint   Patient presents with    RECHECK     HPI:  Edmundo James is a 88 year old  (1935), who is being seen today for an episodic care visit at: No question data found.. Today's concern is:     Patient is an 88 year old female with  PHI significant for HTN, HLD, OA, depression, asthma, and dementia without official work-up.  She has extensive joint degeneration to her hips and knees and primarily w/c bound now.  Has been seen by [facility] for possible THIAGO.  Was initially planned to have surgery in February 2022 but with significant change in patient condition, surgery was cancelled.  She has struggled with anxiety/depressed mood but had significant hyponatremia with selective serotonin reuptake inhibitor, so this was discontinued and family not interested in trying other medication for mood given her reaction. Her mood has since improved     She is seen today to follow-up on multiple co-morbidities.  Her  had influenza in December and she was started on Tamiflu right away and did not come down with influenza herself.  When I saw her in November, we increased her scheduled oxycodone from 5mg to 7.5mg given ongoing pain and screaming in the A.M when staff attempting to get her up.  Per staff, increased dose appears to be helpful.  We have discussed risk vs.benefit of narcotics with patient and family in the past and they are aware and feel benefit outweigh given QOL and pain.  She is also on hydroxyzine for intractable itching.  We did attempt a taper in the past and itching significantly worsened.  Multiple other modalities were tried without success and patient/family agreeable to med and potential s/es.  I discussed trying another taper today and Elvira and her  are not wanting to do this.  Medication has been helpful and  does not feel he has seen s/e's.  Patient denies short of breath, CP, n/v.  She is on Citrucel for loose stools, has not had  "any loose stools recently and agreeable to try move med to prn and monitor, given she is on higher dose of narcotic now and may not need med.      Allergies, and PMH/PSH reviewed in [facility] today.  REVIEW OF SYSTEMS:  Limited secondary to cognitive impairment but as noted in HPI    Objective:   BP 98/74   Pulse 78   Temp 97.2  F (36.2  C)   Resp 18   Ht 1.6 m (5' 3\")   Wt 66.7 kg (147 lb)   BMI 26.04 kg/m    GENERAL APPEARANCE:  Alert, in no distress, cooperative, watching tv  RESP:  respiratory effort and palpation of chest normal, lungs clear to auscultation , no respiratory distress, no cough or wheeze during encounter  CV:  Palpation and auscultation of heart done , regular rate and rhythm, no murmur, rub, or gallop, no edema  ABDOMEN:  no guarding or rebound, bowel sounds normal, no masses  M/S:   Gait and station abnormal w/c bound and EZ stand for transfers, impaired ROM to bilateral hips and kness, bilateral kness with flexion contractures and crepitus, kyphosis  PSYCH:  oriented to person and place, memory impaired , affect and mood normal, very little insight and judgement into her situation, poor historian    Exam essentially unchanged from previous visit       Recent labs in [facility] reviewed by me today.     Assessment/Plan:  (R19.5) Loose stools  (primary encounter diagnosis)  Comment: noted previously and improved with Citrucel, has not had reports of loose stool and on increased narcotic dose, so agreeable to try without   Plan: methylcellulose (CITRUCEL) powder move to prn and monitor             (G30.1,  F02.80) Late onset Alzheimer's disease without behavioral disturbance (H)  (L28.0) Neurodermatitis  Comment: progressive, impaired language and no longer ambulatory, very poor insight into her situation anxious features at times, resistant to hygiene cares in past but none recently reported  -hydroxyzine was tapered and d'cd in February 2023, but patient with increased itching, hydroxyzine low dose " restarted and has helped relieved itching, anxiety also resolved. To note, she did not tolerate selective serotonin reuptake inhibitor's due to hyponatremia  Plan: staff assist with cares and dispense meds  -hydroxyzine low dose 12.5mg BID for anxiety and itching: multiple other modalities tried without success and this has improved her QOL.  I did discuss attempting another GDR with patient and  today and they are not interested.  Staff to update if not effective, previously was on 25mg BID       (I10) Primary hypertension  Comment: per history, meeting goal   Plan: continue valsartan       (J45.40) Moderate persistent asthma, unspecified whether complicated  Comment: per history, has had some asthma exacerbations in past, none recent   Plan: budesonide-formoterol (SYMBICORT) 160-4.5         MCG/ACT Inhaler        -continue flonase and claritin  -prn aluterol     (M15.9) Primary osteoarthritis involving multiple joints  Comment: to multiple joints, bone on bone knees, was recommended to also have hip replacement but poor surgical candididate  -have discussed risk vs benefit of chronic narcotics, have exhausted non-narcotic options and with extreme pain.  Patient/family aware of potential risks and desire low dose narcotics for QOL   Plan: continue APAP, voltaren and icy hot  -oxycodone 7.5mg po QAM prior to care and getting patient out of med  -monitor bowels, as noted in HPI, history of more loose stools in the past     (J30.1) Allergic rhinitis due to pollen, unspecified seasonality  Comment: per history, improved with Flonase  Plan: continue Flonase      MED REC REQUIRED{Post Medication Reconciliation Status:  Patient was not discharged from an inpatient facility or TCU        Electronically signed by: NADINE Rogers CNP

## 2024-01-02 NOTE — LETTER
1/2/2024        RE: Edmundo James  [address] [place] [address]        [facility] GERIATRICS    Chief Complaint   Patient presents with     RECHECK     HPI:  Edmundo James is a 88 year old  (1935), who is being seen today for an episodic care visit at: No question data found.. Today's concern is:     Patient is an 88 year old female with  PHI significant for HTN, HLD, OA, depression, asthma, and dementia without official work-up.  She has extensive joint degeneration to her hips and knees and primarily w/c bound now.  Has been seen by [facility] for possible THIAGO.  Was initially planned to have surgery in February 2022 but with significant change in patient condition, surgery was cancelled.  She has struggled with anxiety/depressed mood but had significant hyponatremia with selective serotonin reuptake inhibitor, so this was discontinued and family not interested in trying other medication for mood given her reaction. Her mood has since improved     She is seen today to follow-up on multiple co-morbidities.  Her  had influenza in December and she was started on Tamiflu right away and did not come down with influenza herself.  When I saw her in November, we increased her scheduled oxycodone from 5mg to 7.5mg given ongoing pain and screaming in the A.M when staff attempting to get her up.  Per staff, increased dose appears to be helpful.  We have discussed risk vs.benefit of narcotics with patient and family in the past and they are aware and feel benefit outweigh given QOL and pain.  She is also on hydroxyzine for intractable itching.  We did attempt a taper in the past and itching significantly worsened.  Multiple other modalities were tried without success and patient/family agreeable to med and potential s/es.  I discussed trying another taper today and Elvira and her  are not wanting to do this.  Medication has been helpful and  does not feel he has seen s/e's.   "Patient denies short of breath, CP, n/v.  She is on Citrucel for loose stools, has not had any loose stools recently and agreeable to try move med to prn and monitor, given she is on higher dose of narcotic now and may not need med.      Allergies, and PMH/PSH reviewed in [facility] today.  REVIEW OF SYSTEMS:  Limited secondary to cognitive impairment but as noted in HPI    Objective:   BP 98/74   Pulse 78   Temp 97.2  F (36.2  C)   Resp 18   Ht 1.6 m (5' 3\")   Wt 66.7 kg (147 lb)   BMI 26.04 kg/m    GENERAL APPEARANCE:  Alert, in no distress, cooperative, watching tv  RESP:  respiratory effort and palpation of chest normal, lungs clear to auscultation , no respiratory distress, no cough or wheeze during encounter  CV:  Palpation and auscultation of heart done , regular rate and rhythm, no murmur, rub, or gallop, no edema  ABDOMEN:  no guarding or rebound, bowel sounds normal, no masses  M/S:   Gait and station abnormal w/c bound and EZ stand for transfers, impaired ROM to bilateral hips and kness, bilateral kness with flexion contractures and crepitus, kyphosis  PSYCH:  oriented to person and place, memory impaired , affect and mood normal, very little insight and judgement into her situation, poor historian    Exam essentially unchanged from previous visit       Recent labs in [facility] reviewed by me today.     Assessment/Plan:  (R19.5) Loose stools  (primary encounter diagnosis)  Comment: noted previously and improved with Citrucel, has not had reports of loose stool and on increased narcotic dose, so agreeable to try without   Plan: methylcellulose (CITRUCEL) powder move to prn and monitor             (G30.1,  F02.80) Late onset Alzheimer's disease without behavioral disturbance (H)  (L28.0) Neurodermatitis  Comment: progressive, impaired language and no longer ambulatory, very poor insight into her situation anxious features at times, resistant to hygiene cares in past but none recently reported  -hydroxyzine was " tapered and d'cd in February 2023, but patient with increased itching, hydroxyzine low dose restarted and has helped relieved itching, anxiety also resolved. To note, she did not tolerate selective serotonin reuptake inhibitor's due to hyponatremia  Plan: staff assist with cares and dispense meds  -hydroxyzine low dose 12.5mg BID for anxiety and itching: multiple other modalities tried without success and this has improved her QOL.  I did discuss attempting another GDR with patient and  today and they are not interested.  Staff to update if not effective, previously was on 25mg BID       (I10) Primary hypertension  Comment: per history, meeting goal   Plan: continue valsartan       (J45.40) Moderate persistent asthma, unspecified whether complicated  Comment: per history, has had some asthma exacerbations in past, none recent   Plan: budesonide-formoterol (SYMBICORT) 160-4.5         MCG/ACT Inhaler        -continue flonase and claritin  -prn aluterol     (M15.9) Primary osteoarthritis involving multiple joints  Comment: to multiple joints, bone on bone knees, was recommended to also have hip replacement but poor surgical candididate  -have discussed risk vs benefit of chronic narcotics, have exhausted non-narcotic options and with extreme pain.  Patient/family aware of potential risks and desire low dose narcotics for QOL   Plan: continue APAP, voltaren and icy hot  -oxycodone 7.5mg po QAM prior to care and getting patient out of med  -monitor bowels, as noted in HPI, history of more loose stools in the past     (J30.1) Allergic rhinitis due to pollen, unspecified seasonality  Comment: per history, improved with Flonase  Plan: continue Flonase      MED REC REQUIRED{Post Medication Reconciliation Status:  Patient was not discharged from an inpatient facility or TCU        Electronically signed by: NADINE Rogers CNP         Sincerely,        NADINE Rogers CNP

## 2024-01-18 NOTE — LETTER
"    1/18/2024        RE: Edmundo James  [id number] [place] [address]        [facility] GERIATRICS    Chief Complaint   Patient presents with     Nursing Home Acute     HPI:  Edmundo James is a 88 year old  (1935), who is being seen today for an episodic care visit at: [facility] (Critical access hospital) [[id number]].     Patient seen today in [facility] apartment 2/2 reports two day h/o worsening cough and congestion, nausea and decreased appetite. Covid 19 and influenza rapid tests negative. Prn albuterol nebs help somewhat.    Vs, weights, NN reviewed in facility EMR.    Patient found seated in w/c in apartment.  Kenny also present. Kenny reports patient was up coughing last night and ate \"very little\" for breakfast. Patient alert, calm, but actively coughing during visit. Reports mild SOB but increased congested cough. Denies nausea today. Denies noting fever or chills. Discussed isolation protocol and testing for RSV with both today. Also discussed getting CXR 2/2 underlying asthma and concern for PNA.       Allergies, and PMH/PSH reviewed in EPIC today.  REVIEW OF SYSTEMS:  Limited secondary to cognitive impairment but today pt reports as above    Objective:   BP 98/68   Pulse 84   Temp 97.7  F (36.5  C)   Resp 18   Ht 1.6 m (5' 3\")   Wt 68 kg (150 lb)   SpO2 93%   BMI 26.57 kg/m      Resp: Effort WNL, LS decreased bilaterally and with coarse crackles and wheezing upper airways  CV: VS as abpve- no edema noted  Abd- soft, nontender, BS +  Musc- ANDREA- w/c, station WNL  Psych- alert, calm, pleasant        Assessment/Plan:     RSV bronchiolitis  Moderate persistent asthma with acute exacerbation  - Ordered swab for viral resp panel- obtained by nursing and resulted later in day + for RSV.  - CXR 2 views - obtained, reviewed and negative for acute pathology  - prednisone 20 mg daily x 5 days ordered  - albuterol nebs BID x 5 days and continue prn  - continue on Symbicort inhaler  - " add guiafenesin 100 mg/5ml give 15 ml TID x 7 days  - monitor VS, resp status closely  - Contact/droplet isolation- 7 days or until s/s resolve- whichever is longer        Orders:  Written on unit and discussed with nursing, patient and her       Electronically signed by: NADINE Quezada CNP         Sincerely,        NADINE Quezada CNP

## 2024-01-18 NOTE — PROGRESS NOTES
"[facility] GERIATRICS    Chief Complaint   Patient presents with    Nursing Home Acute     HPI:  Edmundo James is a 88 year old  (1935), who is being seen today for an episodic care visit at: [facility] ([facility]) [[id number]].     Patient seen today in [facility] apartment 2/2 reports two day h/o worsening cough and congestion, nausea and decreased appetite. Covid 19 and influenza rapid tests negative. Prn albuterol nebs help somewhat.    Vs, weights, NN reviewed in facility EMR.    Patient found seated in w/c in apartment.  Kenny also present. Kenny reports patient was up coughing last night and ate \"very little\" for breakfast. Patient alert, calm, but actively coughing during visit. Reports mild SOB but increased congested cough. Denies nausea today. Denies noting fever or chills. Discussed isolation protocol and testing for RSV with both today. Also discussed getting CXR 2/2 underlying asthma and concern for PNA.       Allergies, and PMH/PSH reviewed in EPIC today.  REVIEW OF SYSTEMS:  Limited secondary to cognitive impairment but today pt reports as above    Objective:   BP 98/68   Pulse 84   Temp 97.7  F (36.5  C)   Resp 18   Ht 1.6 m (5' 3\")   Wt 68 kg (150 lb)   SpO2 93%   BMI 26.57 kg/m      Resp: Effort WNL, LS decreased bilaterally and with coarse crackles and wheezing upper airways  CV: VS as abpve- no edema noted  Abd- soft, nontender, BS +  Musc- ANDREA- w/c, station WNL  Psych- alert, calm, pleasant        Assessment/Plan:     RSV bronchiolitis  Moderate persistent asthma with acute exacerbation  - Ordered swab for viral resp panel- obtained by nursing and resulted later in day + for RSV.  - CXR 2 views - obtained, reviewed and negative for acute pathology  - prednisone 20 mg daily x 5 days ordered  - albuterol nebs BID x 5 days and continue prn  - continue on Symbicort inhaler  - add guiafenesin 100 mg/5ml give 15 ml TID x 7 days  - monitor VS, resp status closely  - " Contact/droplet isolation- 7 days or until s/s resolve- whichever is longer        Orders:  Written on unit and discussed with nursing, patient and her       Electronically signed by: NADINE Quezada CNP

## 2024-01-22 PROBLEM — J96.01 ACUTE HYPOXIC RESPIRATORY FAILURE (H): Status: ACTIVE | Noted: 2024-01-01

## 2024-01-22 PROBLEM — J20.5 ACUTE BRONCHITIS DUE TO RESPIRATORY SYNCYTIAL VIRUS (RSV): Status: ACTIVE | Noted: 2024-01-01

## 2024-01-22 PROBLEM — J45.901 EXACERBATION OF ASTHMA, UNSPECIFIED ASTHMA SEVERITY, UNSPECIFIED WHETHER PERSISTENT: Status: ACTIVE | Noted: 2024-01-01

## 2024-01-22 NOTE — LETTER
"    1/22/2024        RE: Edmundo James  [address] [place] [address]        [facility] GERIATRICS    Chief Complaint   Patient presents with     Nursing Home Acute     HPI:  Edmundo James is a 88 year old  (1935), who is being seen today for an episodic care visit at: [facility] ([facility]) [[id number]].     Patient seen today in [facility] apartment for recheck of the following acute medical issues:    1. RSV bronchiolitis    2. Moderate persistent asthma with acute exacerbation       Patient recently developed worsening cough and congestion and diagnosed with RSV and asthma exacerbation 1/18. CXR negative for acute pathology. Started on scheduled albuterol nebs and guaifenesin and Prednisone burst.     Nursing reports no new concerns.    VS, weights, NN reviewed in facility EMR today.    Patient found in [facility] apartment seated in w/c.  Kenny also present. Alert, calm, NAD. Both tell me they are uncertain if patient's symptoms are any better. Currently patient denies SOB at rest. Reports is still coughing quite a bit. Both tell me nursing has been administering the nebs regularly. Patient denies noting fever or chills and reports is eating, denies n/v or abdominal pain.     Allergies, and PMH/PSH reviewed in EPIC today.  REVIEW OF SYSTEMS:  Limited secondary to cognitive impairment but today pt reports as above,  Kenny helps fill in details    Objective:   BP 98/68   Pulse 84   Temp 97.7  F (36.5  C)   Resp 18   Ht 1.6 m (5' 3\")   Wt 68 kg (150 lb)   SpO2 93%   BMI 26.57 kg/m      Resp: Effort WNL, LS decreased throughout, less wheezing on auscultation but tight/wheezy cough noted during visit, RA  CV: VS as above, no edema noted  Abd- soft, nontender, BS +  Musc- ANDREA- seated in w/c, station WNL  Psych- alert, calm, pleasant          Assessment/Plan:     RSV bronchiolitis  Moderate persistent asthma with acute exacerbation  - continue supportive cares and " treatments  - continue prednisone burst 20 mg daily x 5 days with monitoring for increased confusion, delirium  - continue scheduled albuterol nebs and guaifenesin course  - continue regular Symbicort  - monitor resp status, VS. Nsg report worsening resp s/s, fever or new confusion to provider    Discussed with patient/ and nursing        Electronically signed by: NADINE Quezada CNP          Sincerely,        NADINE Quezada CNP

## 2024-01-22 NOTE — PROGRESS NOTES
"[facility] GERIATRICS    Chief Complaint   Patient presents with    Nursing Home Acute     HPI:  Edmundo James is a 88 year old  (1935), who is being seen today for an episodic care visit at: [facility] ([facility]) [[id number]].     Patient seen today in [facility] apartment for recheck of the following acute medical issues:    1. RSV bronchiolitis    2. Moderate persistent asthma with acute exacerbation       Patient recently developed worsening cough and congestion and diagnosed with RSV and asthma exacerbation 1/18. CXR negative for acute pathology. Started on scheduled albuterol nebs and guaifenesin and Prednisone burst.     Nursing reports no new concerns.    VS, weights, NN reviewed in facility EMR today.    Patient found in [facility] apartment seated in w/c.  Kenny also present. Alert, calm, NAD. Both tell me they are uncertain if patient's symptoms are any better. Currently patient denies SOB at rest. Reports is still coughing quite a bit. Both tell me nursing has been administering the nebs regularly. Patient denies noting fever or chills and reports is eating, denies n/v or abdominal pain.     Allergies, and PMH/PSH reviewed in EPIC today.  REVIEW OF SYSTEMS:  Limited secondary to cognitive impairment but today pt reports as above,  Kenny helps fill in details    Objective:   BP 98/68   Pulse 84   Temp 97.7  F (36.5  C)   Resp 18   Ht 1.6 m (5' 3\")   Wt 68 kg (150 lb)   SpO2 93%   BMI 26.57 kg/m      Resp: Effort WNL, LS decreased throughout, less wheezing on auscultation but tight/wheezy cough noted during visit, RA  CV: VS as above, no edema noted  Abd- soft, nontender, BS +  Musc- ANDREA- seated in w/c, station WNL  Psych- alert, calm, pleasant          Assessment/Plan:     RSV bronchiolitis  Moderate persistent asthma with acute exacerbation  - continue supportive cares and treatments  - continue prednisone burst 20 mg daily x 5 days with monitoring for increased confusion, " delirium  - continue scheduled albuterol nebs and guaifenesin course  - continue regular Symbicort  - monitor resp status, VS. Nsg report worsening resp s/s, fever or new confusion to provider    Discussed with patient/ and nursing        Electronically signed by: NADINE Quezada CNP

## 2024-01-23 NOTE — PROGRESS NOTES
[facility]    Medicine Progress Note - Hospitalist Service    Date of Admission:  1/22/2024    Assessment & Plan   Edmundo James is a 88 year old female past medical history significant for asthma, hypertension, hyperlipidemia, dementia?  Who presented to the ED ER with complaints of difficulty breathing and was found to have RSV infection as well as asthma exacerbation.     Acute RSV infection.  Acute hypoxemic failure.  Acute on chronic asthma exacerbation.  -Dramatic improvement in oxygen requirement from 10 L/min upon presentation to down to 1 L/min now  -Continue DuoNebs 4 times daily  -Continue prednisone 40 mg daily, anticipate total 5-day course  -No concern for impending respiratory failure hence would hold off magnesium sulfate  -Continue home Breo Ellipta 1 puff daily-continue home loratadine 10 mg oral daily    Troponin elevation.  Suspect demand supply due to current RSV infection and asthma exacerbation.  No chest pain.   -Trend troponin until peak     possible underlying dementia  Patient was pleasant, awake, alert.  She was oriented to self only.  Unable to answer question appropriately or provide meaningful history.  Son present at bedside states that this is her baseline and she has some good days and bad days.      -At risk for delirium, delirium precautions  -Monitor clinically    Mild hyponatremia-resolved  -Monitor sodium    Leukopenia  -Unclear clinical significance at this time.  Monitor CBC daily    Observation Goals: -diagnostic tests and consults completed and resulted, -vital signs normal or at patient baseline, -tolerating oral intake to maintain hydration, -adequate pain control on oral analgesics, -dyspnea improved and O2 sats greater than 88% on room air or prior home oxygen levels, -returns to baseline functional status, -safe disposition plan has been identifieda, Nurse to notify provider when observation goals have been met and patient is ready for  "discharge.  Diet: Regular Diet Adult    DVT Prophylaxis: Enoxaparin (Lovenox) SQ  Rodney Catheter: Not present  Lines: None     Cardiac Monitoring: None  Code Status: Full Code      Clinically Significant Risk Factors Present on Admission        # Hypokalemia: Lowest K = 3.1 mmol/L in last 2 days, will replace as needed           # Hypertension: Noted on problem list   # Dementia: noted on problem list    # Overweight: Estimated body mass index is 26.57 kg/m  as calculated from the following:    Height as of an earlier encounter on 1/22/24: 1.6 m (5' 3\").    Weight as of an earlier encounter on 1/22/24: 68 kg (150 lb).       # Asthma: noted on problem list        Disposition Plan      Expected Discharge Date: 01/23/2024                    Paris Carrillo MD  Hospitalist Service  [facility]  Securely message with [employer] (more info)  Text page via [employer] Paging/Directory   ______________________________________________________________________    Interval History   No acute events overnight.  Complains of congestion and ongoing cough.  Is pleasantly confused.    4 Point review of system is otherwise negative    Physical Exam   Vital Signs: Temp: 98.5  F (36.9  C) Temp src: Oral BP: (!) 167/94 Pulse: 100   Resp: 22 SpO2: 96 % O2 Device: Nasal cannula Oxygen Delivery: 1 LPM  Weight: 0 lbs 0 oz    Constitutional: awake, alert, cooperative, no apparent distress, and appears stated age  Eyes: Anicteric sclera  ENT: normocepalic, without obvious abnormality  Respiratory: Coughs when tries to speak, diffuse wheezing, but increased work of breathing  Cardiovascular: Tachycardic, regular rhythm, no murmur  GI: Soft, nontender, nondistended  Skin: Skin appears within normal limit  Musculoskeletal: no lower extremity pitting edema present  Neurologic: Awake, alert, oriented to self only, follows commands, no focal deficit neuropsychiatric: Pleasantly confused    Medical Decision Making       45 MINUTES SPENT BY ME " on the date of service doing chart review, history, exam, documentation & further activities per the note.        Data   ------------------------- PAST 24 HR DATA REVIEWED -----------------------------------------------

## 2024-01-23 NOTE — PROGRESS NOTES
[facility]    ED Boarding Nurse Handoff Addendum Report:    Date/time: 1/23/2024, 8:31 AM    Activity Level: in bed and lift    Fall Risk: Yes:  nonskid shoes/slippers when out of bed, arm band in place, patient and family education, assistive device/personal items within reach, and activity supervised    Active Infusions: Lt PIV SL    Current Meds Due: see MAR    Current care needs: SW, PT, OT. Nebs, supplemental oxygen, trend trops    Oxygen requirements (liters/min and/or FiO2): 1L     Respiratory status: Nasal cannula    Vital signs (within last 30 minutes):    Vitals:    01/22/24 2230 01/22/24 2300 01/22/24 2330 01/23/24 0820   BP: (!) 140/97 (!) 155/109 (!) 163/97 (!) 124/92   BP Location:    Right arm   Pulse: 92 98 101 86   Resp:    15   Temp:    98.2  F (36.8  C)   TempSrc:    Oral   SpO2: 94% 93% 94% 92%       Focused assessment within last 30 minutes:    Alert, oriented to self. Confused at baseline. W/c bound for last year per patients son. Intermittent congested cough, course crackles and expiratory wheezes throughout bilateral lungs. Nebs given. Tylenol given at 1000 for mild chronic right hip & knee pain. Patient requires frequent redirection and reminder not to remove lines/tubes. Frequently removes nasal cannula. Increased troponin, EKG done, provider aware, continue trend trops. Patient denies chest pain.     ED Boarding Nurse name: Nova Balbuena RN

## 2024-01-23 NOTE — PROGRESS NOTES
[facility]    ED Boarding Nurse Handoff Addendum Report:    Date/time: 1/23/2024, 6:44 AM    Activity Level: in bed    Fall Risk: Yes:  nonskid shoes/slippers when out of bed    Active Infusions: NS @ 100ml//hr     Current Meds Due: Morning meds     Current care needs: On oxygen     Oxygen requirements (liters/min and/or FiO2): 2L Nasal cannula     Respiratory status: Nasal cannula    Vital signs (within last 30 minutes):    Vitals:    01/22/24 2200 01/22/24 2230 01/22/24 2300 01/22/24 2330   BP: (!) 157/101 (!) 140/97 (!) 155/109 (!) 163/97   Pulse: 100 92 98 101   Resp:       Temp:       TempSrc:       SpO2: 91% 94% 93% 94%       Focused assessment within last 30 minutes:    Pt complained of some hip pain resolved with repositioning. New IV placed.     ED Boarding Nurse name: Taniya Conner RN         RECEIVING UNIT ED HANDOFF REVIEW    Above ED Nurse Handoff Report was reviewed: Yes  Reviewed by: Aixa Atkins RN on January 23, 2024 at 12:58 PM

## 2024-01-23 NOTE — PHARMACY-ADMISSION MEDICATION HISTORY
Pharmacist Admission Medication History    Admission medication history is complete. The information provided in this note is only as accurate as the sources available at the time of the update.    Information Source(s): Facility ([facility] medication list/MAR ([facility], [facility]) and CareEverywhere/SureScripts via  fax    Changes made to PTA medication list:  Added: albuterol nebs, guaifenesin  Deleted: None  Changed: None    Allergies reviewed with patient and updates made in EHR: yes    Medication History Completed By: Marlyn Woods, Lynette 1/23/2024 9:00 AM    Prior to Admission medications    Medication Sig Last Dose Taking? Auth Provider Long Term End Date   acetaminophen (TYLENOL) 500 MG tablet TAKE 2 TABLETS (1G) BY MOUTH TWICE DAILY;AND MAY TAKE TWO TABLETS (1GM) ONCE DAILY AS NEEDED 1/22/2024 at pm Yes Dolores Babb APRN CNP     albuterol (PROAIR HFA/PROVENTIL HFA/VENTOLIN HFA) 108 (90 Base) MCG/ACT inhaler Inhale 2 puffs into the lungs every 4 hours as needed for shortness of breath / dyspnea or wheezing Unknown Yes Dolores Babb APRN CNP Yes    albuterol (PROVENTIL) (2.5 MG/3ML) 0.083% neb solution Take 1 vial by nebulization 2 times daily For 7 days 1/22/2024 at pm Yes Unknown, Entered By History Yes 1/24/24   CALMOSEPTINE 0.44-20.6 % OINT ointment APPLY TOPICALLY TO BUTTOCKS TWICE DAILY;& FOUR TIMES A DAY AS NEEDED 1/22/2024 at pm Yes Dolores Babb APRN CNP     camphor-menthol (ANTI-ITCH) 0.5-0.5 % external lotion APPLY A THIN LAYER TOPICALLY TO ITCHY AREAS (BACK, ARMS, NECK) THREE TIMES DAILY 1/22/2024 at pm Yes Dolores Babb APRN CNP     diclofenac (VOLTAREN) 1 % topical gel APPLY 2GM TOPICALLY TO RIGHT HIP TWICE DAILY 1/22/2024 at pm Yes Dolores Babb APRN CNP     fluticasone (FLONASE) 50 MCG/ACT nasal spray SPRAY 2 SPRAYS IN EACH NOSTRIL DAILY 1/22/2024 at am Yes Daria Jefferson APRN CNP     guaiFENesin (ROBITUSSIN) 20 mg/mL  liquid Take 15 mLs by mouth 3 times daily For 7 days at 0800, 1400, and 2000 1/22/2024 at 1400 Yes Unknown, Entered By History  1/25/24   hydrOXYzine (ATARAX) 25 MG tablet TAKE ONE-HALF TABLET (12.5MG) BY MOUTH TWICE DAILY 1/22/2024 at pm Yes Marina Shepherd APRN CNP     ICY HOT MAX LIDOCAINE 4-1 % CREA APPLY 1GM TOPICALLY TO AFFECTED AREA(S) TWICE DAILY 1/22/2024 at pm Yes Dolores Babb APRN CNP Yes    loratadine (CLARITIN) 10 MG tablet TAKE 1 TABLET BY MOUTH ONCE DAILY 1/22/2024 at am Yes Dolores Babb APRN CNP     methylcellulose (CITRUCEL) powder Take 1 tablespoons every day prn loose stools Unknown Yes Dolores Babb APRN CNP     NYSTOP 090929 UNIT/GM powder APPLY TOPICALLY TO AFFECTED AREA(S) TWICE DAILY;& TWICE DAILY AS NEEDED 1/22/2024 Yes Dolores Babb APRN CNP     oxyCODONE (ROXICODONE) 5 MG tablet Take 7.5 mg by mouth every morning And take an additional 0.5 tablets (2.5 mg) once daily as needed for pain. 1/22/2024 at 0600 Yes Unknown, Entered By History     predniSONE (DELTASONE) 20 MG tablet Take 1 tablet (20 mg) by mouth daily for 5 days 1/22/2024 at am Yes Daria Jefferson APRN CNP  1/24/24   SENNA-docusate sodium (SENNA S) 8.6-50 MG tablet Take 1 tablet by mouth daily as needed (if no BM previous day) Unknown Yes Dolores Babb APRN CNP     SYMBICORT 160-4.5 MCG/ACT Inhaler INHALE 2 PUFFS INTO THE LUNGS TWICE DAILY 1/22/2024 at pm Yes Dolores Babb APRN CNP Yes    valsartan (DIOVAN) 40 MG tablet TAKE ONE AND ONE-HALF TABLETS (60MG) BY MOUTH ONCE DAILY 1/22/2024 at am Yes Dolores Babb APRN CNP Yes    VITAMIN D3 50 MCG (2000 UT) tablet TAKE 1 TABLET BY MOUTH ONCE DAILY 1/22/2024 at am Yes Marina Shepherd APRN CNP

## 2024-01-23 NOTE — H&P
"[facility]    History and Physical - Hospitalist Service       Date of Admission:  1/22/2024    Assessment & Plan      Edmundo James is a 88 year old female admitted on 1/22/2024. She presented to the emergency department with hypoxemia, bronchospasm, laborious breathing.  She has been recently diagnosed with RSV infection, it has caused her asthma exacerbation.  She has been improved with nebulizations in the emergency department but she needs oxygen to maintain her saturation.  She is admitted for further treatment and follow-up.    Acute RSV infection.  Acute hypoxemic failure.  Acute on chronic asthma exacerbation.    Admit to observation.  Regular diet.  Oxygen therapy to keep saturation 92% of IV.  Hydration with normal saline until a.m.  DuoNebs every 6 hours.  Albuterol scale every 6 hours.  Albuterol rescue doses every 2 hours as needed.  Prednisone 40 mg daily.    Hyperlipidemia.  Statin not listed.    Hypertension.  On valsartan.    Troponin elevation.  Suspect demand supply due to current asthma exacerbation.  Complete serial troponin.     Mild hyponatremia.  Should be corrected after normal saline overnight.    Hiatal hernia.  Famotidine IV twice daily.     Diet:  Regular diet  DVT Prophylaxis: Pneumatic Compression Devices  Rodney Catheter: Not present  Lines: None     Cardiac Monitoring: None  Code Status:  Full code    Clinically Significant Risk Factors Present on Admission                   # Hypertension: Noted on problem list   # Dementia: noted on problem list    # Overweight: Estimated body mass index is 26.57 kg/m  as calculated from the following:    Height as of an earlier encounter on 1/22/24: 1.6 m (5' 3\").    Weight as of an earlier encounter on 1/22/24: 68 kg (150 lb).              Disposition Plan   Expected discharge in the next 48 hours.       Baljinder Malin MD  Hospitalist Service  [facility]  Securely message with [employer] (more " "info)  Text page via [facility] Paging/Directory     ______________________________________________________________________    Chief Complaint   Shortness of breath, hypoxia.    History is obtained from the patient, electronic health record, and emergency department physician    History of Present Illness   Edmundo James is a 88 year old female who has a history of asthma.  3 days ago she was diagnosed with RSV infection.  She has been treated outpatient with symptomatic treatment but her respiratory symptoms had worsened significantly in the last 24 hours.  The patient presented to the emergency department with hypoxemia, bronchospasm, laborious breathing.  She has been treated with nebulizations,  Oxygen therapy and she is having some improvement however not in condition to go back to her current living place.  Emergency department physician requested admission for further treatment and follow-up.  Vital signs:  Temp: 98.2  F (36.8  C) Temp src: Oral BP: (!) 140/97 Pulse: 92   Resp: 24 SpO2: 94 % O2 Device: Nasal cannula Oxygen Delivery: 2 LPM      Estimated body mass index is 26.57 kg/m  as calculated from the following:    Height as of an earlier encounter on 1/22/24: 1.6 m (5' 3\").    Weight as of an earlier encounter on 1/22/24: 68 kg (150 lb).  Lab workup:  CBC within the normal limit, absolute lymphocyte count 0.7.  Chemistry with sodium 134, chloride 97, glycemia 148, NT-proBNP 2648, troponin T high-sensitivity 15.    Checks x-ray report hiatal hernia which is known from prior.  No acute pleural or pulmonary lesions.  Past Medical History    Past Medical History:   Diagnosis Date    Heart disease     HTN (hypertension)     Hyperlipidemia     OA (osteoarthritis) of knee     Uncomplicated asthma        Past Surgical History   Past Surgical History:   Procedure Laterality Date    CHOLECYSTECTOMY      LUMPECTOMY BREAST      TONSILLECTOMY         Prior to Admission Medications   Prior to Admission Medications "   Prescriptions Last Dose Informant Patient Reported? Taking?   CALMOSEPTINE 0.44-20.6 % OINT ointment   No No   Sig: APPLY TOPICALLY TO BUTTOCKS TWICE DAILY;& FOUR TIMES A DAY AS NEEDED   ICY HOT MAX LIDOCAINE 4-1 % CREA   No No   Sig: APPLY 1GM TOPICALLY TO AFFECTED AREA(S) TWICE DAILY   NYSTOP [id number] UNIT/GM powder   No No   Sig: APPLY TOPICALLY TO AFFECTED AREA(S) TWICE DAILY;& TWICE DAILY AS NEEDED   SENNA-docusate sodium (SENNA S) 8.6-50 MG tablet   No No   Sig: Take 1 tablet by mouth daily as needed (if no BM previous day)   SYMBICORT 160-4.5 MCG/ACT Inhaler   No No   Sig: INHALE 2 PUFFS INTO THE LUNGS TWICE DAILY   VITAMIN D3 50 MCG (2000 UT) tablet   No No   Sig: TAKE 1 TABLET BY MOUTH ONCE DAILY   acetaminophen (TYLENOL) 500 MG tablet   No No   Sig: TAKE 2 TABLETS (1G) BY MOUTH TWICE DAILY;AND MAY TAKE TWO TABLETS (1GM) ONCE DAILY AS NEEDED   albuterol (PROAIR HFA/PROVENTIL HFA/VENTOLIN HFA) 108 (90 Base) MCG/ACT inhaler   No No   Sig: Inhale 2 puffs into the lungs every 4 hours as needed for shortness of breath / dyspnea or wheezing   camphor-menthol (ANTI-ITCH) 0.5-0.5 % external lotion   No No   Sig: APPLY A THIN LAYER TOPICALLY TO ITCHY AREAS (BACK, ARMS, NECK) THREE TIMES DAILY   diclofenac (VOLTAREN) 1 % topical gel   No No   Sig: APPLY 2GM TOPICALLY TO RIGHT HIP TWICE DAILY   fluticasone (FLONASE) 50 MCG/ACT nasal spray   No No   Sig: SPRAY 2 SPRAYS IN EACH NOSTRIL DAILY   hydrOXYzine (ATARAX) 25 MG tablet   No No   Sig: TAKE ONE-HALF TABLET (12.5MG) BY MOUTH TWICE DAILY   loratadine (CLARITIN) 10 MG tablet   No No   Sig: TAKE 1 TABLET BY MOUTH ONCE DAILY   methylcellulose (CITRUCEL) powder   No No   Sig: Take 1 tablespoons every day prn loose stools   oxyCODONE (ROXICODONE) 5 MG tablet   No No   Sig: TAKE ONE TABLET (7.5MG) BY MOUTH DAILY BEFORE BREAKFAST;& MAY ALSO TAKE ONE-HALF TABLET (2.5MG) DAILY AS NEEDED FOR PAIN   **CONTROLLED SUBSTANCE-DOUBLE LOCK STORAGE**   predniSONE (DELTASONE) 20 MG  tablet   No No   Sig: Take 1 tablet (20 mg) by mouth daily for 5 days   valsartan (DIOVAN) 40 MG tablet   No No   Sig: TAKE ONE AND ONE-HALF TABLETS (60MG) BY MOUTH ONCE DAILY      Facility-Administered Medications: None        Review of Systems    The 10 point Review of Systems is negative other than noted in the HPI or here.       Physical Exam   Vital Signs: Temp: 98.2  F (36.8  C) Temp src: Oral BP: (!) 140/97 Pulse: 92   Resp: 24 SpO2: 94 % O2 Device: Nasal cannula Oxygen Delivery: 2 LPM  Weight: 0 lbs 0 oz    Constitutional: awake, alert, cooperative, no apparent distress, and appears stated age  Respiratory: Moderate respiratory distress, decreased air exchange, Mild retraction, and rhonchi diffuse, wheeze diffuse  Cardiovascular: Normal apical impulse, regular rate and rhythm, normal S1 and S2, no S3 or S4, and no murmur noted    Medical Decision Making        75 MINUTES SPENT BY ME on the date of service doing chart review, history, exam, documentation & further activities per the note.      Data     I have personally reviewed the following data over the past 24 hrs:    5.1  \   13.9   / 306     134 (L) 97 (L) 17.8 /  148 (H)   4.0 23 0.54 \     Trop: 15 (H) BNP: 2,648 (H)     INR:  N/A PTT:  N/A   D-dimer:  0.50 Fibrinogen:  N/A       Imaging results reviewed over the past 24 hrs:   Recent Results (from the past 24 hour(s))   XR Chest 2 Views    Narrative    EXAM: XR CHEST 2 VIEWS  LOCATION: [facility]  DATE: 1/22/2024    INDICATION: Shortness of breath, rsv recently, worsening dyspnea  COMPARISON: None.      Impression    IMPRESSION: Cardiac silhouette is at the upper limit of normal size given portable technique. Very large hiatal hernia with majority of the stomach and possible other structures in the chest. Given this limitation, there is no definite airspace   consolidation, pleural effusion or pneumothorax. No acute bony abnormality.

## 2024-01-23 NOTE — PROGRESS NOTES
Physical Therapy: Orders received. Chart reviewed and discussed with care team.? Physical Therapy not indicated due to patient is unable to weight bear and is lift dependent at [facility] at baseline. No acute PT needs identified.? Defer discharge recommendations to medical team.? Will complete orders.

## 2024-01-23 NOTE — ED TRIAGE NOTES
Patient with history of asthma presents with increased SOB and positive for RSV per assisted living facility. Received solumedrol 125 mg IV and duoneb updraft per EMS

## 2024-01-23 NOTE — PROGRESS NOTES
Phone update provided to daughter Kandice regarding patients current condition, treatment, and observation status. Patient continues to ask about her glasses, Kandice states her glasses are in her apartment. Patients son coming later in morning to visit.

## 2024-01-23 NOTE — PROGRESS NOTES
"formerly [facility]     Date: 1/23/24  Admission Dx: Asthma Exacerbation  Pulmonary History: Asthma hx  Home Nebulizer/MDI Use: Albuterol MDI Q4 prn, Albuterol neb BID, Symbicort BID  Home Oxygen: None  Acuity Level (RCAT flow sheet): 3  Aerosol Therapy initiated: Duoneb QID, Albuterol Q2 prn, Breo QD      Pulmonary Hygiene initiated: Coughing techniques      Volume Expansion initiated: Incentive Spirometry      Current Oxygen Requirements: 1 LPM NC  Current SpO2: 93%    Re-evaluation date: 1/26/24    Patient Education: Discussed use and benefits of respiratory medications.       See \"RT Assessments\" flow sheet for patient assessment scoring and Acuity Level Details.           "

## 2024-01-23 NOTE — PROGRESS NOTES
ROOM # 201    Living Situation (if not independent, order SW consult):  Facility name:  : son: Zoltan    Activity level at baseline: intermittent confusion(has good days and bad days as per son)  Activity level on admit: confusion    Who will be transporting you at discharge: TBT    Patient registered to observation; given Patient Bill of Rights; given the opportunity to ask questions about observation status and their plan of care.  Patient has been oriented to the observation room, bathroom and call light is in place.    Discussed discharge goals and expectations with patient/family.       VSS, afeb upon arrival to floor.12 lead EKG completed on floor this afternoon. On droplet precautions for RSV.Also has asthma exacerbation.Very congested cough,lungs somewhat coarse with ins.and exp.wheezes noted alireza. posteriorly.O2 @ 1 L NC, sats 94%.Admit questions answered by son.Pt. resting in bed, son @ her side.Will con't to  monitor closely,will alert staff as needed.

## 2024-01-23 NOTE — ED NOTES
[facility]  ED Nurse Handoff Report    ED Chief complaint: Shortness of Breath  . ED Diagnosis:   Final diagnoses:   Acute hypoxic respiratory failure (H)   Acute bronchitis due to respiratory syncytial virus (RSV)   Exacerbation of asthma, unspecified asthma severity, unspecified whether persistent       Allergies:   Allergies   Allergen Reactions    Cats Other (See Comments)     Runny nose, watery eyes     Dogs Other (See Comments)     Runny nose and watery eyes     Mold Other (See Comments)     Runny nose, watery eyes        Code Status: POLST form     Activity level - Baseline/Home:  lift.  Activity Level - Current:   in bed.   Lift room needed: No.   Bariatric: No   Needed: No   Isolation: Yes.   Infection: RSV.     Respiratory status: Nasal cannula    Vital Signs (within 30 minutes):   Vitals:    01/22/24 2045 01/22/24 2112 01/22/24 2145 01/22/24 2230   BP:   (!) 165/104 (!) 140/97   Pulse: 114 106 106 92   Resp: 24      Temp:       TempSrc:       SpO2: 98% 92% 90% 94%       Cardiac Rhythm:  ,      Pain level:    Patient confused: Yes.   Patient Falls Risk: nonskid shoes/slippers when out of bed, arm band in place, patient and family education, room door open, and toileting schedule implemented.   Elimination Status: Has voided     Patient Report - Initial Complaint: SOB.   Focused Assessment: RSV bronchitis, asthma exacerbation, hypoxic respiratory failure     Abnormal Results:   Labs Ordered and Resulted from Time of ED Arrival to Time of ED Departure   BASIC METABOLIC PANEL - Abnormal       Result Value    Sodium 134 (*)     Potassium 4.0      Chloride 97 (*)     Carbon Dioxide (CO2) 23      Anion Gap 14      Urea Nitrogen 17.8      Creatinine 0.54      GFR Estimate 89      Calcium 9.2      Glucose 148 (*)    TROPONIN T, HIGH SENSITIVITY - Abnormal    Troponin T, High Sensitivity 15 (*)    CBC WITH PLATELETS AND DIFFERENTIAL - Abnormal    WBC Count 5.1      RBC Count 4.98       Hemoglobin 13.9      Hematocrit 42.6      MCV 86      MCH 27.9      MCHC 32.6      RDW 14.0      Platelet Count 306      % Neutrophils 84      % Lymphocytes 13      % Monocytes 3      % Eosinophils 0      % Basophils 0      % Immature Granulocytes 0      NRBCs per 100 WBC 0      Absolute Neutrophils 4.3      Absolute Lymphocytes 0.7 (*)     Absolute Monocytes 0.2      Absolute Eosinophils 0.0      Absolute Basophils 0.0      Absolute Immature Granulocytes 0.0      Absolute NRBCs 0.0     BLOOD GAS VENOUS - Abnormal    pH Venous 7.43      pCO2 Venous 38 (*)     pO2 Venous 116 (*)     Bicarbonate Venous 25      Base Excess/Deficit Venous 0.9      FIO2 2      Oxyhemoglobin Venous 98 (*)     O2 Sat, Venous 98.5 (*)    NT PROBNP INPATIENT - Abnormal    N terminal Pro BNP Inpatient 2,648 (*)    INFLUENZA A/B, RSV, & SARS-COV2 PCR - Abnormal    Influenza A PCR Negative      Influenza B PCR Negative      RSV PCR Positive (*)     SARS CoV2 PCR Negative     D DIMER QUANTITATIVE - Normal    D-Dimer Quantitative 0.50          XR Chest 2 Views   Final Result   IMPRESSION: Cardiac silhouette is at the upper limit of normal size given portable technique. Very large hiatal hernia with majority of the stomach and possible other structures in the chest. Given this limitation, there is no definite airspace    consolidation, pleural effusion or pneumothorax. No acute bony abnormality.          Treatments provided: UD, fluids, oxygen  Family Comments: daughter available; Kandice [phone]  OBS brochure/video discussed/provided to patient:  Yes  ED Medications:   Medications   sodium chloride 0.9% BOLUS 1,000 mL (0 mLs Intravenous Stopped 1/22/24 2200)   ipratropium - albuterol 0.5 mg/2.5 mg/3 mL (DUONEB) neb solution 3 mL (3 mLs Nebulization $Given 1/22/24 2207)       Drips infusing:  No  For the majority of the shift this patient was Green.   Interventions performed were .    Sepsis treatment initiated:  No    Cares/treatment/interventions/medications to be completed following ED care:     ED Nurse Name: Leslie Sherman RN  11:22 PM

## 2024-01-23 NOTE — ED NOTES
Lungs clear following UD. Patient expresses desire to return home but agreeable to stay with daughter's encouragement. Explained need to stay; low O2 sats, need for repeat UD tx.  Patient has dementia and requires frequent reminders of why she is here.   Assisted to reposition to comfort. Declines purewick. Daughter reports pt cannot bear weight  and requires a mechanical lift to get OOB

## 2024-01-23 NOTE — ED PROVIDER NOTES
History     Chief Complaint:  Shortness of Breath       The history is provided by the patient and a relative.      Edmundo James is a 88 year old female with history of asthma and hypertension who presents to the ED for shortness of breath. The patient reports that she is experiencing shortness of breath and loss of appetite. Daughter states that the patient was diagnosed with RSV last week. She states that the  of the patient notes the patient has increase of shortness of breath while laying down before the RSV infection. She adds that the  of the patient also reported the patient experiencing an increase of cough today. Daughter notes the patient is unable to transfer on her own. She confirms that she has inhalers at home. She denies fever, chills, body aches, diarrhea, vomiting, chest pain, leg swelling, or antibiotic use. She endorses that she feels better after the breathing treatment she received during her care today.     Independent Historian:   Daughter - They report supplemental history.     Review of External Notes:          Medications:    Albuterol inhaler  Fluticasone  Hydroxyzine  Loratadine  Methylcellulose  Nystop  Oxycodone  Prednisone  Senna-docusate sodium  Symbicort Inhaler  Valsartan  Vitamin D3     Past Medical History:    Heart disease  HTN (hypertension)  Hyperlipidemia  OA (osteoarthritis) of knee  Uncomplicated asthma  Accidental drug overdose, initial encounter  Hypotension, unspecified hypotension type  Late onset Alzheimer's disease without behavioral disturbance   Moderate episode of recurrent major depressive disorder     Past Surgical History:    Cholecystectomy    Lumpectomy breast  Tonsillectomy     Physical Exam   Patient Vitals for the past 24 hrs:   BP Temp Temp src Pulse Resp SpO2   01/22/24 2112 -- -- -- 106 -- 92 %   01/22/24 2045 -- -- -- 114 24 98 %   01/22/24 2030 (!) 155/113 -- -- (!) 124 26 93 %   01/22/24 2015 (!) 116/103 -- -- (!) 134 -- 97 %    01/22/24 2013 -- 98.2  F (36.8  C) Oral (!) 135 30 97 %        Physical Exam  Constitutional: Non toxic appearing.  HEENT: Atraumatic.  Moist mucous membranes.  Neck: Soft.  Supple.  No JVD.  Cardiac: Regular rate and rhythm.  No murmur or rub.  Respiratory: Minimal expiratory wheezing diffusely.  No respiratory distress.    Abdomen: Soft and nontender.  No rebound or guarding.  Nondistended.  Musculoskeletal: No edema.  Normal range of motion.  Neurologic: Alert and oriented x3.  Normal tone and bulk.    Skin: No rashes.  No edema.  Psych: Normal affect.  Normal behavior.            Emergency Department Course   ECG  ECG results from 01/22/24   EKG 12-lead, tracing only     Value    Systolic Blood Pressure     Diastolic Blood Pressure     Ventricular Rate 115    Atrial Rate 115    VA Interval 176    QRS Duration 128        QTc 478    P Axis 28    R AXIS -53    T Axis 90    Interpretation ECG      Sinus tachycardia  Left axis deviation  Left ventricular hypertrophy with QRS widening and repolarization abnormality ( R in aVL , Judd product , Romhilt-Dunbar )  Abnormal ECG  When compared with ECG of 19-SEP-2021 09:49,  Vent. rate has increased BY  42 BPM  Questionable change in QRS duration  ECG taken at 2033, ECG read at 2130       Imaging:  XR Chest 2 Views   Final Result   IMPRESSION: Cardiac silhouette is at the upper limit of normal size given portable technique. Very large hiatal hernia with majority of the stomach and possible other structures in the chest. Given this limitation, there is no definite airspace    consolidation, pleural effusion or pneumothorax. No acute bony abnormality.             Laboratory:  Labs Ordered and Resulted from Time of ED Arrival to Time of ED Departure   BASIC METABOLIC PANEL - Abnormal       Result Value    Sodium 134 (*)     Potassium 4.0      Chloride 97 (*)     Carbon Dioxide (CO2) 23      Anion Gap 14      Urea Nitrogen 17.8      Creatinine 0.54      GFR Estimate  89      Calcium 9.2      Glucose 148 (*)    TROPONIN T, HIGH SENSITIVITY - Abnormal    Troponin T, High Sensitivity 15 (*)    CBC WITH PLATELETS AND DIFFERENTIAL - Abnormal    WBC Count 5.1      RBC Count 4.98      Hemoglobin 13.9      Hematocrit 42.6      MCV 86      MCH 27.9      MCHC 32.6      RDW 14.0      Platelet Count 306      % Neutrophils 84      % Lymphocytes 13      % Monocytes 3      % Eosinophils 0      % Basophils 0      % Immature Granulocytes 0      NRBCs per 100 WBC 0      Absolute Neutrophils 4.3      Absolute Lymphocytes 0.7 (*)     Absolute Monocytes 0.2      Absolute Eosinophils 0.0      Absolute Basophils 0.0      Absolute Immature Granulocytes 0.0      Absolute NRBCs 0.0     BLOOD GAS VENOUS - Abnormal    pH Venous 7.43      pCO2 Venous 38 (*)     pO2 Venous 116 (*)     Bicarbonate Venous 25      Base Excess/Deficit Venous 0.9      FIO2 2      Oxyhemoglobin Venous 98 (*)     O2 Sat, Venous 98.5 (*)    NT PROBNP INPATIENT - Abnormal    N terminal Pro BNP Inpatient 2,648 (*)    INFLUENZA A/B, RSV, & SARS-COV2 PCR - Abnormal    Influenza A PCR Negative      Influenza B PCR Negative      RSV PCR Positive (*)     SARS CoV2 PCR Negative     D DIMER QUANTITATIVE - Normal    D-Dimer Quantitative 0.50          Procedures       Emergency Department Course & Assessments:             Interventions:  Medications   sodium chloride 0.9% BOLUS 1,000 mL (1,000 mLs Intravenous $New Bag 1/22/24 2050)        Independent Interpretation (X-rays, CTs, rhythm strip):  None    Assessments/Consultations/Discussion of Management or Tests:  ED Course as of 01/22/24 2155 Mon Jan 22, 2024 2043 I obtained history and examined the patient as noted above.        Social Determinants of Health affecting care:   None    Disposition:  The patient was admitted to the hospital under the care of Medicine.    Impression & Plan      Medical Decision Making:  Edmundo James is an 88-year-old woman who is afebrile but hypoxic.   She does have some wheezing and underlying asthma and given nebulizers and steroids.  She is known to be RSV positive.  Chest x-ray without acute obvious infiltrate.  She has large lateral hernia that was seen on previous x-ray per report.  She continued to be hypoxic and required admission acute hypoxic respiratory failure likely secondary to RSV infection with underlying asthma contributing.  Discussed results and plan with patient and daughter and they are in agreement. Spoke with the hospitalist service who accepts for admission.      Diagnosis:    ICD-10-CM    1. Acute hypoxic respiratory failure (H)  J96.01 Oxygen Adult/Peds     predniSONE (DELTASONE) 20 MG tablet     DISCONTINUED: predniSONE (DELTASONE) 20 MG tablet      2. Acute bronchitis due to respiratory syncytial virus (RSV)  J20.5 Oxygen Adult/Peds     predniSONE (DELTASONE) 20 MG tablet     DISCONTINUED: predniSONE (DELTASONE) 20 MG tablet      3. Exacerbation of asthma, unspecified asthma severity, unspecified whether persistent  J45.901 Oxygen Adult/Peds     predniSONE (DELTASONE) 20 MG tablet     DISCONTINUED: predniSONE (DELTASONE) 20 MG tablet                Scribe Disclosure:  Elvie VARGAS, am serving as a scribe at 9:55 PM on 1/22/2024 to document services personally performed by Gabriele Gonzáles MD based on my observations and the provider's statements to me.     1/22/2024   Gabriele Gonzáles MD Salay, Nicholas J, MD  02/02/24 [id number]

## 2024-01-24 NOTE — PROGRESS NOTES
Respiratory Therapy Note    Patient noted to have increased WOB and RR (36-40 bpm) at 1530. Per MD patient started on HFNC at 1545 for PEEP support, current settings:     Flow: 35 LPM  FiO2: 30%    Skin integrity is intact. Respiratory rate 28 with mild abdominal muscle use, breath sounds coarse/diminished, and SpO2 96%. Patient will continue to receive Duoneb QID or Xopenex Q3 prn. RT will continue to monitor.     Carissa Alvarez, RT  5:37 PM January 24, 2024

## 2024-01-24 NOTE — PROGRESS NOTES
[facility]    Medicine Progress Note - Hospitalist Service    Date of Admission:  1/22/2024    Assessment & Plan   Edmundo James is a 88 year old female past medical history significant for asthma, hypertension, hyperlipidemia, dementia?  Who presented to the ED ER with complaints of difficulty breathing and was found to have RSV infection as well as asthma exacerbation.     In the ED her she was hypertensive, afebrile, tahcy in the 120s-130s, RR 26-30, O2 94% on 2L NC. BMP remarkable for glucose 148, Na 134, Cl 97. Troponin 15. BNP 2648. Negative D-Dimer. Positive RSV. Pt given Duoneb and 1L NS, which improved her sx.    Acute RSV infection.  Acute hypoxemic failure.  Acute on chronic asthma exacerbation.  -Dramatic improvement in oxygen requirement from 10 L/min upon presentation to down to 1 L/min on 1/23. Pt tolerating room air on 1/24.   -Continue DuoNebs 4 times daily.   -Continue prednisone 40 mg daily, anticipate total 5-day course (1/24/24--day 3)  -No concern for impending respiratory failure hence would hold off magnesium sulfate  -Continue home Breo Ellipta 1 puff daily-continue home loratadine 10 mg oral daily     Tachycardia  Elevated BP without prior history of known HTN   Troponin elevation.  Suspect demand supply due to current RSV infection and asthma exacerbation.  No chest pain.   -Trend troponin until peak. Initially 15 in ED and elevated to 71 on 1/23. Repeat down trending on 1/24 -61.   -Not on anti-HTNve medications at baseline.  Could be stress induced/agitation vs exacerbated by both prednisone and nebulizers/breathing tx.   Given history of asthma with treat with cardioselective BB and alpha blocker.   Start metoprolol tartrate 25 mg PO BID with hold parameters.  Will have metoprolol IV 5 mg q5 min x 3 doses for rescue.   If still elevated despite metoprolol will also have PRN hydralazine available.  Monitor.      Possible underlying dementia  Delirium  "/agitation  Patient was pleasant, awake, alert.  She was oriented to self only.  Unable to answer question appropriately or provide meaningful history.  Per family --  she has some good days and bad days.   -1/24 continues to have confusion and trying to get out of bed. Sitter in room with pt.      -At risk for delirium, delirium precautions  -Monitor clinically  -given Haldol 1/23PM for agitation. Have quetiapine and haldol available.  Add Trazodone at    HS.     Mild hyponatremia-resolved  -Monitor sodium 134 on 1/22 repeat on 1/23 was 137, and 134 again on 1/24.   -Continue to monitor.      Leukopenia-resolved  -Unclear clinical significance at this time.  Monitor CBC daily  -CBC on 1/24 WBC 9.2     Observation Goals: -diagnostic tests and consults completed and resulted, -vital signs normal or at patient baseline, -tolerating oral intake to maintain hydration, -adequate pain control on oral analgesics, -dyspnea improved and O2 sats greater than 88% on room air or prior home oxygen levels, -returns to baseline functional status, -safe disposition plan has been identifieda, Nurse to notify provider when observation goals have been met and patient is ready for discharge.  Diet: Regular Diet Adult    DVT Prophylaxis: Enoxaparin (Lovenox) SQ  Rodney Catheter: Not present  Lines: None     Cardiac Monitoring: None  Code Status: Full Code      Clinically Significant Risk Factors Present on Admission        # Hypokalemia: Lowest K = 3.1 mmol/L in last 2 days, will replace as needed           # Hypertension: Noted on problem list   # Dementia: noted on problem list    # Overweight: Estimated body mass index is 26.57 kg/m  as calculated from the following:    Height as of an earlier encounter on 1/22/24: 1.6 m (5' 3\").    Weight as of an earlier encounter on 1/22/24: 68 kg (150 lb).       # Asthma: noted on problem list        Disposition Plan  per clinical course          The patient's care was discussed with the Bedside " "Nurse, Care Coordinator/, Patient, and Patient's Family.    SOHA Metzger Ed.D, NADINE, CNP   Hospitalist Service  [facility]  Securely message with [employer] (more info)  Text page via [facility] Paging/Directory         Addendum: 1/24/2024 3:54 PM    Called by RN and RT to assess increased WOB and respiratory distress.    HR 120s-130s and BP still elevated.  Increased work of breathing.    Change Albuterol to Xopenex  Discussed with family -- DNR/DNI.  Okay for HFNC and Bipap but no intubation or further resuscitation.  Check CXR and VBG  Does not appear septic nor appears to have had a PE but still on differential.   Ddimer normal yesterday.    NADINE Woods CNP   ______________________________________________________________________    Attestation:  1/24/2024 3:21 PM   \"I was present with the student who participated in the service and in the documentation of the note. I have verified the history and personally performed the physical exam and medical decision-making. I agree with the assessment and plan of care as documented in the note.\"     As above.  BP (!) 174/107   Pulse (!) 130   Temp 97.4  F (36.3  C) (Oral)   Resp 24   SpO2 92%    Treating symptoms.    Add HTN rx   Will update family.    Overall stable.      AxOx1.  FC.  States \"have to get the children ready.\"  BBS. Lungs coarse.  Bilateral exp wheezing. No tripod.  RRR. S1S2. No S3  SNTND NABS.  No HSM  ANDREA.  CMS intact.  < 3 seconds.     MDM: As above and below.    NADINE Woods CNP    -----------------------------------------------------------------------------------------------------------------------    Interval History   Pt was confused and agitated last evening pulling off O2 and pulled out IV. Pt was given Haldol with relief. Pt continuously trying to get out of bed, wheelchair bound at baseline. Sitting present in room redirecting her. Pt on 1L NC overnight, but " weaned off this morning and sating at 94% on room air.     Physical Exam   Vital Signs: Temp: 98.2  F (36.8  C) Temp src: Oral BP: (!) 163/102 Pulse: 86   Resp: 28 SpO2: 94 % O2 Device: Nasal cannula Oxygen Delivery: 1 LPM  Weight: 0 lbs 0 oz    General: Awake, sitting up in bed. Cooperative but mildly agitated at times. NAD  Pulm: Non labored breathing. Intermittent coughing, worsened with deep inhale. Diffuse wheezing and crackles.  CV: RRR. No murmur   GI: Soft and non tender. Non distended.  MSK: moves all extremities spontaneously. No LE edema.   Neuro: Alert and oriented to self. No focal deficits.   Psych: Mildly agitated and confused       Medical Decision Making       45 MINUTES SPENT BY ME on the date of service doing chart review, history, exam, documentation & further activities per the note.      Data   ------------------------- PAST 24 HR DATA REVIEWED -----------------------------------------------    I have personally reviewed the following data over the past 24 hrs:    9.2  \   13.5   / 317     134 (L) 100 18.5 /  82   3.5 24 0.56 \     Trop: 61 (H) BNP: N/A       Imaging results reviewed over the past 24 hrs:   No results found for this or any previous visit (from the past 24 hour(s)).

## 2024-01-24 NOTE — PLAN OF CARE
PRIMARY DIAGNOSIS: ASTHMA EXACERBATION  OUTPATIENT/OBSERVATION GOALS TO BE MET BEFORE DISCHARGE:  1. Vital signs stable: Yes    2. Improvement of peak flow to greater than 70% sustained off nebulizer for 4 hours: No    3. Dyspnea improved and O2 sats >88% at RA or at prior home O2 therapy level: Yes      SpO2: 96 %, O2 Device: Nasal cannula    4. Short term supplemental O2 needed for use with activity at home: Yes, on  1L 02 nc    5. Tolerating adequate PO diet and medications: Yes    6. Return to near baseline physical activity: No    Discharge Planner Nurse   Safe discharge environment identified: No  Barriers to discharge: Yes       Entered by: Radha Guadalupe RN 01/23/2024   /76 (BP Location: Right arm)   Pulse 77   Temp 98.1  F (36.7  C) (Oral)   Resp 18   SpO2 96%       Please review provider order for any additional goals.   Nurse to notify provider when observation goals have been met and patient is ready for discharge.

## 2024-01-24 NOTE — PLAN OF CARE
PRIMARY DIAGNOSIS: ASTHMA EXACERBATION  OUTPATIENT/OBSERVATION GOALS TO BE MET BEFORE DISCHARGE:  1. Vital signs stable: Yes    2. Improvement of peak flow to greater than 70% sustained off nebulizer for 4 hours: No    3. Dyspnea improved and O2 sats >88% at RA or at prior home O2 therapy level: Yes      SpO2: 96 %, O2 Device: Nasal cannula    4. Short term supplemental O2 needed for use with activity at home: Yes, on  1L 02 nc    5. Tolerating adequate PO diet and medications: Yes    6. Return to near baseline physical activity: No    Discharge Planner Nurse   Safe discharge environment identified: No  Barriers to discharge: Yes       Entered by: Radha Guadalupe RN 01/24/2024   /76 (BP Location: Right arm)   Pulse 84   Temp 98  F (36.7  C) (Oral)   Resp 18   SpO2 97%    Pt alert to self only, very confused & agitated, pulling off her 02 and trying to get out of bed.Sitter at bedside. Pt. Has not voided since 2330hrs, Bladder scanned for 525ml and was straight cath per policy. On K+ and Mg protocol. 1L 02 to keep sats above 90.Sitter @ bedside.Will continue to redirect and encourage rest.  Please review provider order for any additional goals.   Nurse to notify provider when observation goals have been met and patient is ready for discharge.

## 2024-01-24 NOTE — CONSULTS
Care Management Initial Consult    General Information  Assessment completed with: Children, Daughter Kandice  Type of CM/SW Visit: Initial Assessment    Primary Care Provider verified and updated as needed: Yes   Readmission within the last 30 days: no previous admission in last 30 days      Reason for Consult: discharge planning  Advance Care Planning:            Communication Assessment  Patient's communication style: spoken language (English or Bilingual)    Hearing Difficulty or Deaf: no        Cognitive  Cognitive/Neuro/Behavioral: .WDL except, speech  Level of Consciousness: alert, confused  Arousal Level: arouses to voice  Orientation: disoriented to, place, time, situation  Mood/Behavior: cooperative, restless  Best Language: 0 - No aphasia  Speech: clear, spontaneous    Living Environment:   People in home: spouse, facility resident     Current living Arrangements: assisted living  Name of Facility: [facility]   Able to return to prior arrangements: yes       Family/Social Support:  Care provided by: other (see comments) ([facility] staff)  Provides care for:    Marital Status:   , Children          Description of Support System: Supportive, Involved    Support Assessment: Adequate family and caregiver support, Adequate social supports    Current Resources:   Patient receiving home care services: No     Does the patient's insurance plan have a 3 day qualifying hospital stay waiver?  No    Lifestyle & Psychosocial Needs:  Social Determinants of Health     Food Insecurity: Not on file   Depression: Not at risk (11/18/2022)    PHQ-2     PHQ-2 Score: 2   Housing Stability: Not on file   Tobacco Use: Low Risk  (1/2/2024)    Patient History     Smoking Tobacco Use: Never     Smokeless Tobacco Use: Never     Passive Exposure: Not on file   Financial Resource Strain: Not on file   Alcohol Use: Not on file   Transportation Needs: Not on file   Physical Activity: Not on file   Interpersonal Safety: Not on file    Stress: Not on file   Social Connections: Not on file       Functional Status:  Prior to admission patient needed assistance:   Dependent ADLs:: Wheelchair-with assist, Transfers, Toileting, Grooming, Dressing, Bathing, Incontinence  Dependent IADLs:: Cleaning, Cooking, Laundry, Shopping, Meal Preparation, Medication Management, Money Management, Transportation, Incontinence       Additional Information:  Care management consult for discharge planning/disposition. Patient admitted to observation for exacerbation of asthma, RSV, hypoxic respiratory failure. Initial consult completed with patient's daughter via phone. Patient lives at [facility] assisted living with her spouse. She is non-ambulatory at baseline. Transfers with an EZ-stand. assisted staff assist with transfers, dressing, bathing, meals, med management. Daughter states she is on the second highest level of services they provide. Anticipate patient will discharge back to her assisted.  Called the [facility] to verify services, get information for patient discharging back. Had to leave voicemail. Number is [phone].   Patient will be a wheelchair or stretcher for transport.   Care management to follow for discharge planning/coordination.     Marielle Hinson RN  Care Coordinator  [facility]

## 2024-01-24 NOTE — PROGRESS NOTES
Occupational Therapy: Orders received. Chart reviewed and discussed with care team.? Occupational Therapy not indicated due to pt has assist for ADL at baseline, lift dep at [facility]..? Defer discharge recommendations to care team.? Will complete orders.

## 2024-01-24 NOTE — PLAN OF CARE
PRIMARY DIAGNOSIS: ASTHMA EXACERBATION, RSV  OUTPATIENT/OBSERVATION GOALS TO BE MET BEFORE DISCHARGE:  1. Vital signs stable: No elevated BP, tachycardic     2. Improvement of peak flow to greater than 70% sustained off nebulizer for 4 hours: No    3. Dyspnea improved and O2 sats >88% at RA or at prior home O2 therapy level: Yes      SpO2: 96 %, O2 Device: High Flow Nasal Cannula (HFNC)    4. Short term supplemental O2 needed for use with activity at home: No    5. Tolerating adequate PO diet and medications: Yes    6. Return to near baseline physical activity:  WC baseline    Discharge Planner Nurse   Safe discharge environment identified: Yes  Barriers to discharge: Yes       Entered by: GIN NOE RN 01/24/2024    Vital signs:  Temp: 98  F (36.7  C) Temp src: Oral  Pulse: 98   Resp: 28 SpO2: 96 % O2 Device: High Flow Nasal Cannula (HFNC) Oxygen Delivery: 35 LPM   Alert to self. Disoriented to place, time and situation. Sitter at bedside.  Denies pain. Work of breathing noted increasing without hypoxia this afternoon with tachycardia, tachypnea, and elevated BP. Provider notified. Placed on high flow O2 by RT. IV Haldol given to calm pt while on High flow. Scheduled and PRN antihypertensive med added. On scheduled Neb. RT following. Hasn't voided today. Bladder scan @1300 showed 232cc. Will re-scan in 4 hrs if not voiding. WC bound with sonya lift at baseline. Call light in reach. Bed alarm on.      Please review provider order for any additional goals.    Please review provider order for any additional goals.   Nurse to notify provider when observation goals have been met and patient is ready for discharge.

## 2024-01-24 NOTE — UTILIZATION REVIEW
"[facility] Utilization Review  Admission Status; Secondary Review Determination     Admission Date: 1/22/2024  8:08 PM      Under the authority of the Utilization Management Committee, the utilization review process indicated a secondary review on the above patient.  The review outcome is based on review of the medical records, discussions with staff, and applying clinical experience noted on the date of the review.        (X) Observation Status Appropriate - This patient does not meet hospital inpatient criteria and is placed in observation status. If this patient's primary payer is Medicare and was admitted as an inpatient, Condition Code 44 should be used and patient status changed to \"observation\".   () Observation Status concurrent Review           RATIONALE FOR DETERMINATION   88-year-old female with history of asthma, hypertension, hyperlipidemia, dementia, admitted with difficulty breathing and found to have RSV infection with asthma exacerbation.  Negative D-dimer, BNP elevated, patient received DuoNebs which improved her symptoms.  Now started on oral steroids and home inhalers.  Troponins trending down, at risk for delirium and received Haldol 1/23 for agitation.  Patient now weaned off oxygen and is ready for discharge, does not meet criteria for inpatient stay, recommend continue observation status      The severity of illness, intensity of service provided, expected LOS make the care appropriate for observation status at this time.        The information on this document is developed by the utilization review team in order for the business office to ensure compliance.  This only denotes the appropriateness of proper admission status and does not reflect the quality of care rendered.              Sincerely,       Sergo Adkins MD  Physician Advisor  Utilization Review-[place]    Phone: [phone]     "

## 2024-01-24 NOTE — PLAN OF CARE
PRIMARY DIAGNOSIS: ASTHMA EXACERBATION, RSV  OUTPATIENT/OBSERVATION GOALS TO BE MET BEFORE DISCHARGE:  1. Vital signs stable: Yes    2. Improvement of peak flow to greater than 70% sustained off nebulizer for 4 hours: Yes    3. Dyspnea improved and O2 sats >88% at RA or at prior home O2 therapy level: Yes      SpO2: 94 %, O2 Device: Nasal cannula    4. Short term supplemental O2 needed for use with activity at home: No    5. Tolerating adequate PO diet and medications: Yes    6. Return to near baseline physical activity:  WC bound    Discharge Planner Nurse   Safe discharge environment identified: Yes  Barriers to discharge: Yes       Entered by: GIN NOE RN 01/24/2024    Vital signs:  Temp: 98.2  F (36.8  C) Temp src: Oral BP: (!) 163/102 Pulse: 86   Resp: 28 SpO2: 94 % O2 Device: Nasal cannula Oxygen Delivery: 1 LPM Alert to self. Disoriented to place, time and situation. Sitter at bedside.  Denies pain,SOB. Wean of from O2 this morning. On RA with continuous pulse-ox with sat >92%. On scheduled Neb. RT following. WC bound with sonya lift at baseline. Call light in reach. Bed alarm on.    Please review provider order for any additional goals.   Nurse to notify provider when observation goals have been met and patient is ready for discharge.

## 2024-01-24 NOTE — PLAN OF CARE
PRIMARY DIAGNOSIS: ASTHMA EXACERBATION  OUTPATIENT/OBSERVATION GOALS TO BE MET BEFORE DISCHARGE:  1. Vital signs stable: Yes    2. Improvement of peak flow to greater than 70% sustained off nebulizer for 4 hours: No    3. Dyspnea improved and O2 sats >88% at RA or at prior home O2 therapy level: Yes      SpO2: 96 %, O2 Device: Nasal cannula    4. Short term supplemental O2 needed for use with activity at home: Yes, on  1L 02 nc    5. Tolerating adequate PO diet and medications: Yes    6. Return to near baseline physical activity: No    Discharge Planner Nurse   Safe discharge environment identified: No  Barriers to discharge: Yes       Entered by: Radha Guadalupe RN 01/24/2024   /75 (BP Location: Right arm)   Pulse 77   Temp 98.2  F (36.8  C) (Oral)   Resp 18   SpO2 97%    Pt alert but every confused & agitated, pulling off her 02  and trying to get out of bed.Incontinent of bowel and bladder. Pt also pulled out her IV access & refusing to keep 02 on.Haldol administered with some relief. Sitter at bedside.Will continue to redirect and encourage rest.  Please review provider order for any additional goals.   Nurse to notify provider when observation goals have been met and patient is ready for discharge.

## 2024-01-24 NOTE — PLAN OF CARE
PRIMARY DIAGNOSIS: ASTHMA EXACERBATION, RSV    OUTPATIENT/OBSERVATION GOALS TO BE MET BEFORE DISCHARGE:  1. Vital signs stable: Yes ex for tachycardia     2. Improvement of peak flow to greater than 70% sustained off nebulizer for 4 hours: Yes    3. Dyspnea improved and O2 sats >88% at RA or at prior home O2 therapy level: Yes      SpO2: 92 %, O2 Device: None (Room air)    4. Short term supplemental O2 needed for use with activity at home: No    5. Tolerating adequate PO diet and medications: Yes    6. Return to near baseline physical activity:  WC bound    Discharge Planner Nurse   Safe discharge environment identified: Yes  Barriers to discharge: Yes       Entered by: GIN NOE RN 01/24/2024 12:59 PM   Vital signs:  Temp: 98.2  F (36.8  C) Temp src: Oral BP: (!) 163/102 Pulse: 86   Resp: 28 SpO2: 92 % O2 Device: None (Room air) Oxygen Delivery: 1 LPM Alert to self. Disoriented to place, time and situation. Sitter at bedside.  Denies pain,SOB. Weaned of from O2 this morning. On RA with continuous pulse-ox with sat >92%. On scheduled Neb. RT following. WC bound with sonya lift at baseline. Call light in reach. Bed alarm on.      Please review provider order for any additional goals.   Nurse to notify provider when observation goals have been met and patient is ready for discharge.

## 2024-01-25 NOTE — PLAN OF CARE
PRIMARY DIAGNOSIS: ASTHMA EXACERBATION  OUTPATIENT/OBSERVATION GOALS TO BE MET BEFORE DISCHARGE:  1. Vital signs stable: Yes, Tachycardia    2. Improvement of peak flow to greater than 70% sustained off nebulizer for 4 hours: Yes    3. Dyspnea improved and O2 sats >88% at RA or at prior home O2 therapy level: Yes      SpO2: 96 %, O2 Device: on 30L highflow    4. Short term supplemental O2 needed for use with activity at home: Yes.    5. Tolerating adequate PO diet and medications: Yes    6. Return to near baseline physical activity: No    Discharge Planner Nurse   Safe discharge environment identified: No  Barriers to discharge: Yes       Entered by: Radha Guadalupe RN 01/25/2024   BP (!) 136/92 (BP Location: Left arm)   Pulse 63   Temp 97.4  F (36.3  C) (Oral)   Resp 24   SpO2 96%    Pt resting in bed with eyes closed though  pulling on clothing and attempting to grab on bed rail. and tubing.redirected. Haldol administered x 1 for agitation, Changed and repositioned. Slept quietly for a few hours,Pure wick in place draining isidra yellow urine. On High flow 02 ( HFNC). Sitter @ bedside.Ongoing plan of care.  Please review provider order for any additional goals.   Nurse to notify provider when observation goals have been met and patient is ready for discharge.

## 2024-01-25 NOTE — PROGRESS NOTES
Respiratory Care Note:    Received pt on HFNC 35LPM, 30% for PEEP support. Increased flow to 40LPM due to increased WOB. Skin integrity is intact. Bs coarse/expiratory wheezes. RR 30. Will continue to assess and monitor.    Arturo Dalton RT on 1/25/2024 at 5:19 AM

## 2024-01-25 NOTE — PLAN OF CARE
PRIMARY DIAGNOSIS: ASTHMA EXACERBATION  OUTPATIENT/OBSERVATION GOALS TO BE MET BEFORE DISCHARGE:  1. Vital signs stable: Yes, Tachycardia    2. Improvement of peak flow to greater than 70% sustained off nebulizer for 4 hours: Yes    3. Dyspnea improved and O2 sats >88% at RA or at prior home O2 therapy level: Yes      SpO2: 96 %, O2 Device: on 30L highflow    4. Short term supplemental O2 needed for use with activity at home: Yes.    5. Tolerating adequate PO diet and medications: Yes    6. Return to near baseline physical activity: No    Discharge Planner Nurse   Safe discharge environment identified: No  Barriers to discharge: Yes       Entered by: Radha Guadalupe RN 01/25/2024   BP (!) 136/92 (BP Location: Left arm)   Pulse 76   Temp 97.4  F (36.3  C) (Oral)   Resp 20   SpO2 96%    Pt resting in bed with eyes closed though  pulling on clothing and attempting to grab bed rail. VSS. Denies pain. Pure wick in place draining isidra yellow urine. On high flow. Sitter @ bedside.  Please review provider order for any additional goals.   Nurse to notify provider when observation goals have been met and patient is ready for discharge.

## 2024-01-25 NOTE — UTILIZATION REVIEW
Admission Status; Secondary Review Determination       Under the authority of the Utilization Management Committee, the utilization review process indicated a secondary review on the above patient. The review outcome is based on review of the medical records, discussions with staff, and applying clinical experience noted on the date of the review.     (x) Inpatient Status Appropriate - This patient's medical care is consistent with medical management for inpatient care and reasonable inpatient medical practice.     RATIONALE FOR DETERMINATION   88-year-old female with a history of asthma, hypertension, hyperlipidemia, and possible underlying dementia was admitted with acute hypoxic respiratory failure, acute bronchitis due to respiratory syncytial virus (RSV) infection, and an exacerbation of asthma. She initially presented to the ED with difficulty breathing and was found to be hypertensive, tachycardic, and had an elevated troponin level.  Initially she was on observation and showed improvement in her oxygen requirement from 10 L/min down to room air on 1/24, earlier this morning her clinical status deteriorated requiring high flow oxygen at 40 L/min increasing shortness of breath significantly with diffuse wheezes, respiratory rate in the 30s, tachycardic with clear evidence of respiratory failure, more than 6 hours later she is still on 35 to 40 L/min of high flow oxygen.  Inpatient admission is imperative due to the significant risk of adverse outcomes if the patient is treated as an outpatient, particularly considering her history of asthma and the potential for severe respiratory distress, including respiratory failure, which could lead to hypoxia and even death if not managed promptly and intensively.  The expected length of stay at the time of admission was more than 2 nights because of the severity of illness, intensity of service provided, and risk for adverse outcome. Inpatient admission is  appropriate.         This document was produced using voice recognition software       The information on this document is developed by the utilization review team in order for the business office to ensure compliance. This only denotes the appropriateness of proper admission status and does not reflect the quality of care rendered.   The definitions of Inpatient Status and Observation Status used in making the determination above are those provided in the CMS Coverage Manual, Chapter 1 and Chapter 6, section 70.4.   Sincerely,   JUAN VANEGAS MD   System Medical Director   Utilization Management   [facility].

## 2024-01-25 NOTE — PLAN OF CARE
PRIMARY DIAGNOSIS: ASTHMA EXACERBATION, RSV   OUTPATIENT/OBSERVATION GOALS TO BE MET BEFORE DISCHARGE:  1. Vital signs stable: Yes    2. Improvement of peak flow to greater than 70% sustained off nebulizer for 4 hours: Yes    3. Dyspnea improved and O2 sats >88% at RA or at prior home O2 therapy level: Yes      SpO2: 94 %, O2 Device: High Flow Nasal Cannula (HFNC)    4. Short term supplemental O2 needed for use with activity at home: No    5. Tolerating adequate PO diet and medications: Yes    6. Return to near baseline physical activity:  wc bound baseline     Discharge Planner Nurse   Safe discharge environment identified: Yes  Barriers to discharge: Yes       Entered by: GIN NOE RN 01/25/2024    Vital signs:  Temp: 97.5  F (36.4  C) Temp src: Oral BP: (!) 143/87 Pulse: 69   Resp: 22 SpO2: 94 % O2 Device: High Flow Nasal Cannula (HFNC) Oxygen Delivery: 40 LPM Alert to self. Disoriented to place, time and situation. Denies pain. On 40L high flow O2 managed by RT. Pt denies SOB at rest. Calm and cooperative, sitter at bed side monitoring pt not to pull nasal cannula. On continuous pulse-ox. Sat >95%. HR in the 60s-70s. Purewick connected. Incontinent for bowel and bladder. On scheduled neb. Plan to move pt to closer room to nursing station when bed open. Bed alarm on.        Please review provider order for any additional goals.   Nurse to notify provider when observation goals have been met and patient is ready for discharge.

## 2024-01-25 NOTE — PLAN OF CARE
PRIMARY DIAGNOSIS: ASTHMA EXACERBATION  OUTPATIENT/OBSERVATION GOALS TO BE MET BEFORE DISCHARGE:  1. Vital signs stable: Yes, Tachycardia    2. Improvement of peak flow to greater than 70% sustained off nebulizer for 4 hours: Yes    3. Dyspnea improved and O2 sats >88% at RA or at prior home O2 therapy level: Yes      SpO2: 96 %, O2 Device: RA    4. Short term supplemental O2 needed for use with activity at home: Yes, on  1L 02 nc    5. Tolerating adequate PO diet and medications: Yes    6. Return to near baseline physical activity: No    Discharge Planner Nurse   Safe discharge environment identified: No  Barriers to discharge: Yes       Entered by: Radha Guadalupe RN 01/24/2024   BP (!) (P) 150/101 (BP Location: Left arm)   Pulse (P) 89   Temp 98  F (36.7  C) (Oral)   Resp (P) 28   SpO2 97%    Please review provider order for any additional goals.   Nurse to notify provider when observation goals have been met and patient is ready for discharge.

## 2024-01-25 NOTE — PLAN OF CARE
PRIMARY DIAGNOSIS: ASTHMA EXACERBATION, RSV  OUTPATIENT/OBSERVATION GOALS TO BE MET BEFORE DISCHARGE:  1. Vital signs stable: Yes    2. Improvement of peak flow to greater than 70% sustained off nebulizer for 4 hours: Yes    3. Dyspnea improved and O2 sats >88% at RA or at prior home O2 therapy level: Yes      SpO2: 97 %, O2 Device: High Flow Nasal Cannula (HFNC)    4. Short term supplemental O2 needed for use with activity at home: No    5. Tolerating adequate PO diet and medications: Yes    6. Return to near baseline physical activity:  wc bound    Discharge Planner Nurse   Safe discharge environment identified: Yes  Barriers to discharge: Yes       Entered by: GIN NOE RN 01/25/2024    Vital signs:  Temp: 97.5  F (36.4  C) Temp src: Oral BP: (!) 143/87 Pulse: 69   Resp: 20 SpO2: 97 % O2 Device: High Flow Nasal Cannula (HFNC) Oxygen Delivery: 35 LPM  Alert to self. Disoriented to place, time and situation. Denies pain. On 40L high flow O2 managed by RT. Pt denies SOB at rest. Calm and cooperative, sitter at bed side monitoring pt not to pull nasal cannula. On continuous pulse-ox. Sat 92%-95%. HR in the 60s-70s. Purewick connected. Incontinent for bowel and bladder. On scheduled neb. Bed alarm on.         Please review provider order for any additional goals.   Nurse to notify provider when observation goals have been met and patient is ready for discharge.

## 2024-01-25 NOTE — PROGRESS NOTES
[facility]    Medicine Progress Note - Hospitalist Service    Date of Admission:  1/22/2024    Assessment & Plan   bel James is a 88 year old female past medical history significant for asthma, hypertension, hyperlipidemia, dementia?  Who presented to the ED ER with complaints of difficulty breathing and was found to have RSV infection as well as asthma exacerbation.     In the ED her she was hypertensive, afebrile, tahcy in the 120s-130s, RR 26-30, O2 94% on 2L NC. BMP remarkable for glucose 148, Na 134, Cl 97. Troponin 15. BNP 2648. Negative D-Dimer. Positive RSV. Pt given Duoneb and 1L NS, which improved her sx.     Acute RSV infection.  Acute hypoxemic failure.  Acute on chronic asthma exacerbation.  -Initial improvement in oxygen requirement from 10 L/min upon presentation to down to 1 L/min on 1/23. -Continue DuoNebs 4 times daily.   -Continue prednisone 40 mg daily, anticipate total 5-day course (1/25/24--day 4) revaluate weaning in the AM.   -Continue home Breo Ellipta 1 puff daily-continue home loratadine 10 mg oral daily  -1/24 PM and required HFNC for increased work of breathing. Her O2 sats have normalized but she continues to require HFNC. After discussion with family, the pt would like HFNC and BiPAP, but DNR/DNI.  -1/25 pts labs are unremarkable. Plan to continue HFNC therapy and monitor. Wean as able.      Tachycardia  Elevated BP without prior history of known HTN   Troponin elevation.  Suspect demand supply due to current RSV infection and asthma exacerbation.  No chest pain.   -Trend troponin until peak. Initially 15 in ED and elevated to 71 on 1/23. Repeat down trending on 1/24 -61.   -Not on anti-HTNve medications at baseline.  Could be stress induced/agitation vs exacerbated by both prednisone and nebulizers/breathing tx.   Given history of asthma with treat with cardioselective BB and alpha blocker.   Start metoprolol tartrate 25 mg PO BID with hold parameters.   Will have metoprolol IV 5 mg q5 min x 3 doses for rescue.   If still elevated despite metoprolol have PRN hydralazine available.  Monitor.   -BP well controlled 1/25/24, 130s-140s/80s-90s. HR down to 60s-70s.      Possible underlying dementia  Delirium /agitation  Patient was pleasant, awake, alert.  She was oriented to self only.  Unable to answer question appropriately or provide meaningful history.  Per family --  she has some good days and bad days.   -1/24 continues to have confusion and trying to get out of bed. Sitter in room with pt.      -At risk for delirium, delirium precautions  -Monitor clinically  -given Haldol 1/23PM and 1/24 PM for agitation. Have quetiapine and haldol available.  Add Trazodone at  HS.      Mild hyponatremia-resolved  -Monitor sodium 134 on 1/22 repeat on 1/23 was 137, and 134 again on 1/24 and 1/25.   -Continue to monitor.      Leukopenia-resolved  -Unclear clinical significance at this time.  Monitor CBC daily  -CBC on 1/24 WBC 9.2 and 8.7 on 1/25       Observation Goals: -diagnostic tests and consults completed and resulted, -vital signs normal or at patient baseline, -tolerating oral intake to maintain hydration, -adequate pain control on oral analgesics, -dyspnea improved and O2 sats greater than 88% on room air or prior home oxygen levels, -returns to baseline functional status, -safe disposition plan has been identifieda, Nurse to notify provider when observation goals have been met and patient is ready for discharge.  Diet: Regular Diet Adult    DVT Prophylaxis: Enoxaparin (Lovenox) SQ  Rodney Catheter: Not present  Lines: None     Cardiac Monitoring: None  Code Status: No CPR- Do NOT Intubate      Clinically Significant Risk Factors Present on Admission        # Hypokalemia: Lowest K = 3.1 mmol/L in last 2 days, will replace as needed           # Hypertension: Noted on problem list   # Non-Invasive mechanical ventilation: current O2 Device: High Flow Nasal Cannula (HFNC)  #  "Acute hypoxic respiratory failure: continue supplemental O2 as needed  # Dementia: noted on problem list    # Overweight: Estimated body mass index is 26.57 kg/m  as calculated from the following:    Height as of an earlier encounter on 1/22/24: 1.6 m (5' 3\").    Weight as of an earlier encounter on 1/22/24: 68 kg (150 lb).       # Asthma: noted on problem list        Disposition Plan     Expected Discharge Date: 01/26/2024      Destination: home;assisted living            The patient's care was discussed with the Bedside Nurse, Care Coordinator/, Patient, and Patient's Family.    SOHA Metzger Ed.D, NADINE, CNP   Hospitalist Service  [facility]  Securely message with [employer] (more info)  Text page via [facility] Paging/Directory   ______________________________________________________________________  Attestation  1/25/2024 4:16 PM  As above.    Agree with plan of care and assessment.  \"I was present with the student who participated in the service and in the documentation of the note. I have verified the history and personally performed the physical exam and medical decision-making. I agree with the assessment and plan of care as documented in the note.\"     /82 (BP Location: Left arm)   Pulse 81   Temp 100  F (37.8  C) (Oral)   Resp 24   SpO2 91%    AxoX2.  FC.    BBS. Lungs coarse but decreased WOB.  RRR. S1S2.  Not tacycardiac  SNTND NABS.   ANDREA.  CMS intact. CR < 3 seconds. SILT.    MDM: as above and below.  Continue plan of care as outline  No evidence of superimposed bacterial pneumonia.      NADINE Woods CNP    --------------------------------------------------------------------------------------------------------------------------  Interval History   Pt is resting in bed. She keeps her eyes closed by will answer question yes/no and follows basic directions. Was given Haldol to help tolerate HFNC. VSS.     Physical Exam   Vital " Signs: Temp: 97.5  F (36.4  C) Temp src: Oral BP: (!) 143/87 Pulse: 69   Resp: 22 SpO2: 94 % O2 Device: High Flow Nasal Cannula (HFNC) Oxygen Delivery: 40 LPM  Weight: 0 lbs 0 oz    General: Somnolent, resting in bed. Follows commands but keeps eyes closed. NAD  Pulm: Non labored breathing. Intermittent coughing, worsened with deep inhale. Diffuse wheezing and crackles.  CV: RRR. No murmur   GI: Soft and non tender. Non distended.  MSK: moves all extremities spontaneously. No LE edema.   Neuro: Alert and oriented to self. No focal deficits.   Psych: Confused     Medical Decision Making       45 MINUTES SPENT BY ME on the date of service doing chart review, history, exam, documentation & further activities per the note.      Data   ------------------------- PAST 24 HR DATA REVIEWED -----------------------------------------------    I have personally reviewed the following data over the past 24 hrs:    8.7  \   13.8   / 302     134 (L) 98 19.3 /  84   4.0 28 0.58 \     Procal: 0.06 CRP: N/A Lactic Acid: N/A         Imaging results reviewed over the past 24 hrs:   Recent Results (from the past 24 hour(s))   XR Chest Port 1 View    Narrative    CHEST ONE VIEW  1/24/2024 4:21 PM     HISTORY: worsening hypoxic respiratory failure.  Hx of RSV    COMPARISON: 1/22/2024.      Impression    IMPRESSION: Cardiac silhouette appears borderline enlarged. Large  hiatal hernia. Increasing opacification of the right lung base which  may represent atelectasis or aspiration/pneumonia. No pleural effusion  or pneumothorax. Right upper quadrant abdominal surgical clips.    XIMENA CASAREZ MD         SYSTEM ID:  [id number]

## 2024-01-26 NOTE — PROGRESS NOTES
"                                                            [facility]    Date:1/26/24    Admission Dx:Asthma exacerbation    Pulmonary History: Asthma    Home Nebulizer/MDI Use:Albuterol MDI Q4 prn, Albuterol BID, Symbicort BID    Home Oxygen: none     Acuity Level (RCAT flow sheet): 4    Aerosol Therapy initiated: Albuterol Q6 prn    Pulmonary Hygiene initiated: coughing techniques    Volume Expansion initiated: incentive spirometry    Current Oxygen Requirements: 3L NC    Current SpO2:94%    Re-evaluation date: 1/29/24    Patient Education: Will educate pt on the indications and benefits of nebulizer's as well as deep breathing and coughing techniques.    Daniella Barron, RT on 1/26/2024 at 4:38 PM        See \"RT Assessments\" flow sheet for patient assessment scoring and Acuity Level Details.           "

## 2024-01-26 NOTE — PROGRESS NOTES
Respiratory Care Note:    Received pt on HFNC 25LPM, 21% for PEEP support. Duoneb given in line as ordered. Skin integrity is intact. Bs coarse/diminished. Will continue to assess and monitor.    Arturo Dalton RT on 1/26/2024 at 4:23 AM

## 2024-01-26 NOTE — PROGRESS NOTES
Care Management Follow Up    Length of Stay (days): 1    Expected Discharge Date: 01/29/2024     Concerns to be Addressed: discharge planning     Patient plan of care discussed at interdisciplinary rounds: Yes    Anticipated Discharge Disposition:  [facility]      Additional Information:  CM following for discharge planning, spoke with Candy at [facility] nurse Taj P: [phone]. Confirmed that pt is WC bound at baseline and transfers with EZ stand. prison staff assist with transfers, dressing, bathing, meals and med management.     Pt sometimes becomes anxious due to her asthma, this improves with her rescue inhaler. She denies pt having any other agitation or behavior symptoms at baseline. Once medically ready for discharge, they are able to accept back on the weekend before 1400 but would prefer Monday-Friday return. CM will continue to follow.     Mary Hannon RN BSN   Inpatient Care Coordination  [facility]   Phone [phone]

## 2024-01-26 NOTE — PROGRESS NOTES
[facility]    Medicine Progress Note - Hospitalist Service    Date of Admission:  1/22/2024    Assessment & Plan   bel James is a 88 year old female past medical history significant for asthma, hypertension, hyperlipidemia, dementia?  Who presented to the ED ER with complaints of difficulty breathing and was found to have RSV infection as well as asthma exacerbation.     In the ED her she was hypertensive, afebrile, tahcy in the 120s-130s, RR 26-30, O2 94% on 2L NC. BMP remarkable for glucose 148, Na 134, Cl 97. Troponin 15. BNP 2648. Negative D-Dimer. Positive RSV. Pt given Duoneb and 1L NS, which improved her sx.     Acute RSV infection.  Acute hypoxemic failure.  Acute on chronic asthma exacerbation.  -Initial improvement in oxygen requirement from 10 L/min upon presentation to down to 1 L/min on 1/23. And on 1/24 required HFNC with continued weaning to present. Currently on 3L NC.   -Continue DuoNebs 4 times daily.   -Continue prednisone 40 mg daily for now.  Consider weaning in the AM of Prednisone if she remains stable.   -Continue home Breo Ellipta 1 puff daily  -Continue home loratadine 10 mg oral daily  -1/24 PM and required HFNC for increased work of breathing. Her O2 sats have normalized but she continues to require HFNC. After discussion with family, the pt would like HFNC and BiPAP, but DNR/DNI.  -1/25 pts labs are unremarkable. Plan to continue HFNC therapy and monitor. Wean as able.   -1/26 able to wean to 3L NC.     Tachycardia  Elevated BP without prior history of known HTN   Troponin elevation.  Suspect demand supply due to current RSV infection and asthma exacerbation.  No chest pain.   -Trend troponin until peak. Initially 15 in ED and elevated to 71 on 1/23. Repeat down trending on 1/24 -61.   -Not on anti-HTNve medications at baseline.  Could be stress induced/agitation vs exacerbated by both prednisone and nebulizers/breathing tx.   Given history of asthma with  "treat with cardioselective BB and alpha blocker.   Start metoprolol tartrate 25 mg PO BID with hold parameters.  Will have metoprolol IV 5 mg q5 min x 3 doses for rescue.   If still elevated despite metoprolol have PRN hydralazine available.  Monitor.   -BP well controlled 1/25/24, 130s-140s/80s-90s. HR down to 60s-70s.   -1/26 pulse ox reading HR 40-50s but does not correlate to clinical exam. 12 lead EKG obtained and shows SR with PVC  -- likely pulse ox picking up extraneous beats.  Asymptomatic.   Not bradycardiac on 12 lead.  Actually in the 80s.  SR.  K 3.9 and Mg 2.1.  Discussed with family in the event of cardiac compromise they would not want resuscitation.  Trend labs.      Possible underlying dementia  Delirium /agitation  Patient was pleasant, awake, alert.  She was oriented to self only.  Unable to answer question appropriately or provide meaningful history.  Per family --  she has some good days and bad days.   -1/24 continues to have confusion and trying to get out of bed. Sitter discontinued 1/26/2024.    -Delirium precautions  -Monitor clinically. She was given Haldol 1/23PM and 1/24 PM for agitation. Have quetiapine and haldol available.  Added Trazodone at  HS.      Mild hyponatremia-resolved  -Monitor sodium 134 on 1/22 repeat on 1/23 was 137, and 134 again on 1/24 and 1/25.   -Continue to monitor.      Leukopenia-resolved  -Unclear clinical significance at this time.  Monitor CBC daily  -CBC on 1/24 WBC 9.2 and 8.7 on 1/25          Diet: Regular Diet Adult    DVT Prophylaxis: Enoxaparin (Lovenox) SQ  Rodney Catheter: Not present  Lines: None     Cardiac Monitoring: None  Code Status: No CPR- Do NOT Intubate      Clinically Significant Risk Factors                  # Hypertension: Noted on problem list       # Dementia: noted on problem list    # Overweight: Estimated body mass index is 26.57 kg/m  as calculated from the following:    Height as of an earlier encounter on 1/22/24: 1.6 m (5' 3\").    " Weight as of an earlier encounter on 1/22/24: 68 kg (150 lb)., PRESENT ON ADMISSION     # Asthma: noted on problem list        Disposition Plan  per clinical course. Will need PT evaluation when appropriate (likely next day or so) and I anticipate discharge to TCU being warranted. Likely here through the weekend.          The patient's care was discussed with the Bedside Nurse, Care Coordinator/, Patient, and Patient's Family.      Uriah Avery Ed.D, APRN, CNP   Hospitalist Service  [facility]  Securely message with [employer] (more info)  Text page via [employer] Paging/Directory     -------------------------------------------------------------------------------------------------------------------  Interval History   Pt is resting in bed. She keeps her eyes closed by will answer question yes/no and follows basic directions. Was given Haldol to help tolerate HFNC. VSS.     Physical Exam   Vital Signs: Temp: 97.5  F (36.4  C) Temp src: Axillary BP: 114/69 Pulse: 87   Resp: 16 SpO2: 93 % O2 Device: Nasal cannula Oxygen Delivery: 3 LPM  Weight: 0 lbs 0 oz    General: Somnolent, resting in bed. Follows commands but keeps eyes closed. NAD  Pulm: Non labored breathing. Intermittent coughing, worsened with deep inhale. Weaning of oxygen now down to 3L  CV: RRR. No murmur   GI: Soft and non tender. Non distended.  MSK: moves all extremities spontaneously. No LE edema.   Neuro: Alert and oriented to self. No focal deficits.   Psych: Confused     Medical Decision Making       45 MINUTES SPENT BY ME on the date of service doing chart review, history, exam, documentation & further activities per the note.      Data   ------------------------- PAST 24 HR DATA REVIEWED -----------------------------------------------    I have personally reviewed the following data over the past 24 hrs:    N/A  \   N/A   / N/A     N/A N/A N/A /  N/A   3.9 N/A N/A \       Imaging results reviewed over the past 24  hrs:   No results found for this or any previous visit (from the past 24 hour(s)).

## 2024-01-26 NOTE — PLAN OF CARE
Vitals are Temp: 99  F (37.2  C) Temp src: Oral BP: 121/73 Pulse: 70   Resp: 24 SpO2: 96 %.  Patient is Disorientated to, Time, Place, and Situation. They are 2 assist with Lift.  Patient is on contact and droplet precautions for RSV.  Pt is a Regular diet.  They are not showing any nonverbal signs of being in pain.  Patient is Saline locked. HFNC 25LPM. Continuous pulse ox on. O2 sats dropping into the 80s this morning, paged RT. PRN Seroquel administered for agitation. Incontinent of bowel and bladder. Purewick in place. Patient was not voiding, bladder scan for 544. Straight cath for 600ml of isidra colored urine. On potassium and magnesium replacement, to be rechecked this morning. RT and SW following. Plan is to return to [facility] when medically ready.

## 2024-01-27 NOTE — PROGRESS NOTES
"CLINICAL NUTRITION SERVICES - ASSESSMENT NOTE     Nutrition Prescription    RECOMMENDATIONS FOR MDs/PROVIDERS TO ORDER:  none    Malnutrition Status:    Patient does not meet two of the above criteria necessary for diagnosing malnutrition    Recommendations already ordered by Registered Dietitian (RD):  Chocolate Ensure Enlive daily w/ lunch  Gelatein BID w/ breakfast and supper  Multivitamin w/ cosign as pt is meeting <75% nutrition needs    Future/Additional Recommendations:  Adjust supplements pending PO intake/pt preference       REASON FOR ASSESSMENT  Edmundo James is a/an 88 year old female assessed by the dietitian for Admission Nutrition Risk Screen for positive    Patient presents with RSV, acute on chronic asthma, HTN, tachycardia, acute respiratory failure    NUTRITION HISTORY  Pt stated having a \"pretty good\" appetite. NKFA. Does not follow a particular diet. Claims no difficulties swallowing or chewing. Has not noticed a change in her weight.     CURRENT NUTRITION ORDERS  Diet: Regular  Intake/Tolerance: Pt eating 50% of meals per nursing records and receiving on average 1348kcals and 40g protein daily    LABS  Labs reviewed: Na 133, UN 24.0, PCO2 34     MEDICATIONS  Medications reviewed: Pepcid, deltasone, cholecalciferol     ANTHROPOMETRICS  Height: 160 cm (5' 3\")  Most Recent Weight: 68 kg (150 lb)   IBW: 52.4 kg  BMI: Overweight BMI 25-29.9  Weight History:   Wt Readings from Last 30 Encounters:   01/22/24 68 kg (150 lb)   01/18/24 68 kg (150 lb)   01/02/24 66.7 kg (147 lb)   11/21/23 65.3 kg (144 lb)   10/26/23 65.3 kg (144 lb)   10/02/23 65.3 kg (144 lb)   08/09/23 65.8 kg (145 lb 1.6 oz)   07/05/23 63.5 kg (140 lb)   05/02/23 63.5 kg (140 lb)   03/28/23 63.5 kg (140 lb)   02/21/23 63.5 kg (140 lb)   01/17/23 62.1 kg (136 lb 12.8 oz)   11/18/22 61.7 kg (136 lb)   11/01/22 61.7 kg (136 lb)   09/06/22 62.9 kg (138 lb 11.2 oz)   07/05/22 55.8 kg (123 lb)   05/31/22 55.9 kg (123 lb 3.2 oz) "   04/11/22 58.8 kg (129 lb 9.6 oz)   02/21/22 58.5 kg (129 lb)   02/01/22 58.5 kg (129 lb)   01/11/22 58.5 kg (129 lb)   12/21/21 58.7 kg (129 lb 8 oz)   12/13/21 58.5 kg (129 lb)   12/07/21 58.8 kg (129 lb 9.6 oz)   11/29/21 58.5 kg (129 lb)   10/19/21 58.8 kg (129 lb 9.6 oz)   09/21/21 58.9 kg (129 lb 12.8 oz)   09/19/21 58.4 kg (128 lb 12.8 oz)   09/13/21 58.9 kg (129 lb 12.8 oz)   09/07/21 58.5 kg (129 lb)       Dosing Weight: 68 kg and 56.3 kg for protein/fluid    ASSESSED NUTRITION NEEDS  Estimated Energy Needs: 1316 kcals/day ([place] [employer])  Justification: Maintenance  Estimated Protein Needs: 68-84 grams protein/day (1.2 - 1.5 grams of pro/kg)  Justification: Maintenance  Estimated Fluid Needs: 6210-1470 mL/day (25 - 30 mL/kg)   Justification: Maintenance    PHYSICAL FINDINGS  See malnutrition section below.  Abrasion/excoriation buttocks/IT       MALNUTRITION:  % Weight Loss:  None noted  % Intake:  Decreased intake does not meet criteria for malnutrition as pt has met at least 75% estimated energy needs within last few days  Subcutaneous Fat Loss:  Upper arm region moderate depletion  Muscle Loss:  Clavicle bone region mild to moderate depletion, Acromion bone region moderate depletion, Dorsal hand region moderate to severe depletion, Patellar region severe depletion, and Posterior calf region mild to moderate depletion  Fluid Retention:  None noted  Patient does not meet two of the above criteria necessary for diagnosing malnutrition      NUTRITION DIAGNOSIS  Inadequate protein-energy intake related to decreased appetite as evidenced by eating 51% estimated energy needs and 26% estimated protein needs      INTERVENTIONS  Implementation  Nutrition Education: Not appropriate at this time due to patient condition   Medical food supplement therapy  Multivitamin/mineral supplement therapy     Goals  Patient to consume % of nutritionally adequate meals three times per day, or the equivalent with  supplements/snacks.  Total avg nutritional intake to meet a minimum of 1316 kcal/kg and 68 g PRO/kg daily (per dosing wt 68 kg and 56.3 kg).     Monitoring/Evaluation  Progress toward goals will be monitored and evaluated per protocol. PO intake, supplement tolerance, wt, labs

## 2024-01-27 NOTE — PROGRESS NOTES
[facility]    Medicine Progress Note - Hospitalist Service    Date of Admission:  1/22/2024    Assessment & Plan   Edmundo James is a 88 year old female past medical history significant for asthma, hypertension, hyperlipidemia, and suspected dementia who presented to the ED with complaints of difficulty breathing and was found to have RSV infection as well as asthma exacerbation.     In the ED she was afebrile with heart rate of 92, pressure 140/97 and breathing 24 breaths/min on 94% oxygen saturation.  Lab work was remarkable for creatinine 0.54, sodium 134, chloride 97, normal electrolytes, glucose 148, BNP 2648, high-sensitivity troponin of 15, venous pH is 7.43 and normal CBC.  COVID/influenza are negative and RSV is positive.  EKG showed sinus tachycardia and chest x-ray did not show pneumonia.  Per ED note she required 10 L of oxygen to keep sats above 90% and was given DuoNeb and a liter bolus with request for admission.    On admission she was continued on DuoNebs and prednisone with improvement of acute hypoxemic respiratory failure requiring only 1 to 2 L.  Her troponin did trend up but suspect that was due to demand related to current RSV infection and asthma.  It has now stabilized.    #Acute hypoxemic respiratory failure due to RSV infection and asthma exacerbation  #History of asthma  -Presentation required 10 L/min but this has since been weaned to 2 L.  On 1/24 she did have limited episode of shortness of breath requiring high flow nasal cannula  -Continue DuoNebs 4 times daily  -Continue prednisone 40 mg daily  -Continue Breo Ellipta and loratidine  -Patient is hopeful to return home tomorrow  -Encourage incentive spirometry  -Given current oxygen need she may need to go home on oxygen for short period of time    # Intermittent tachycardia   # Hypertension  # Troponin elevation  -No complaints of chest pain but admission troponin was 15 and peaked at 71 on 1/23  -Suspect  "demand ischemia related to RSV infection asthma exacerbation  -Pressures were also elevated on admission so she was started on metoprolol 25 mg twice daily, will discontinue on 1/27  -Continue Valsartan  -Pressures and heart rate since this time have been better  -1/26 pulse ox reading HR 40-50s but does not correlate to clinical exam. 12 lead EKG obtained and shows SR with PVC  -- likely pulse ox picking up extraneous beats.  Asymptomatic. Not bradycardiac on 12 lead.  Actually in the 80s in SR    #Possible underlying dementia  #Delirium /agitation  -Patient was pleasant, awake, alert.  She was oriented to self only.  Unable to answer question appropriately or provide meaningful history.  Per family --  she has some good days and bad days.   -1/24 continues to have confusion and trying to get out of bed. Sitter discontinued 1/26/2024.    -Delirium precautions  -Monitor clinically. She was given Haldol 1/23PM and 1/24 PM for agitation. -Have quetiapine and haldol available.  Added Trazodone at  [facility].     #Mild hyponatremia-resolved  -Monitor sodium 134 on 1/22 repeat on 1/23 was 137, and 134 again on 1/24 and 1/25.   -Continue to monitor.      #Leukopenia-resolved  -Unclear clinical significance at this time.  Monitor CBC daily  -CBC on 1/24 WBC 9.2 and 8.7 on 1/25        Diet: Regular Diet Adult    DVT Prophylaxis: Enoxaparin (Lovenox) SQ  Rodney Catheter: Not present  Lines: None     Cardiac Monitoring: None  Code Status: No CPR- Do NOT Intubate      Clinically Significant Risk Factors                  # Hypertension: Noted on problem list     # Dementia: noted on problem list    # Overweight: Estimated body mass index is 26.57 kg/m  as calculated from the following:    Height as of an earlier encounter on 1/22/24: 1.6 m (5' 3\").    Weight as of an earlier encounter on 1/22/24: 68 kg (150 lb)., PRESENT ON ADMISSION     # Asthma: noted on problem list        Disposition Plan     Expected Discharge Date: 01/29/2024    "   Destination: home;assisted living            The patient's care was discussed with the Bedside Nurse, Patient, and Patient's Family.    Arcelia Kong PA-C  Hospitalist Service  [facility]  Securely message with [employer] (more info)  Text page via [employer] Paging/Directory   ______________________________________________________________________    Interval History   Patient reports she feels well today and is hopeful to go home tomorrow.  She denies shortness of breath, cough, chest discomfort, nausea, vomiting and diarrhea.    Physical Exam   Vital Signs: Temp: 97.3  F (36.3  C) Temp src: Oral BP: (!) 145/80 Pulse: 67   Resp: 16 SpO2: 92 % O2 Device: Nasal cannula Oxygen Delivery: 2 LPM  Weight: 0 lbs 0 oz    General Appearance: Alert and oriented x 3  Respiratory: Clear to auscultation bilaterally  Cardiovascular: RRR without murmur  GI: Bowel sounds are present without tenderness  Skin: No rashes or open sores are noted      Medical Decision Making       45 MINUTES SPENT BY ME on the date of service doing chart review, history, exam, documentation & further activities per the note.      Data     I have personally reviewed the following data over the past 24 hrs:    9.0  \   13.6   / 283     133 (L) 98 24.0 (H) /  86   4.2 25 0.56 \     Procal: N/A CRP: N/A Lactic Acid: 0.8         Imaging results reviewed over the past 24 hrs:   No results found for this or any previous visit (from the past 24 hour(s)).

## 2024-01-27 NOTE — PLAN OF CARE
INPATIENT NOTE: CARES PROVIDED FROM [id number]  Diagnosis:  Asthma/SOB  Temp: 97.5  F (36.4  C) Temp src: Axillary BP: 114/69 Pulse: 87   Resp: 16 SpO2: 93 % O2 Device: Nasal cannula Oxygen Delivery: 3 LPM    Labs: M.1, K: 3.9   Alert to self and place, disoriented to situation/time. At times miriam; EKG completed SR with PVCs. Weaned to 3L of O2 N.C. Aching on legs/knee - reposition, requested volteran gel. Declined Tylenol. Denies SOB/CP/Nausea. Restored IV; then pt pulled out. Buttock has previous scar/scab placed mepilex. Needs assistance with meals; encouraging PO intake. Bladder scan: 233, 281. No bladder pressure.       Plan: Wean off O2, encourage cough/deep breathing. Mg and K RN protocol. SW following.

## 2024-01-27 NOTE — PLAN OF CARE
INPATIENT NOTE: [id number]     PRIMARY PROBLEM: Asthma Exacerbation, SOB, RSV      /82 (BP Location: Left arm)   Pulse 86   Temp 98.3  F (36.8  C) (Oral)   Resp 18   SpO2 94%       Orientation: Alert to self    Pain status: complains of pain in her legs when moved or touched, received Prn tylenol, and repositioned, Voltaren gel applied.   Resp: on 3 L of o2 via NC   Cardiac: WDL   GI: Fecal Incontinence but no BM overnight.   : Urinary retention, bladder scans at 304 overnight and Straight cathed once and got 300 ml removed.   Skin: redness on buttocks, scab on coccyx mepilex applied.    LDA: None, pt removed multiple times, providers notified and agreed to keep them out for now.   Diet: Regular, needs assistance with meals   Activity: Ax2 with lift  Alarms/Safety: Bed alarm    Consults: SW following for placement   Discharge Plan: Back to [facility] when medically ready, possibly TCU   Discharge Date: TBD

## 2024-01-27 NOTE — PLAN OF CARE
Goal Outcome Evaluation:     Nutrition Prescription    RECOMMENDATIONS FOR MDs/PROVIDERS TO ORDER:  none    Malnutrition Status:    Patient does not meet two of the above criteria necessary for diagnosing malnutrition    Recommendations already ordered by Registered Dietitian (RD):  Chocolate Ensure Enlive daily w/ lunch  Gelatein BID w/ breakfast and supper  Multivitamin w/ cosign as pt is meeting <75% nutrition needs    Future/Additional Recommendations:  Adjust supplements pending PO intake/pt preference

## 2024-01-28 NOTE — PLAN OF CARE
"INPATIENT NOTE: [id number]     PRIMARY PROBLEM: Asthma Exacerbation, SOB, RSV      BP (!) 150/86 (BP Location: Left arm)   Pulse 73   Temp 97.5  F (36.4  C) (Oral)   Resp 16   Ht 1.6 m (5' 3\")   SpO2 98%   BMI 26.57 kg/m        Orientation: Alert to self    Pain status:managed with schedule medication   Resp: on 3 L of o2 via NC   Cardiac: WDL   GI: Fecal Incontinence, underwear change for BM   : Urinary retention, bladder scans and documented,   Skin: redness, barrier cream applied.    LDA: No I V Acesss  Diet: Regular, needs assistance with meals   Activity: Ax2 with lift  Alarms/Safety: Bed alarm    Consults: PIERRE following for placement   Discharge Plan: Anticipating  TCU   Discharge Date: TBD     "

## 2024-01-28 NOTE — PLAN OF CARE
Still requiring 2L of oxygen, agreeable to food and water, some confusion and forgetfulness as she did not know where she is and why she is here. More confused starting about 1700, as she continually call to ask people to come in and asking where she is. Daughter at the bedside after lunch. Will keep monitoring.

## 2024-01-28 NOTE — PROGRESS NOTES
[facility]    Medicine Progress Note - Hospitalist Service    Date of Admission:  1/22/2024    Assessment & Plan   Edmundo James is a 88 year old female past medical history significant for asthma, hypertension, hyperlipidemia and suspected dementia who presented to the ED with complaints of difficulty breathing and was found to have RSV infection as well as asthma exacerbation.     In the ED she was afebrile with heart rate of 92, pressure 140/97 and breathing 24 breaths/min on 94% oxygen saturation.  Lab work was remarkable for creatinine 0.54, sodium 134, chloride 97, normal electrolytes, glucose 148, BNP 2648, high-sensitivity troponin of 15, venous pH is 7.43 and normal CBC.  COVID/influenza are negative and RSV is positive.  EKG showed sinus tachycardia and chest x-ray did not show pneumonia.  Per ED note she required 10 L of oxygen to keep sats above 90% and was given DuoNeb and a liter bolus with request for admission.    On admission she was continued on DuoNebs and prednisone with improvement of acute hypoxemic respiratory failure requiring only 1 to 2 L. We have been unable to wean her further.  Her troponin did trend up but suspect that was due to demand related to current RSV infection and asthma. She is wheelchair bound at baseline so family would like her to go back to her assisted living facility rather than a TCU. Hoping for discharge back to [facility] with home oxygen on 1/29.    #Acute hypoxemic respiratory failure due to RSV infection and asthma exacerbation  #History of asthma  -On presentation required 10 L/min but this has since been weaned to 2 L.  On 1/24 she did have limited episode of shortness of breath requiring high flow nasal cannula  -Received prednisone 40 mg x 6 days  -Continue Breo Ellipta and loratidine  -Levalbuterol PRN  -Encourage incentive spirometry  -Patient and family expecting to return home tomorrow as long as she does not have clinical  decline  -Will need home O2 orders    # Intermittent tachycardia   # Hypertension  # Troponin elevation  -No complaints of chest pain but admission troponin was 15 and peaked at 71 on 1/23  -Suspect demand ischemia related to RSV infection asthma exacerbation  -Pressures were also elevated on admission so she was started on metoprolol 25 mg twice daily, will discontinue on 1/27  -Continue Valsartan  -Pressures and heart rate since this time have been better  -1/26 pulse ox reading HR 40-50s but does not correlate to clinical exam. 12 lead EKG obtained and shows SR with PVC  -- likely pulse ox picking up extraneous beats.  Asymptomatic. Not bradycardiac on 12 lead.  Actually in the 80s in SR    #Likely underlying dementia  #Intermittent agitation/sundowning  -Patient was pleasant, awake, alert.  She was oriented to self only.  Unable to answer question appropriately or provide meaningful history.  Per family --  she has some good days and bad days.   -1/24 continues to have confusion and trying to get out of bed. Sitter discontinued 1/26/2024.    -Delirium precautions  -Monitor clinically. She was given Haldol 1/23PM and 1/24 PM for agitation.  -Have quetiapine and haldol available.    - Added Trazodone at  [facility]    #Mild hyponatremia-resolved  -Monitor sodium 134 on 1/22 repeat on 1/23 was 137, and 134 again on 1/24 and 1/25.   -Continue to monitor.      #Leukopenia-resolved  -Unclear clinical significance at this time.  Monitor CBC daily  -CBC on 1/24 WBC 9.2 and 8.7 on 1/25        Diet: Regular Diet Adult    DVT Prophylaxis: Enoxaparin (Lovenox) SQ  Rodney Catheter: Not present  Lines: None     Cardiac Monitoring: None  Code Status: No CPR- Do NOT Intubate            Diet: Regular Diet Adult  Snacks/Supplements Adult: Ensure Enlive; With Meals  Snacks/Supplements Adult: Gelatein Plus; With Meals    DVT Prophylaxis: Enoxaparin (Lovenox) SQ  Rodney Catheter: Not present  Lines: None     Cardiac Monitoring: None  Code  "Status: No CPR- Do NOT Intubate      Clinically Significant Risk Factors                  # Hypertension: Noted on problem list     # Dementia: noted on problem list    # Overweight: Estimated body mass index is 26.57 kg/m  as calculated from the following:    Height as of this encounter: 1.6 m (5' 3\").    Weight as of an earlier encounter on 1/22/24: 68 kg (150 lb)., PRESENT ON ADMISSION     # Asthma: noted on problem list        Disposition Plan     Expected Discharge Date: 01/29/2024      Destination: home;assisted living            The patient's care was discussed with the Bedside Nurse, Patient, and Patient's Family.    Arcelia Kong PA-C  Hospitalist Service  [facility]  Securely message with [employer] (more info)  Text page via [employer] Paging/Directory   ______________________________________________________________________    Interval History   No complaints today.  Reportedly patient had sundowning yesterday afternoon and has consistently tried to take off her close.  She is alert and pleasant with me today.  She denies shortness of breath, cough, chest pain and abdominal pain.  She is eating/drinking without difficulty    Physical Exam   Vital Signs: Temp: 98.7  F (37.1  C) Temp src: Oral BP: 137/82 Pulse: 93   Resp: 16 SpO2: 90 % O2 Device: Nasal cannula Oxygen Delivery: 2 LPM  Weight: 0 lbs 0 oz    General Appearance: Alert, orientated to person and place  Respiratory: CTAB  Cardiovascular: RRR without murmur  GI: Bowel sounds are present without tenderness  Skin: No rash or open sores      Medical Decision Making       55 MINUTES SPENT BY ME on the date of service doing chart review, history, exam, documentation & further activities per the note.      Data     I have personally reviewed the following data over the past 24 hrs:    N/A  \   N/A   / N/A     N/A N/A N/A /  N/A   3.8 N/A N/A \       Imaging results reviewed over the past 24 hrs:   No results found for this or any " previous visit (from the past 24 hour(s)).

## 2024-01-29 NOTE — PLAN OF CARE
Patient's After Visit Summary was sent to [facility].   Patient verbalized understanding of After Visit Summary, recommended follow up and was given an opportunity to ask questions.   Discharge medications sent home with patient/family: No   Discharged with transport tech    Goal Outcome Evaluation:

## 2024-01-29 NOTE — DISCHARGE SUMMARY
"[facility]  Hospitalist Discharge Summary      Date of Admission:  1/22/2024  Date of Discharge:  1/29/2024  Discharging Provider: AUBREY Poole PA-C  Discharge Service: Hospitalist Service    Discharge Diagnoses   Acute hypoxemic respiratory failure due to RSV infection and asthma exacerbation   Sinus rhythm with frequent PVCs  Concern for underlying cognitive impairment  Mild hyponatremia  Leukopenia    Clinically Significant Risk Factors     # Overweight: Estimated body mass index is 26.57 kg/m  as calculated from the following:    Height as of this encounter: 1.6 m (5' 3\").    Weight as of an earlier encounter on 1/22/24: 68 kg (150 lb).       Follow-ups Needed After Discharge   Follow up with primary care provider, Daria Jefferson, within 7 days for hospital and oxygen follow- up reassess if able to wean off oxygen. The following labs/tests are recommended: BMP.      Discharge Disposition   Discharged to assisted living  Condition at discharge: Stable    Hospital Course   Edmundo James is a 88 year old female past medical history significant for asthma, hypertension, hyperlipidemia and suspected dementia who presented to the ED with complaints of difficulty breathing and was found to have RSV infection as well as asthma exacerbation.      In the ED she was afebrile with heart rate of 92, pressure 140/97 and breathing 24 breaths/min on 94% oxygen saturation.  Lab work was remarkable for creatinine 0.54, sodium 134, chloride 97, normal electrolytes, glucose 148, BNP 2648, high-sensitivity troponin of 15, venous pH is 7.43 and normal CBC.  COVID/influenza are negative and RSV is positive.  EKG showed sinus tachycardia and chest x-ray did not show pneumonia.  Per ED note she required 10 L of oxygen to keep sats above 90% and was given DuoNeb and a liter bolus with request for admission.     On admission she was continued on DuoNebs and prednisone with improvement of acute " hypoxemic respiratory failure requiring only 1 to 2 L. We have been unable to wean her further.  Her troponin did trend up but suspect that was due to demand related to current RSV infection and asthma. She is wheelchair bound at baseline so family would like her to go back to her assisted living facility rather than a TCU. Hoping for discharge back to [facility] with home oxygen on 1/29.    Assumed patient care today.  Patient still requiring 1 to 2 L of oxygen to maintain saturations in the low 90s.  Patient denies any shortness of breath or chest pain.  Her main concern today was of right knee pain.  No recent falls, injury, erythema, bruising or edema.  Recommended Voltaren gel as needed to the right knee.  Patient was discharged on home O2.  I did discharge the patient on a prednisone taper over the next week.  She did receive 6 days of 40 mg of prednisone.  I am hoping this taper will help her be able to wean off the oxygen.  During admission there was also concerns for intermittent tachycardia and bradycardia as readings fluctuated frequently on pulse oximetry.  Placed on cardiac telemetry for short period of time that showed sinus rhythm with frequent PVCs thus the pulse ox was not reading her heart rate correctly.  Heart rate remained in the mid 80s on cardiac telemetry.  During admission there was also concerns for possible underlying cognitive impairment or dementia due to intermittent agitation/sundowning. Unable to answer question appropriately or provide meaningful history at times.  Per family --  she has some good days and bad days.  Her mild hyponatremia and leukopenia resolved prior to discharge.  Time of discharge patient denied any fevers, chills, chest pain, shortness of breath, abdominal pain, nausea/vomiting.  Educated patient and family member on return precautions and answered all questions prior to discharge.  Patient returned back to her assisted living facility via wheelchair transportation on  home oxygen.    Consultations This Hospital Stay   CARE MANAGEMENT / SOCIAL WORK IP CONSULT  PHYSICAL THERAPY ADULT IP CONSULT  OCCUPATIONAL THERAPY ADULT IP CONSULT    Code Status   No CPR- Do NOT Intubate    Time Spent on this Encounter   I, AUBREY Poole PA-C, personally saw the patient today and spent less than or equal to 30 minutes discharging this patient.       AUBREY Poole PA-C  [facility] OBSERVATION DEPT  [address]  [facility] [address]  Phone: [phone]  ______________________________________________________________________    Physical Exam   Vital Signs: Temp: 98  F (36.7  C) Temp src: Oral BP: (!) 146/80 Pulse: 54   Resp: 16 SpO2: 92 % O2 Device: Nasal cannula Oxygen Delivery: 3 LPM  Weight: 0 lbs 0 oz    GENERAL:  Alert, Comfortable, No acute distress. Sitting up in bed.  PSYCH: pleasant, oriented.  HEENT:  Normocephalic, No scleral icterus or conjunctival injection  HEART:  Normal S1, S2 with no murmur, RRR with occasional skip beat  LUNGS:  Normal Respiratory effort. Clear to auscultation bilaterally with no wheezing, rales or ronchi.  EXTREMITIES:  No pedal edema, No cyanosis. Right knee no erythema, edema, slight tenderness superior patella, no ecchymosis  SKIN:  Warm, dry to touch  NEUROLOGIC:  Speech clear, alert       Primary Care Physician   Daria Jefferson    Discharge Orders      Reason for your hospital stay    You were admitted to the hospital due to low oxygen and found to have RSV with exacerbation of your underlying asthma.  You were given steroids in the hospital and oxygen as needed.  You will discharge on a prednisone taper over the next week to hopefully help wean off the oxygen.  You will need to follow-up with your primary care provider in 1 to 2 weeks for repeat BMP and reassess oxygen needs.  Will also send you home with incentive spirometer which you should try to use several times a day.     Follow-up and recommended labs and tests      Follow up with primary care provider, Daria Jefferson, within 7 days for hospital and oxygen follow- up reassess if able to wean off oxygen.  The following labs/tests are recommended: BMP.     Activity    Your activity upon discharge: activity as tolerated, try to use incentive spirometry several times daily     When to contact your care team    Call your primary doctor if you have any of the following:  increased shortness of breath, wheezing.     Oxygen Adult/Peds    Oxygen Documentation  I certify that this patient, Edmundo James has been under my care (or a nurse practitioner or physican's assistant working with me). This is the face-to-face encounter for oxygen medical necessity.      At the time of this encounter, I have reviewed the qualifying testing and have determined that supplemental oxygen is reasonable and necessary and is expected to improve the patient's condition in a home setting.       Patient has continued oxygen desaturation due to Acute Respiratory Failure J96.01  RSV J12.1.    If portability is ordered, is the patient mobile within the home? yes     Diet    Follow this diet upon discharge: Regular       Significant Results and Procedures   Results for orders placed or performed during the hospital encounter of 01/22/24   XR Chest 2 Views    Narrative    EXAM: XR CHEST 2 VIEWS  LOCATION: [facility]  DATE: 1/22/2024    INDICATION: Shortness of breath, rsv recently, worsening dyspnea  COMPARISON: None.      Impression    IMPRESSION: Cardiac silhouette is at the upper limit of normal size given portable technique. Very large hiatal hernia with majority of the stomach and possible other structures in the chest. Given this limitation, there is no definite airspace   consolidation, pleural effusion or pneumothorax. No acute bony abnormality.   XR Chest Port 1 View    Narrative    CHEST ONE VIEW  1/24/2024 4:21 PM     HISTORY: worsening hypoxic respiratory failure.   Hx of RSV    COMPARISON: 1/22/2024.      Impression    IMPRESSION: Cardiac silhouette appears borderline enlarged. Large  hiatal hernia. Increasing opacification of the right lung base which  may represent atelectasis or aspiration/pneumonia. No pleural effusion  or pneumothorax. Right upper quadrant abdominal surgical clips.    XIMENA CASAREZ MD         SYSTEM ID:  [id number]       Discharge Medications   Current Discharge Medication List        CONTINUE these medications which have CHANGED    Details   predniSONE (DELTASONE) 20 MG tablet Take 2 tablets (40 mg) by mouth daily for 1 day, THEN 1.5 tablets (30 mg) daily for 3 days, THEN 1 tablet (20 mg) daily for 3 days, THEN 0.5 tablets (10 mg) daily for 1 day.  Qty: 10 tablet, Refills: 0    Associated Diagnoses: Acute hypoxic respiratory failure (H); Acute bronchitis due to respiratory syncytial virus (RSV); Exacerbation of asthma, unspecified asthma severity, unspecified whether persistent           CONTINUE these medications which have NOT CHANGED    Details   acetaminophen (TYLENOL) 500 MG tablet TAKE 2 TABLETS (1G) BY MOUTH TWICE DAILY;AND MAY TAKE TWO TABLETS (1GM) ONCE DAILY AS NEEDED  Qty: 540 tablet, Refills: 3    Comments: Please authorize quantity 90 day supply with PRN refills for assisted living patient. Their cycle restarts 10/12/23. Thank you!  Associated Diagnoses: Primary osteoarthritis involving multiple joints      albuterol (PROAIR HFA/PROVENTIL HFA/VENTOLIN HFA) 108 (90 Base) MCG/ACT inhaler Inhale 2 puffs into the lungs every 4 hours as needed for shortness of breath / dyspnea or wheezing  Qty: 6.7 g, Refills: 3    Comments: Pharmacy may dispense brand covered by insurance (Proair, or proventil or ventolin or generic albuterol inhaler)  Associated Diagnoses: Asthma, unspecified asthma severity, unspecified whether complicated, unspecified whether persistent      CALMOSEPTINE 0.44-20.6 % OINT ointment APPLY TOPICALLY TO BUTTOCKS TWICE DAILY;&  "FOUR TIMES A DAY AS NEEDED  Qty: 113 g, Refills: PRN    Associated Diagnoses: Skin breakdown      camphor-menthol (ANTI-ITCH) 0.5-0.5 % external lotion APPLY A THIN LAYER TOPICALLY TO ITCHY AREAS (BACK, ARMS, NECK) THREE TIMES DAILY  Qty: 222 mL, Refills: 11    Associated Diagnoses: Itching      diclofenac (VOLTAREN) 1 % topical gel APPLY 2GM TOPICALLY TO RIGHT HIP TWICE DAILY  Qty: 100 g, Refills: 11    Associated Diagnoses: Primary osteoarthritis involving multiple joints; Hip pain, right; Impaired gait      fluticasone (FLONASE) 50 MCG/ACT nasal spray SPRAY 2 SPRAYS IN EACH NOSTRIL DAILY  Qty: 16 g, Refills: 11    Associated Diagnoses: Allergic rhinitis due to pollen, unspecified seasonality      hydrOXYzine (ATARAX) 25 MG tablet TAKE ONE-HALF TABLET (12.5MG) BY MOUTH TWICE DAILY  Qty: 28 tablet, Refills: 97    Comments: \"Cycle refill request. Cycle restarts (4/27/23).\"  Associated Diagnoses: Itching      ICY HOT MAX LIDOCAINE 4-1 % CREA APPLY 1GM TOPICALLY TO AFFECTED AREA(S) TWICE DAILY  Qty: 76.5 g, Refills: 97    Associated Diagnoses: Primary osteoarthritis involving multiple joints      loratadine (CLARITIN) 10 MG tablet TAKE 1 TABLET BY MOUTH ONCE DAILY  Qty: 90 tablet, Refills: 3    Comments: Please authorize quantity 90 day supply with PRN refills for assisted living patient. Their cycle restarts 10/12/23. Thank you!  Associated Diagnoses: Neurodermatitis      methylcellulose (CITRUCEL) powder Take 1 tablespoons every day prn loose stools    Associated Diagnoses: Loose stools      NYSTOP 952462 UNIT/GM powder APPLY TOPICALLY TO AFFECTED AREA(S) TWICE DAILY;& TWICE DAILY AS NEEDED  Qty: 60 g, Refills: 97    Associated Diagnoses: Yeast infection of the skin      oxyCODONE (ROXICODONE) 5 MG tablet Take 7.5 mg by mouth every morning And take an additional 0.5 tablets (2.5 mg) once daily as needed for pain.      SENNA-docusate sodium (SENNA S) 8.6-50 MG tablet Take 1 tablet by mouth daily as needed (if no BM " previous day)  Qty: 30 tablet, Refills: 1    Associated Diagnoses: Drug-induced constipation      SYMBICORT 160-4.5 MCG/ACT Inhaler INHALE 2 PUFFS INTO THE LUNGS TWICE DAILY  Qty: 10.2 g, Refills: 11    Associated Diagnoses: Mild intermittent asthma without complication      valsartan (DIOVAN) 40 MG tablet TAKE ONE AND ONE-HALF TABLETS (60MG) BY MOUTH ONCE DAILY  Qty: 135 tablet, Refills: 97    Comments: Please authorize quantity 90 day supply with PRN refills for assisted living patient. Their cycle restarts 1/4/24. Thank you!  Associated Diagnoses: Hypertension, unspecified type      VITAMIN D3 50 MCG (2000 UT) tablet TAKE 1 TABLET BY MOUTH ONCE DAILY  Qty: 90 tablet, Refills: 97    Comments: REFILL REQUEST FOR CYCLE THAT STARTS ON 12/7/23  Associated Diagnoses: Vitamin D deficiency           STOP taking these medications       albuterol (PROVENTIL) (2.5 MG/3ML) 0.083% neb solution Comments:   Reason for Stopping:         guaiFENesin (ROBITUSSIN) 20 mg/mL liquid Comments:   Reason for Stopping:             Allergies   Allergies   Allergen Reactions    Cats Other (See Comments)     Runny nose, watery eyes     Dogs Other (See Comments)     Runny nose and watery eyes     Mold Other (See Comments)     Runny nose, watery eyes     Pollen Extract Other (See Comments)     Runny nose and watery eyes

## 2024-01-29 NOTE — PLAN OF CARE
"INPATIENT NOTE: [id number]     PRIMARY PROBLEM: Asthma Exacerbation, SOB, RSV      BP (!) 147/93 (BP Location: Right arm)   Pulse 77   Temp 98.4  F (36.9  C) (Oral)   Resp 18   Ht 1.6 m (5' 3\")   SpO2 94%   BMI 26.57 kg/m        Orientation: Alert to self    Pain status:managed with schedule medication   Resp: on 3 L of o2 via NC   Cardiac: WDL   GI: Fecal Incontinence, underwear change for BM   : Urinary retention, bladder scans and documented,   Skin: redness, barrier cream applied.    LDA: No I V Acesss  Diet: Regular, needs assistance with meals   Activity: Ax2 with lift  Alarms/Safety: Bed alarm    Consults: PIERRE following for placement   Discharge Plan: Anticipating  TCU   Discharge Date: TBD     "

## 2024-01-29 NOTE — PROGRESS NOTES
"Assumed care from: 0700 to 1900    Blood pressure 138/87, pulse 98, temperature 98.2  F (36.8  C), temperature source Oral, resp. rate 18, height 1.6 m (5' 3\"), SpO2 93%.    Admit Date: 1/22/23  Admitting Diagnosis:Asthma Exacerbation/RSV  Neuro: Alert to self, , diagnosis of dementia  Activity: are 2 assist with Laura Steady  Telemetry Monitoring: No  Pain: denying pain.  GI:Bowels active x 4, small BM today  : Incontinent of bladder  Fluids: is Saline locked.  Diet: Regular  Discharge Disposition: Home tomorrow, (assisted Living)     "

## 2024-01-29 NOTE — PROGRESS NOTES
Care Management Discharge Note    Discharge Date: 01/29/2024       Discharge Disposition:  return to the Loc[facility]    Discharge Services:  new home O2    Discharge DME:  oxygen    Discharge Transportation: agency [employer] wheelchair between 3083-2707    Private pay costs discussed: transportation costs    Persons Notified of Discharge Plans: Assisted Living Facility RN, Director of Health Services  Patient/Family in Agreement with the Plan:  yes    Handoff Referral Completed: No    Additional Information:  CM following for discharge planning needs and return to Assisted Living Facility. Patient has orders to discharge back to the [facility] today. Call placed to the [facility] nurse line [phone] and updated them on patient return today and need for new home O2. She needs to speak to her director and will call CM back if they are able to accept. Orders for discharge were faxed to them at fax: [phone].     Call back received at 1214 from Hannah the [facility] Director of Health Services for update on patient discharge plan. Updated her that patient will be returning with need for new home O2. Updated her that we will arrange for O2 to be delivered at their facility before patient returns. Spoke to bedside RN who states oxygen should be delivered with in 2 hrs to the [facility], approximately 1415. Per MD, family is requesting a wheelchair transport for discharge. Call placed to [facility] transportation and wheelchair transport was arranged for  between [phone]. Call placed again to the [facility] nurse line and updated them on transport time and oxygen delivery. She verified that patient lives in room 101 for O2 delivery. Updated bedside RN on plan of care. Attempted to reach patient's daughter with no answer. Message left requesting call back for update on discharge plan.               Elin Reyes RN BSN CM  Inpatient Care Coordination  [facility]  [phone]

## 2024-01-29 NOTE — PLAN OF CARE
Pt confused.  Oriented to self.  Takes off TELE leads, gown, O2 saturation monitor frequently.  Reporting R knee pain.  Voltaren used. Ate 50% of breakfast.  Requested to sit in chair. Lift utilized. Incontinent of bladder. Changed and repositioned with 2 staff. Encouraged to increase fluid intake. Discharge anticipated this afternoon.

## 2024-01-29 NOTE — PLAN OF CARE
Per TELE, Pt with frequent PVCs.  Pulse in room on oximeter says 33 bpm, TELE says 94 bpm.  Per TELE, pulse Ox will not accurately measure pulse during PVCs.  TELE has been instructed to call RN when pulse is below 80 bpm.

## 2024-01-29 NOTE — PLAN OF CARE
Patient has been assessed for Home Oxygen needs. Oxygen readings:    *Pulse oximetry (SpO2) = 85% on room air at rest while awake.    *SpO2 improved to 90% on 2.5 liters/minute at rest.    *SpO2 = 84% on room air during activity/with exercise.    *SpO2 improved to 90% on 3liters/minute during activity/with exercise.

## 2024-02-01 NOTE — LETTER
"    2/1/2024        RE: Edmundo James  [address] [place] [address]        [facility] GERIATRICS    PRIMARY CARE PROVIDER AND CLINIC:  NADINE Quezada [facility], [address] [place] [address]  Chief Complaint   Patient presents with     Hospital F/U      [place] Medical Record Number:  [id number]  Place of Service where encounter took place[facility] [[id number]]    Edmundo James  is a 88 year old  (1935), admitted to the above facility from  [facility]. Hospital stay 1/22/24 through 1/29/24..   HPI:      Per [facility] chart review of hospital notes, discharge summary, imaging, labs, medications/medication changes:    Patient with PMH asthma, HTN, HLD, dementia and recent diagnosis of RSV hospitalized as above for increasing SOB. Diagnosed with Asthma exacerbation in setting of RSV. CXR negative for pneumonia. Initially required 10 L supplemental O2. Treated with 1 L IVF and duoneb. Continued on duonebs and prednisone and O2 tapered to 1- 2 LNC. Did also c/o right knee pain so Voltaren gel was added. Mild hyponatremia and leukopenia resolved prior to discharge. Patient continued to improve and was discharged back to A/L with supplemental O2.     VS, weights, NN reviewed in facility EMR.     Nursing reports no new concerns.     Patient found in A/ apartment. Alert, calm, NAD. Denies pain. Reports \"feel a lot better\". Denies noting SOB or wheezing. Denies fever or chills. Per patient and her  Kenny appetite is good/eating well. Denies chest pain, dizziness. Denies n/v or abdominal pain. States sleeping ok.       CODE STATUS/ADVANCE DIRECTIVES DISCUSSION:  Prior  DNR / DNI  ALLERGIES:   Allergies   Allergen Reactions     Cats Other (See Comments)     Runny nose, watery eyes      Dogs Other (See Comments)     Runny nose and watery eyes      Mold Other (See Comments)     Runny nose, watery eyes      Pollen Extract " Other (See Comments)     Runny nose and watery eyes      PAST MEDICAL HISTORY:   Past Medical History:   Diagnosis Date     Heart disease      HTN (hypertension)      Hyperlipidemia      OA (osteoarthritis) of knee      Uncomplicated asthma       PAST SURGICAL HISTORY:   has a past surgical history that includes Cholecystectomy; Lumpectomy breast; and tonsillectomy.  FAMILY HISTORY: family history includes Heart Disease in her father; Hypertension in her father and mother.  SOCIAL HISTORY:   reports that she has never smoked. She has never used smokeless tobacco. She reports current alcohol use. She reports that she does not use drugs.  Patient's living condition: lives in an assisted living facility    Post Discharge Medication Reconciliation Status:   MED REC REQUIRED  Post Medication Reconciliation Status: discharge medications reconciled and changed, per note/orders       Current Outpatient Medications   Medication Sig     acetaminophen (TYLENOL) 500 MG tablet TAKE 2 TABLETS (1G) BY MOUTH TWICE DAILY;AND MAY TAKE TWO TABLETS (1GM) ONCE DAILY AS NEEDED     albuterol (PROAIR HFA/PROVENTIL HFA/VENTOLIN HFA) 108 (90 Base) MCG/ACT inhaler Inhale 2 puffs into the lungs every 4 hours as needed for shortness of breath / dyspnea or wheezing     CALMOSEPTINE 0.44-20.6 % OINT ointment APPLY TOPICALLY TO BUTTOCKS TWICE DAILY;& FOUR TIMES A DAY AS NEEDED     camphor-menthol (ANTI-ITCH) 0.5-0.5 % external lotion APPLY A THIN LAYER TOPICALLY TO ITCHY AREAS (BACK, ARMS, NECK) THREE TIMES DAILY     diclofenac (VOLTAREN) 1 % topical gel APPLY 2GM TOPICALLY TO RIGHT HIP TWICE DAILY     fluticasone (FLONASE) 50 MCG/ACT nasal spray SPRAY 2 SPRAYS IN EACH NOSTRIL DAILY     hydrOXYzine (ATARAX) 25 MG tablet TAKE ONE-HALF TABLET (12.5MG) BY MOUTH TWICE DAILY     ICY HOT MAX LIDOCAINE 4-1 % CREA APPLY 1GM TOPICALLY TO AFFECTED AREA(S) TWICE DAILY     loratadine (CLARITIN) 10 MG tablet TAKE 1 TABLET BY MOUTH ONCE DAILY      "methylcellulose (CITRUCEL) powder Take 1 tablespoons every day prn loose stools     NYSTOP 655548 UNIT/GM powder APPLY TOPICALLY TO AFFECTED AREA(S) TWICE DAILY;& TWICE DAILY AS NEEDED     oxyCODONE (ROXICODONE) 5 MG tablet Take 7.5 mg by mouth every morning And take an additional 0.5 tablets (2.5 mg) once daily as needed for pain.     predniSONE (DELTASONE) 20 MG tablet Take 2 tablets (40 mg) by mouth daily for 1 day, THEN 1.5 tablets (30 mg) daily for 3 days, THEN 1 tablet (20 mg) daily for 3 days, THEN 0.5 tablets (10 mg) daily for 1 day.     SENNA-docusate sodium (SENNA S) 8.6-50 MG tablet Take 1 tablet by mouth daily as needed (if no BM previous day)     SYMBICORT 160-4.5 MCG/ACT Inhaler INHALE 2 PUFFS INTO THE LUNGS TWICE DAILY     valsartan (DIOVAN) 40 MG tablet TAKE ONE AND ONE-HALF TABLETS (60MG) BY MOUTH ONCE DAILY     VITAMIN D3 50 MCG (2000 UT) tablet TAKE 1 TABLET BY MOUTH ONCE DAILY     No current facility-administered medications for this visit.       ROS:  Limited secondary to cognitive impairment but today pt reports as above.  also provides some information    Vitals:  BP 98/68   Pulse 84   Temp 97.7  F (36.5  C)   Resp 18   Ht 1.6 m (5' 3\")   Wt 68 kg (150 lb)   SpO2 93%   BMI 26.57 kg/m    Exam:    GENERAL APPEARANCE: Alert, in no distress   ENT: Mouth and posterior oropharynx normal, moist mucous membranes   EYES: EOM, conjunctivae, lids, pupils and irises normal   NECK: No adenopathy,masses or thyromegaly   RESP: respiratory effort and palpation of chest normal, Lungs clear to auscultation, O2 2LNC  CV: VS as above, no edema noted  ABDOMEN: normal bowel sounds, soft, nontender, no hepatosplenomegaly or other masses   : palpation of bladder WNL   M/S: Gait and station normal   Digits and nails normal, seated in w/c- self propels  SKIN: Inspection of skin and subcutaneous tissue baseline, Palpation of skin and subcutaneous tissue baseline  NEURO: Cranial nerves 2-12 are grossly at " patient's baseline, Examination of sensation by touch normal   PSYCH: oriented X self and general situation, affect and mood normal         Lab/Diagnostic data:  Most Recent 3 CBC's:  Recent Labs   Lab Test 01/27/24  0641 01/25/24  0646 01/24/24  0457   WBC 9.0 8.7 9.2   HGB 13.6 13.8 13.5   MCV 85 86 85    302 317     Most Recent 3 BMP's:  Recent Labs   Lab Test 01/29/24  1028 01/28/24  0706 01/27/24  0641 01/26/24  0625 01/25/24  0646 01/24/24  0457   NA  --   --  133*  --  134* 134*   POTASSIUM 3.7 3.8 4.2   < > 4.0 3.5   CHLORIDE  --   --  98  --  98 100   CO2  --   --  25  --  28 24   BUN  --   --  24.0*  --  19.3 18.5   CR  --   --  0.56  --  0.58 0.56   ANIONGAP  --   --  10  --  8 10   RANDALL  --   --  9.0  --  9.3 9.4   GLC  --   --  86  --  84 82    < > = values in this interval not displayed.       ASSESSMENT/PLAN:       Acute hypoxemic respiratory failure (H)  RSV infection  Moderate persistent asthma with exacerbation  - improving. Dx with RSV initially 1/18. Has completed isolation. Resp status stable though continues to require supplemental O2  - continue prednisone taper - done 2/5  - continue Symbicort inhaler and prn albuterol inh  - continue flonase NS  - wean O2 as able keeping O2 sats > 90%  - monitor resp and VS    Primary hypertension  - chronic, running on lower side when last taken with SBP 98 with no associated s/s. BP at hospital discharge 146/80.   - will continue Valsartan at 40 mg daily for now  - monitor Bps and adjust pharmacotherapy prn.      Cognitive impairment  - chronic, memory impaired. Resides in A/L.  helps patient as well.  - continue supportive cares and services in A/L, anticipate needs    Hyponatremia  Leukopenia  - noted inpatient, resolved prior to discharge      Chronic pain of right knee  - chronic, no c/o knee pain today  - continue scheduled and prn Tylenol  - continue scheduled and prn oxycodone around cares/activity  - monitor effectiveness of pain  regimen      Plan of care discussed with patient/, nursing.    Electronically signed by:  NADINE Quezada CNP                    Sincerely,        NADINE Quezada CNP

## 2024-02-01 NOTE — PROGRESS NOTES
"[facility] GERIATRICS    PRIMARY CARE PROVIDER AND CLINIC:  NADINE Quezada CNP, [address] [address]  Chief Complaint   Patient presents with    Hospital F/U      [facility] Medical Record Number:  [id number]  Place of Service where encounter took place:  [facility] [[id number]]    Edmundo James  is a 88 year old  (1935), admitted to the above facility from  [facility]. Hospital stay 1/22/24 through 1/29/24..   HPI:      Per [facility] chart review of hospital notes, discharge summary, imaging, labs, medications/medication changes:    Patient with PMH asthma, HTN, HLD, dementia and recent diagnosis of RSV hospitalized as above for increasing SOB. Diagnosed with Asthma exacerbation in setting of RSV. CXR negative for pneumonia. Initially required 10 L supplemental O2. Treated with 1 L IVF and duoneb. Continued on duonebs and prednisone and O2 tapered to 1- 2 LNC. Did also c/o right knee pain so Voltaren gel was added. Mild hyponatremia and leukopenia resolved prior to discharge. Patient continued to improve and was discharged back to A/L with supplemental O2.     VS, weights, NN reviewed in facility EMR.     Nursing reports no new concerns.     Patient found in A/ apartment. Alert, calm, NAD. Denies pain. Reports \"feel a lot better\". Denies noting SOB or wheezing. Denies fever or chills. Per patient and her  Kenny appetite is good/eating well. Denies chest pain, dizziness. Denies n/v or abdominal pain. States sleeping ok.       CODE STATUS/ADVANCE DIRECTIVES DISCUSSION:  Prior  DNR / DNI  ALLERGIES:   Allergies   Allergen Reactions    Cats Other (See Comments)     Runny nose, watery eyes     Dogs Other (See Comments)     Runny nose and watery eyes     Mold Other (See Comments)     Runny nose, watery eyes     Pollen Extract Other (See Comments)     Runny nose and watery eyes      PAST MEDICAL HISTORY:   Past Medical " History:   Diagnosis Date    Heart disease     HTN (hypertension)     Hyperlipidemia     OA (osteoarthritis) of knee     Uncomplicated asthma       PAST SURGICAL HISTORY:   has a past surgical history that includes Cholecystectomy; Lumpectomy breast; and tonsillectomy.  FAMILY HISTORY: family history includes Heart Disease in her father; Hypertension in her father and mother.  SOCIAL HISTORY:   reports that she has never smoked. She has never used smokeless tobacco. She reports current alcohol use. She reports that she does not use drugs.  Patient's living condition: lives in an assisted living facility    Post Discharge Medication Reconciliation Status:   MED REC REQUIRED  Post Medication Reconciliation Status: discharge medications reconciled and changed, per note/orders       Current Outpatient Medications   Medication Sig    acetaminophen (TYLENOL) 500 MG tablet TAKE 2 TABLETS (1G) BY MOUTH TWICE DAILY;AND MAY TAKE TWO TABLETS (1GM) ONCE DAILY AS NEEDED    albuterol (PROAIR HFA/PROVENTIL HFA/VENTOLIN HFA) 108 (90 Base) MCG/ACT inhaler Inhale 2 puffs into the lungs every 4 hours as needed for shortness of breath / dyspnea or wheezing    CALMOSEPTINE 0.44-20.6 % OINT ointment APPLY TOPICALLY TO BUTTOCKS TWICE DAILY;& FOUR TIMES A DAY AS NEEDED    camphor-menthol (ANTI-ITCH) 0.5-0.5 % external lotion APPLY A THIN LAYER TOPICALLY TO ITCHY AREAS (BACK, ARMS, NECK) THREE TIMES DAILY    diclofenac (VOLTAREN) 1 % topical gel APPLY 2GM TOPICALLY TO RIGHT HIP TWICE DAILY    fluticasone (FLONASE) 50 MCG/ACT nasal spray SPRAY 2 SPRAYS IN EACH NOSTRIL DAILY    hydrOXYzine (ATARAX) 25 MG tablet TAKE ONE-HALF TABLET (12.5MG) BY MOUTH TWICE DAILY    ICY HOT MAX LIDOCAINE 4-1 % CREA APPLY 1GM TOPICALLY TO AFFECTED AREA(S) TWICE DAILY    loratadine (CLARITIN) 10 MG tablet TAKE 1 TABLET BY MOUTH ONCE DAILY    methylcellulose (CITRUCEL) powder Take 1 tablespoons every day prn loose stools    NYSTOP 500940 UNIT/GM powder APPLY  "TOPICALLY TO AFFECTED AREA(S) TWICE DAILY;& TWICE DAILY AS NEEDED    oxyCODONE (ROXICODONE) 5 MG tablet Take 7.5 mg by mouth every morning And take an additional 0.5 tablets (2.5 mg) once daily as needed for pain.    predniSONE (DELTASONE) 20 MG tablet Take 2 tablets (40 mg) by mouth daily for 1 day, THEN 1.5 tablets (30 mg) daily for 3 days, THEN 1 tablet (20 mg) daily for 3 days, THEN 0.5 tablets (10 mg) daily for 1 day.    SENNA-docusate sodium (SENNA S) 8.6-50 MG tablet Take 1 tablet by mouth daily as needed (if no BM previous day)    SYMBICORT 160-4.5 MCG/ACT Inhaler INHALE 2 PUFFS INTO THE LUNGS TWICE DAILY    valsartan (DIOVAN) 40 MG tablet TAKE ONE AND ONE-HALF TABLETS (60MG) BY MOUTH ONCE DAILY    VITAMIN D3 50 MCG (2000 UT) tablet TAKE 1 TABLET BY MOUTH ONCE DAILY     No current facility-administered medications for this visit.       ROS:  Limited secondary to cognitive impairment but today pt reports as above.  also provides some information    Vitals:  BP 98/68   Pulse 84   Temp 97.7  F (36.5  C)   Resp 18   Ht 1.6 m (5' 3\")   Wt 68 kg (150 lb)   SpO2 93%   BMI 26.57 kg/m    Exam:    GENERAL APPEARANCE: Alert, in no distress   ENT: Mouth and posterior oropharynx normal, moist mucous membranes   EYES: EOM, conjunctivae, lids, pupils and irises normal   NECK: No adenopathy,masses or thyromegaly   RESP: respiratory effort and palpation of chest normal, Lungs clear to auscultation, O2 2LNC  CV: VS as above, no edema noted  ABDOMEN: normal bowel sounds, soft, nontender, no hepatosplenomegaly or other masses   : palpation of bladder WNL   M/S: Gait and station normal   Digits and nails normal, seated in w/c- self propels  SKIN: Inspection of skin and subcutaneous tissue baseline, Palpation of skin and subcutaneous tissue baseline  NEURO: Cranial nerves 2-12 are grossly at patient's baseline, Examination of sensation by touch normal   PSYCH: oriented X self and general situation, affect and mood " normal         Lab/Diagnostic data:  Most Recent 3 CBC's:  Recent Labs   Lab Test 01/27/24  0641 01/25/24  0646 01/24/24  0457   WBC 9.0 8.7 9.2   HGB 13.6 13.8 13.5   MCV 85 86 85    302 317     Most Recent 3 BMP's:  Recent Labs   Lab Test 01/29/24  1028 01/28/24  0706 01/27/24  0641 01/26/24  0625 01/25/24  0646 01/24/24  0457   NA  --   --  133*  --  134* 134*   POTASSIUM 3.7 3.8 4.2   < > 4.0 3.5   CHLORIDE  --   --  98  --  98 100   CO2  --   --  25  --  28 24   BUN  --   --  24.0*  --  19.3 18.5   CR  --   --  0.56  --  0.58 0.56   ANIONGAP  --   --  10  --  8 10   RANDALL  --   --  9.0  --  9.3 9.4   GLC  --   --  86  --  84 82    < > = values in this interval not displayed.       ASSESSMENT/PLAN:       Acute hypoxemic respiratory failure (H)  RSV infection  Moderate persistent asthma with exacerbation  - improving. Dx with RSV initially 1/18. Has completed isolation. Resp status stable though continues to require supplemental O2  - continue prednisone taper - done 2/5  - continue Symbicort inhaler and prn albuterol inh  - continue flonase NS  - wean O2 as able keeping O2 sats > 90%  - monitor resp and VS    Primary hypertension  - chronic, running on lower side when last taken with SBP 98 with no associated s/s. BP at hospital discharge 146/80.   - will continue Valsartan at 40 mg daily for now  - monitor Bps and adjust pharmacotherapy prn.      Cognitive impairment  - chronic, memory impaired. Resides in A/L.  helps patient as well.  - continue supportive cares and services in A/L, anticipate needs    Hyponatremia  Leukopenia  - noted inpatient, resolved prior to discharge      Chronic pain of right knee  - chronic, no c/o knee pain today  - continue scheduled and prn Tylenol  - continue scheduled and prn oxycodone around cares/activity  - monitor effectiveness of pain regimen      Plan of care discussed with patient/, nursing.    Electronically signed by:  Daria Jefferson APRN CNP

## 2024-02-05 PROBLEM — D72.819 LEUKOPENIA: Status: ACTIVE | Noted: 2024-01-01

## 2024-02-09 NOTE — TELEPHONE ENCOUNTER
[facility] Geriatrics Triage Nurse Telephone Encounter    Provider: NADINE Quezada CNP   Facility: [facility]  Facility Type:  AL    Caller: Taj  Call Back Number: [phone]    Allergies:    Allergies   Allergen Reactions    Cats Other (See Comments)     Runny nose, watery eyes     Dogs Other (See Comments)     Runny nose and watery eyes     Mold Other (See Comments)     Runny nose, watery eyes     Pollen Extract Other (See Comments)     Runny nose and watery eyes        Reason for call: Nurse is calling to report that patient is very lethargic and sleeping at meals.  She returned from the hospital on 1/29/24 from having RSV and acute respiratory failure.  VS:  T=97.4, P=84 and irregular, R=18, BP=98/60, O2=96% 2.5L/min.  Writer instructed the nurse to have the patient be evaluated in the ER due to hypotension, arrhythmia, lethargy, and deconditioning.  EMS came and their VS were:  132/80, R=70, O2=95% 2.5L/min.  Patient was refusing to go to the ER, so EMS did not take her.      Verbal Order/Direction given by Provider: Daily nurse visit for assessment and VS.  PT/OT eval and treat.  Utilize PRN inhaler.  Encourage staff to take patient to meals and to encourage her to eat and drink.      Provider giving Order:  NADINE Kirk CNP    Verbal Order given to: Taj Kruger RN

## 2024-02-12 NOTE — PROGRESS NOTES
[facility] GERIATRICS    Chief Complaint   Patient presents with    RSV    Asthma     HPI:  Edmundo James is a 88 year old  (1935), who is being seen today for an episodic care visit at: [facility] ([facility] [[id number]].     Patient seen today in [facility] apartment for check of the following medical issues:    1. Acute hypoxemic respiratory failure (H)    2. RSV infection    3. Moderate persistent asthma with exacerbation    4. Confusion    5. Cognitive impairment    6. Insomnia, unspecified type    7. Chronic pain of right knee    8. Chronic bilateral low back pain without sciatica    9. Generalized muscle weakness      Per nursing patient noted to have more confusion, anxiety, insomnia and weakness over the w/e. ED offered but patient declined. VSS, afebrile.     VS, weights, NN reviewed in facility EMR today.    Patient found in apartment today sitting up in w/c. Alert, calm, NAD.  Kenny is also present. He notes he has noted ongoing weakness and confusion. Reports patient now requires Jayy for transfers and prior to hospitalization was assist of 1. Also notes patient has been c/o of more back and right hip and knee pain. Wondering about ? Increase in patient's oxycodone. Discussed r/b of increase dosing of opioids including worsening confusion/delirium and sedation. Discussed confusion, insomnia likely associated with combination of prednisone taper, acute illness and care transitions. Patient remains on supplemental O2, also new since hospitalization. Patient and  report have noted no wheezing and reduction in cough. Patient denies SOB currently. Does report some low back pain.  Kenny wondering if this is r/t w/c position since back is being pulled further back with O2 on it. Discussed weaning O2 and both are in agreement with this plan. Discussed referral to PT/OT and again both are in agreement with this plan. Kenny requested I contact their daughter to further discuss  "possible changes to pain regimen.     Allergies, and PMH/PSH reviewed in [facility] today.  REVIEW OF SYSTEMS:  Limited secondary to cognitive impairment but today pt reports as above    Objective:   BP 98/68   Pulse 84   Temp 97.7  F (36.5  C)   Resp 18   Ht 1.6 m (5' 3\")   Wt 68 kg (150 lb)   SpO2 93%   BMI 26.57 kg/m      Resp: Effort WNL, LSCTA, O2 2.5 LNC, writer decreased this to 2 LNC  CV: VS as above,   Abd- soft, nontender, BS +  Musc- ANDREA- in w/c- station WNL  Psych- alert, calm, pleasant      Recent labs in [facility] reviewed by me today.     Assessment/Plan:     Acute hypoxemic respiratory failure (H)  RSV infection- dx 1/18  Moderate persistent asthma with exacerbation  - hospitalized for this. Treated with duonebs and steroids- transitioned to oral prednisone taper which was completed 2/5. Improving respiratory status. Remains on supplemental O2- new since hospitalization  - continue Symbicort and prn albuterol nebs, nasal fluiticasone  - order to wean O2 to keep sats > 90%, wean by 1 L daily with BID spot O2 sat checks.   - monitor resp status, VS    Confusion  - worsening acute following hospitalization and since on steroids. Calm currently  Cognitive impairment  - ongoing- resides in [facility] apartment with  Kenny and supportive cares and services  Insomnia, unspecified type  - over w/e- melatonin added and slept good last two days  --------------------------  - order for OT to work on MRADLs and neurocog  - ongoing supportive cares/services in [facility]  - monitor mood and behaviors    Chronic pain of right knee  Chronic bilateral low back pain without sciatica  Weakness  - worsening reports of pain since hospitalization, weaker than baseline as well. Requiring Jayy for transfers - used to be assist.   - Order for PT to work on strengthening, transfers- optimize function and lessen pain  - continues on scheduled Tylenol and on oxycodone 7.5 mg a.m with cares and 2.5 mg po prn for breakthrough pain. " Discussed concern regarding increased confusion/delirium with increasing oxycodone dose.   - consider increasing acetaminophen and perhaps adding low dose gabapentin    Call placed to daughter Kandice Willis to discuss pain regimen further. Reached voicemail so message left with call back contact information.        Orders:  Written on unit and discussed with nursing, patient, family    Electronically signed by: NADINE Quezada CNP     Documentation of Face to Face and Certification for Home Health Services    I certify that patient: Edmundo James is under my care and that I, or a nurse practitioner or physician's assistant working with me, had a face-to-face encounter that meets the physician face-to-face encounter requirements with this patient on: 2/12/2024.    This encounter with the patient was in whole, or in part, for the following medical condition, which is the primary reason for home health care: as above.    I certify that, based on my findings, the following services are medically necessary home health services: Occupational Therapy and Physical Therapy.    My clinical findings support the need for the above services because: Occupational Therapy Services are needed to assess and treat cognitive ability and address ADL safety due to impairment in weakness,, confusion - independence with MRADLs and neurocog. and Physical Therapy Services are needed to assess and treat the following functional impairments: weakness, work on transfers.    Further, I certify that my clinical findings support that this patient is homebound (i.e. absences from home require considerable and taxing effort and are for medical reasons or [employer]ist services or infrequently or of short duration when for other reasons) because: Requires assistance of another person or specialized equipment to access medical services because patient: Requires supervision of another for safe transfer...    Based on the above findings.  I certify that this patient is confined to the home and needs intermittent skilled nursing care, physical therapy and/or speech therapy.  The patient is under my care, and I have initiated the establishment of the plan of care.  This patient will be followed by a physician who will periodically review the plan of care.  Physician/Provider to provide follow up care: Daria Jefferson    Attending hospital physician (the Medicare certified [facility] provider): NADINE Quezada CNP  Physician Signature: See electronic signature associated with these discharge orders.  Date: 2/12/2024

## 2024-02-12 NOTE — LETTER
2/12/2024        RE: Edmundo James  [address] [place] [address]        [facility] GERIATRICS    Chief Complaint   Patient presents with     RSV     Asthma     HPI:  Edmundo James is a 88 year old  (1935), who is being seen today for an episodic care visit at: [facility] ([facility]) [[id number]].     Patient seen today in [facility] apartment for check of the following medical issues:    1. Acute hypoxemic respiratory failure (H)    2. RSV infection    3. Moderate persistent asthma with exacerbation    4. Confusion    5. Cognitive impairment    6. Insomnia, unspecified type    7. Chronic pain of right knee    8. Chronic bilateral low back pain without sciatica    9. Generalized muscle weakness      Per nursing patient noted to have more confusion, anxiety, insomnia and weakness over the w/e. ED offered but patient declined. VSS, afebrile.     VS, weights, NN reviewed in facility EMR today.    Patient found in apartment today sitting up in w/c. Alert, calm, NAD.  Kenny is also present. He notes he has noted ongoing weakness and confusion. Reports patient now requires Jayy for transfers and prior to hospitalization was assist of 1. Also notes patient has been c/o of more back and right hip and knee pain. Wondering about ? Increase in patient's oxycodone. Discussed r/b of increase dosing of opioids including worsening confusion/delirium and sedation. Discussed confusion, insomnia likely associated with combination of prednisone taper, acute illness and care transitions. Patient remains on supplemental O2, also new since hospitalization. Patient and  report have noted no wheezing and reduction in cough. Patient denies SOB currently. Does report some low back pain.  Kenny wondering if this is r/t w/c position since back is being pulled further back with O2 on it. Discussed weaning O2 and both are in agreement with this plan. Discussed referral to PT/OT and again  "both are in agreement with this plan. Kenny requested I contact their daughter to further discuss possible changes to pain regimen.     Allergies, and PMH/PSH reviewed in [facility] today.  REVIEW OF SYSTEMS:  Limited secondary to cognitive impairment but today pt reports as above    Objective:   BP 98/68   Pulse 84   Temp 97.7  F (36.5  C)   Resp 18   Ht 1.6 m (5' 3\")   Wt 68 kg (150 lb)   SpO2 93%   BMI 26.57 kg/m      Resp: Effort WNL, LSCTA, O2 2.5 LNC, writer decreased this to 2 LNC  CV: VS as above,   Abd- soft, nontender, BS +  Musc- ANDREA- in w/c- station WNL  Psych- alert, calm, pleasant      Recent labs in [facility] reviewed by me today.     Assessment/Plan:     Acute hypoxemic respiratory failure (H)  RSV infection- dx 1/18  Moderate persistent asthma with exacerbation  - hospitalized for this. Treated with duonebs and steroids- transitioned to oral prednisone taper which was completed 2/5. Improving respiratory status. Remains on supplemental O2- new since hospitalization  - continue Symbicort and prn albuterol nebs, nasal fluiticasone  - order to wean O2 to keep sats > 90%, wean by 1 L daily with BID spot O2 sat checks.   - monitor resp status, VS    Confusion  - worsening acute following hospitalization and since on steroids. Calm currently  Cognitive impairment  - ongoing- resides in [facility] apartment with  Kenny and supportive cares and services  Insomnia, unspecified type  - over w/e- melatonin added and slept good last two days  --------------------------  - order for OT to work on MRADLs and neurocog  - ongoing supportive cares/services in [facility]  - monitor mood and behaviors    Chronic pain of right knee  Chronic bilateral low back pain without sciatica  Weakness  - worsening reports of pain since hospitalization, weaker than baseline as well. Requiring Jayy for transfers - used to be assist.   - Order for PT to work on strengthening, transfers- optimize function and lessen pain  - continues on scheduled " Tylenol and on oxycodone 7.5 mg a.m with cares and 2.5 mg po prn for breakthrough pain. Discussed concern regarding increased confusion/delirium with increasing oxycodone dose.   - consider increasing acetaminophen and perhaps adding low dose gabapentin    Call placed to daughter Kandice Willis to discuss pain regimen further. Reached voicemail so message left with call back contact information.        Orders:  Written on unit and discussed with nursing, patient, family    Electronically signed by: NADINE Quezada CNP     Documentation of Face to Face and Certification for Home Health Services    I certify that patient: Edmundo James is under my care and that I, or a nurse practitioner or physician's assistant working with me, had a face-to-face encounter that meets the physician face-to-face encounter requirements with this patient on: 2/12/2024.    This encounter with the patient was in whole, or in part, for the following medical condition, which is the primary reason for home health care: as above.    I certify that, based on my findings, the following services are medically necessary home health services: Occupational Therapy and Physical Therapy.    My clinical findings support the need for the above services because: Occupational Therapy Services are needed to assess and treat cognitive ability and address ADL safety due to impairment in weakness,, confusion - independence with MRADLs and neurocog. and Physical Therapy Services are needed to assess and treat the following functional impairments: weakness, work on transfers.    Further, I certify that my clinical findings support that this patient is homebound (i.e. absences from home require considerable and taxing effort and are for medical reasons or [employer] services or infrequently or of short duration when for other reasons) because: Requires assistance of another person or specialized equipment to access medical services because  patient: Requires supervision of another for safe transfer...    Based on the above findings. I certify that this patient is confined to the home and needs intermittent skilled nursing care, physical therapy and/or speech therapy.  The patient is under my care, and I have initiated the establishment of the plan of care.  This patient will be followed by a physician who will periodically review the plan of care.  Physician/Provider to provide follow up care: Daria Jefferson    Attending hospital physician (the Medicare certified [facility] provider): NADINE Quezada CNP  Physician Signature: See electronic signature associated with these discharge orders.  Date: 2/12/2024           Sincerely,        NADINE Quezada CNP

## 2024-02-15 NOTE — PROGRESS NOTES
[facility] GERIATRICS    Chief Complaint   Patient presents with    Nursing Home Acute     HPI:  Edmundo James is a 88 year old  (1935), who is being seen today for an episodic care visit at: [facility] ([facility]) [[id number]].     [facility] check of the following medical issues:    1. RSV infection    2. Moderate persistent asthma with exacerbation    3. Cognitive impairment    4. Confusion    5. Moderate episode of recurrent major depressive disorder (H)    6. Anxiety    7. Insomnia, unspecified type    8. Chronic bilateral low back pain without sciatica    9. Chronic pain of right knee      Patient recently hospitalized for RSV and asthma exacerbation. Required steroids and supplemental O2. Was discharged back to A/L with prednisone taper and supplemental O2 2.5 LNC. Prednisone taper was completed 2/5 and patient was weaned off of supplemental O2 last week. Has had some recent noted increased confusion, anxiety and exacerbation of chronic right leg and back pain. Writer discussed patient with her daughter Kandice Willis recently who relayed that she felt the overarching issue with her mother is anxiety and states she has noted when anxiety takes hold it is slow to lessen and l/t pain, insomnia and stress for both Elvira and her  Kenny who lives in [facility] apartment with his wife.     Nursing reports noting small erythematic areas to back of patient's thighs r/t pressure from w/c. As a result patient is being moved to chair from w/c periodically during the day and areas are being monitored.     VS, weights and NN reviewed in facility EMR today.    Patient found in apartment seated in a recliner. Alert, calm, NAD.  Kenny is also present. Patient denies concerns currently. Kenny reports is happy patient was able to wean from O2. Patient denies noting SOB, cough, wheezing and Kenny agrees. Patient denies pain currently. Discussed patient mood/anxiety and both state  "this is present at times and could be part of reason has some trouble with insomnia and increased pain at times. Discussed increasing scheduled acetaminophen and adding antidepressant, Celexa. Writer did relay that had also spoken to their daughter Kandice recently. Both are in agreement with this plan and with continue home rehab.     Allergies, and PMH/PSH reviewed in EPIC today.  REVIEW OF SYSTEMS:  Limited secondary to cognitive impairment but today pt reports as above    Objective:   /70   Pulse 87   Temp 98.1  F (36.7  C)   Resp 18   Ht 1.6 m (5' 3\")   Wt 67.6 kg (149 lb)   SpO2 94%   BMI 26.39 kg/m      Resp: Effort WNL, LSCTA, RA  CV: VS as above, no edema noted  Abd- soft, nontender, BS +  Musc- ANDREA- seated in chair, station WNL  Psych- alert, calm, pleasant      Most Recent 3 CBC's:  Recent Labs   Lab Test 01/27/24  0641 01/25/24  0646 01/24/24  0457   WBC 9.0 8.7 9.2   HGB 13.6 13.8 13.5   MCV 85 86 85    302 317     Most Recent 3 BMP's:  Recent Labs   Lab Test 02/06/24  1145 01/29/24  1028 01/28/24  0706 01/27/24  0641 01/26/24  0625 01/25/24  0646     --   --  133*  --  134*   POTASSIUM 3.8 3.7 3.8 4.2   < > 4.0   CHLORIDE 101  --   --  98  --  98   CO2 30*  --   --  25  --  28   BUN 23.5*  --   --  24.0*  --  19.3   CR 0.58  --   --  0.56  --  0.58   ANIONGAP 6*  --   --  10  --  8   RANDALL 9.0  --   --  9.0  --  9.3   GLC 88  --   --  86  --  84    < > = values in this interval not displayed.       Assessment/Plan:     RSV infection - dx 1/18  Moderate persistent asthma with exacerbation  - hospitalized 2/2 RSV/asthma exacerbation. Completed prednsone taper 2/5. Off supplemental O2 now. Resp status stable.   - continue on Symbicort and prn albuterol inh  - continue flonase NS  - monitor resp status, VS    Cognitive impairment  Confusion  Moderate episode of recurrent major depressive disorder (H)  Anxiety  Insomnia, unspecified type  - increased confusion/anxiety noted by family " since back from hospital. Writer explained acute illness, care transitions and steroids likely etiology for this. Patient is working with OT/PT. Discussed adding antidepressant and patient is open to trying this. Of note patient does have h/o mild hyponatremia. Last Na WNL. Does seem to be sleeping better of late per  Kenny.  - order to add Citalopram 10 mg po daily. Monitor tolerance and for GI side effects- did discuss this possibility with patient/.   - continue on melatonin  - continue home rehab  - monitor mood and behaviors  - will recheck BMP in a couple of weeks to ensure lytes/Na level stable after initiating selective serotonin reuptake inhibitor    Chronic bilateral low back pain without sciatica  Chronic pain of right knee  - chronic, some noted exacerbation though appears and states is comfortable currently  - order to increase scheduled acetaminophen from BID to TID.  - continue home rehab  - monitor      Orders:  Written in facility and discussed with patient,  Kenny, daughter Kandice and nursing    Electronically signed by: NADINE Quezada CNP

## 2024-02-15 NOTE — LETTER
2/15/2024        RE: Edmundo James  [address] [place] [address]        [facility] GERIATRICS    Chief Complaint   Patient presents with     Nursing Home Acute     HPI:  Edmundo James is a 88 year old  (1935), who is being seen today for an episodic care visit at: [facility] ([facility]) [[id number]].     Patient seen in [facility] for check of the following medical issues:    1. RSV infection    2. Moderate persistent asthma with exacerbation    3. Cognitive impairment    4. Confusion    5. Moderate episode of recurrent major depressive disorder (H)    6. Anxiety    7. Insomnia, unspecified type    8. Chronic bilateral low back pain without sciatica    9. Chronic pain of right knee      Patient recently hospitalized for RSV and asthma exacerbation. Required steroids and supplemental O2. Was discharged back to A/L with prednisone taper and supplemental O2 2.5 LNC. Prednisone taper was completed 2/5 and patient was weaned off of supplemental O2 last week. Has had some recent noted increased confusion, anxiety and exacerbation of chronic right leg and back pain. Writer discussed patient with her daughter Kandice Willis recently who relayed that she felt the overarching issue with her mother is anxiety and states she has noted when anxiety takes hold it is slow to lessen and l/t pain, insomnia and stress for both Elvira and her  Kenny who lives in [facility] apartment with his wife.     Nursing reports noting small erythematic areas to back of patient's thighs r/t pressure from w/c. As a result patient is being moved to chair from w/c periodically during the day and areas are being monitored.     VS, weights and NN reviewed in facility EMR today.    Patient found in apartment seated in a recliner. Alert, calm, NAD.  Kenny is also present. Patient denies concerns currently. Kenny reports is happy patient was able to wean from O2. Patient denies noting SOB, cough, wheezing  "and Kenny agrees. Patient denies pain currently. Discussed patient mood/anxiety and both state this is present at times and could be part of reason has some trouble with insomnia and increased pain at times. Discussed increasing scheduled acetaminophen and adding antidepressant, Celexa. Writer did relay that had also spoken to their daughter Kandice recently. Both are in agreement with this plan and with continue home rehab.     Allergies, and PMH/PSH reviewed in EPIC today.  REVIEW OF SYSTEMS:  Limited secondary to cognitive impairment but today pt reports as above    Objective:   /70   Pulse 87   Temp 98.1  F (36.7  C)   Resp 18   Ht 1.6 m (5' 3\")   Wt 67.6 kg (149 lb)   SpO2 94%   BMI 26.39 kg/m      Resp: Effort WNL, LSCTA, RA  CV: VS as above, no edema noted  Abd- soft, nontender, BS +  Musc- ANDREA- seated in chair, station WNL  Psych- alert, calm, pleasant      Most Recent 3 CBC's:  Recent Labs   Lab Test 01/27/24  0641 01/25/24  0646 01/24/24  0457   WBC 9.0 8.7 9.2   HGB 13.6 13.8 13.5   MCV 85 86 85    302 317     Most Recent 3 BMP's:  Recent Labs   Lab Test 02/06/24  1145 01/29/24  1028 01/28/24  0706 01/27/24  0641 01/26/24  0625 01/25/24  0646     --   --  133*  --  134*   POTASSIUM 3.8 3.7 3.8 4.2   < > 4.0   CHLORIDE 101  --   --  98  --  98   CO2 30*  --   --  25  --  28   BUN 23.5*  --   --  24.0*  --  19.3   CR 0.58  --   --  0.56  --  0.58   ANIONGAP 6*  --   --  10  --  8   RANDALL 9.0  --   --  9.0  --  9.3   GLC 88  --   --  86  --  84    < > = values in this interval not displayed.       Assessment/Plan:     RSV infection - dx 1/18  Moderate persistent asthma with exacerbation  - hospitalized 2/2 RSV/asthma exacerbation. Completed prednsone taper 2/5. Off supplemental O2 now. Resp status stable.   - continue on Symbicort and prn albuterol inh  - continue flonase NS  - monitor resp status, VS    Cognitive impairment  Confusion  Moderate episode of recurrent major depressive " disorder (H)  Anxiety  Insomnia, unspecified type  - increased confusion/anxiety noted by family since back from hospital. Writer explained acute illness, care transitions and steroids likely etiology for this. Patient is working with OT/PT. Discussed adding antidepressant and patient is open to trying this. Of note patient does have h/o mild hyponatremia. Last Na WNL. Does seem to be sleeping better of late per  Kenny.  - order to add Citalopram 10 mg po daily. Monitor tolerance and for GI side effects- did discuss this possibility with patient/.   - continue on melatonin  - continue home rehab  - monitor mood and behaviors  - will recheck BMP in a couple of weeks to ensure lytes/Na level stable after initiating selective serotonin reuptake inhibitor    Chronic bilateral low back pain without sciatica  Chronic pain of right knee  - chronic, some noted exacerbation though appears and states is comfortable currently  - order to increase scheduled acetaminophen from BID to TID.  - continue home rehab  - monitor      Orders:  Written in facility and discussed with patient,  Kenny, daughter Kandice and nursing    Electronically signed by: NADINE Quezada CNP       Sincerely,        NADINE Quezada CNP

## 2024-02-22 NOTE — LETTER
"    2/22/2024        RE: Edmundo James  [address] [place] [address]        [facility] GERIATRICS    Chief Complaint   Patient presents with     Nursing Home Acute     HPI:  Edmundo James is a 88 year old  (1935), who is being seen today for an episodic care visit at: [facility] [[id number]].     Patient seen today in [facility] apartment 2/2 nursing reports of \"dry heaving\" all day 2/20. Patient is new on Celexa as of last week for anxiety/depression and has h/o hyponatremia.    VS, weights, NN reviewed today in facility EMR.    Patient found in apartment sitting up in w/c. Alert, calm, NAD.  Kenny is also present. Kenny reports patient does occasionally have episode of \"retching\" but states usually short lived and passes quickly and is isolated. Kenny reports on 2/20 the dry heaves lasted all day. Both deny associated \"choking\" or abdominal pain or new diarrhea. Both report no further episodes since 2/20. Patient reports \"feel fine\". Reports did eat breakfast. Denies further stomach upset, nausea or decreased appetite.     Allergies, and PMH/PSH reviewed in [facility] today.  REVIEW OF SYSTEMS:  Limited secondary to cognitive impairment but today pt reports as above    Objective:   /70   Pulse 87   Temp 98.1  F (36.7  C)   Resp 18   Ht 1.6 m (5' 3\")   Wt 67.6 kg (149 lb)   SpO2 94%   BMI 26.39 kg/m      Resp: Effort WNL, LSCTA, RA  CV: VS as above  Abd- soft, nontender, BS +  Musc- ANDREA- seated in w/c, station WNL  Psych- alert, calm, pleasant      Recent labs in [facility] reviewed by me today.     Assessment/Plan:     Dry heaves  Moderate episode of recurrent major depressive disorder (H)  Anxiety  Hyponatremia  - new on Celexa as of last week. Mood/behaviors stable. One day of \"dry heaves\" 2/20- no further episodes since. Appetite baseline.   - order for BMP check next week to ensure stability following n/v and new on selective serotonin reuptake inhibitor.  - " monitor for any further GI s/s and report to provider if noted          Orders:  Written on unit and discussed with patient,  Kenny and nursing      Electronically signed by: NADINE Quezada CNP          Sincerely,        NADINE Quezada CNP

## 2024-02-22 NOTE — PROGRESS NOTES
"[facility] GERIATRICS    Chief Complaint   Patient presents with    Nursing Home Acute     HPI:  Edmundo James is a 88 year old  (1935), who is being seen today for an episodic care visit at: [facility] ([facility]) [[id number]].     Patient seen today in [facility] apartment 2/2 nursing reports of \"dry heaving\" all day 2/20. Patient is new on Celexa as of last week for anxiety/depression and has h/o hyponatremia.    VS, weights, NN reviewed today in facility EMR.    Patient found in apartment sitting up in w/c. Alert, calm, NAD.  Kenny is also present. Kenny reports patient does occasionally have episode of \"retching\" but states usually short lived and passes quickly and is isolated. Kenny reports on 2/20 the dry heaves lasted all day. Both deny associated \"choking\" or abdominal pain or new diarrhea. Both report no further episodes since 2/20. Patient reports \"feel fine\". Reports did eat breakfast. Denies further stomach upset, nausea or decreased appetite.     Allergies, and PMH/PSH reviewed in [facility] today.  REVIEW OF SYSTEMS:  Limited secondary to cognitive impairment but today pt reports as above    Objective:   /70   Pulse 87   Temp 98.1  F (36.7  C)   Resp 18   Ht 1.6 m (5' 3\")   Wt 67.6 kg (149 lb)   SpO2 94%   BMI 26.39 kg/m      Resp: Effort WNL, LSCTA, RA  CV: VS as above  Abd- soft, nontender, BS +  Musc- ANDREA- seated in w/c, station WNL  Psych- alert, calm, pleasant      Recent labs in [facility] reviewed by me today.     Assessment/Plan:     Dry heaves  Moderate episode of recurrent major depressive disorder (H)  Anxiety  Hyponatremia  - new on Celexa as of last week. Mood/behaviors stable. One day of \"dry heaves\" 2/20- no further episodes since. Appetite baseline.   - order for BMP check next week to ensure stability following n/v and new on selective serotonin reuptake inhibitor.  - monitor for any further GI s/s and report to provider if noted          Orders:  Written on unit " and discussed with patient,  Kenny and nursing      Electronically signed by: NADINE Quezada CNP

## 2024-03-18 NOTE — LETTER
3/18/2024        RE: Edmundo James  [address] [place] [address]        [facility] GERIATRICS    Chief Complaint   Patient presents with     Nursing Home Acute     HPI:  Edmundo James is a 88 year old  (1935), who is being seen today for an episodic care visit at: [facility] ([facility]) [[id number]].     Patient seen today in [facility] apartment 2/2 nursing reports of noted increased pain with cares and transfers. Are now using Jayy lift for transfers. Patient with increased pain and weakness since hospitalization early this year for acute respiratory failure 2/2 RSV and asthma exacerbation. PMH notable for Alzheimer's dementia. Resides in [facility] apartment with  Kenny. Noted increased depression/anxiety s/s also since hospitalization. Is new on Lexapro x approximately one month.     1. Primary osteoarthritis involving multiple joints    2. Other chronic pain    3. Generalized muscle weakness    4. Depression with anxiety    5. Late onset Alzheimer's disease without behavioral disturbance (H)      Nursing concerns as above. Oxycodone increased by writer last week 2/2 pain concerns.    VS, weights, NN reviewed in facility EMR.     Patient found in apartment seated in w/c. Alert, calm, NAD. Denies pain currently.  Kenny also there. Reports noting same as nursing above, increased pain especially with a.m and p.m cares and with transfers. Also notes staff come in around 6 a.m to give a.m oxycodone but then also check and change incontinent product at this time which causes patient a great deal of pain and anxiety. He reports then staff come in again about 0730 to get her up. Discussed perhaps combining cares and transfer at 0730 and simply administering the oxycodone at 0600. Patient and Kenny in agreement with this plan. Patient reports pain mainly to right hip and leg, sometimes the other hip also bothers but mainly on right (which is known and chronic, but worsening).  "Both are uncertain if increase in oxycodone dose in morning has been helpful in treating pain associated with cares. Discussed will allow a bit more time to monitor this. Patient and  state mood is baseline, intermittent situational anxiety- and uncertain if note an improvement since starting on Lexapro. Per Kenny \"maybe a little\".     Allergies, and PMH/PSH reviewed in EPIC today.  REVIEW OF SYSTEMS:  Limited secondary to cognitive impairment but today pt reports as above    Objective:   /84   Pulse 97   Temp 97.4  F (36.3  C)   Resp 18   Ht 1.6 m (5' 3\")   Wt 66.3 kg (146 lb 3.2 oz)   SpO2 90%   BMI 25.90 kg/m      Resp: Effort WNL, LSCTA, RA  CV: VS as above- trace bilateral LE edema  Abd- soft, nontender, BS +  Musc- ANDREA- knee joints baseline, w/c dependent- station WNL  Psych- alert, calm, pleasant      Most Recent 3 CBC's:  Recent Labs   Lab Test 01/27/24  0641 01/25/24  0646 01/24/24  0457   WBC 9.0 8.7 9.2   HGB 13.6 13.8 13.5   MCV 85 86 85    302 317     Most Recent 3 BMP's:  Recent Labs   Lab Test 02/27/24  1113 02/06/24  1145 01/29/24  1028 01/28/24  0706 01/27/24  0641    137  --   --  133*   POTASSIUM 4.1 3.8 3.7   < > 4.2   CHLORIDE 99 101  --   --  98   CO2 28 30*  --   --  25   BUN 12.7 23.5*  --   --  24.0*   CR 0.56 0.58  --   --  0.56   ANIONGAP 8 6*  --   --  10   RANDALL 8.8 9.0  --   --  9.0   * 88  --   --  86    < > = values in this interval not displayed.       Assessment/Plan:     Primary osteoarthritis involving multiple joints  Other chronic pain  Generalized muscle weakness  - chronic, worsening pain and weakness since January 24 hospitalization. W/c dependent.   Now requiring Jayy transfers. Did participate in round of rehab with PT/OT but functional gains were limited.   - continue on increased oxycodone - now with larger dose in a.m - 7.5 mg and 5 mg HS and increased daily prn for breakthrough pain from 2.5 mg to 5 mg - monitor effectiveness, also " cluster all a.m cares at 0730, administer oxycodone a.m dose only at 06oo- writer discussed with patient/ and nursing today  - continue on scheduled acetaminophen and on voltaren cream  - monitor pain/effectiveness of regimen change    Depression with anxiety  - chronic, ? Less anxiety or improved mood since new on Lexapro since approximately a month ago. F/up BMP WNL.  - continue on Lexapro  - monitor mood/behaviors  Late onset Alzheimer's disease without behavioral disturbance (H)  - chronic, ongoing. Memory impaired. W/c dependent. Tolerating regular diet and weights stable.    Kenny does help with caregiving- keeps an eye on her. Resides in [facility] with supportive cares and services.   Progressive disease, continued global decline is anticipated.  POLST reviewed today - DNR/DNI with selective restorative cares.  -  continue supportive cares and services in [place], anticipate needs, cue for safety            Orders:  Written on unit, discussed with patient,  Kenny and nursing    Electronically signed by: NADINE Quezada CNP          Sincerely,        NADINE Quezada CNP

## 2024-03-18 NOTE — PROGRESS NOTES
[facility] GERIATRICS    Chief Complaint   Patient presents with    Nursing Home Acute     HPI:  Edmundo James is a 88 year old  (1935), who is being seen today for an episodic care visit at: [facility][note 3[facility] ([facility]) [[id number]].     Patient seen today in [facility] apartment 2/2 nursing reports of noted increased pain with cares and transfers. Are now using Jayy lift for transfers. Patient with increased pain and weakness since hospitalization early this year for acute respiratory failure 2/2 RSV and asthma exacerbation. PMH notable for Alzheimer's dementia. Resides in [facility] apartment with  Kenny. Noted increased depression/anxiety s/s also since hospitalization. Is new on Lexapro x approximately one month.     1. Primary osteoarthritis involving multiple joints    2. Other chronic pain    3. Generalized muscle weakness    4. Depression with anxiety    5. Late onset Alzheimer's disease without behavioral disturbance (H)      Nursing concerns as above. Oxycodone increased by writer last week 2/2 pain concerns.    VS, weights, NN reviewed in facility EMR.     Patient found in apartment seated in w/c. Alert, calm, NAD. Denies pain currently.  Kenny also there. Reports noting same as nursing above, increased pain especially with a.m and p.m cares and with transfers. Also notes staff come in around 6 a.m to give a.m oxycodone but then also check and change incontinent product at this time which causes patient a great deal of pain and anxiety. He reports then staff come in again about 0730 to get her up. Discussed perhaps combining cares and transfer at 0730 and simply administering the oxycodone at 0600. Patient and Kenny in agreement with this plan. Patient reports pain mainly to right hip and leg, sometimes the other hip also bothers but mainly on right (which is known and chronic, but worsening). Both are uncertain if increase in oxycodone dose in morning has been helpful in treating pain  "associated with cares. Discussed will allow a bit more time to monitor this. Patient and  state mood is baseline, intermittent situational anxiety- and uncertain if note an improvement since starting on Lexapro. Per Kenny \"maybe a little\".     Allergies, and PMH/PSH reviewed in [facility] today.  REVIEW OF SYSTEMS:  Limited secondary to cognitive impairment but today pt reports as above    Objective:   /84   Pulse 97   Temp 97.4  F (36.3  C)   Resp 18   Ht 1.6 m (5' 3\")   Wt 66.3 kg (146 lb 3.2 oz)   SpO2 90%   BMI 25.90 kg/m      Resp: Effort WNL, LSCTA, RA  CV: VS as above- trace bilateral LE edema  Abd- soft, nontender, BS +  Musc- ANDREA- knee joints baseline, w/c dependent- station WNL  Psych- alert, calm, pleasant      Most Recent 3 CBC's:  Recent Labs   Lab Test 01/27/24  0641 01/25/24  0646 01/24/24  0457   WBC 9.0 8.7 9.2   HGB 13.6 13.8 13.5   MCV 85 86 85    302 317     Most Recent 3 BMP's:  Recent Labs   Lab Test 02/27/24  1113 02/06/24  1145 01/29/24  1028 01/28/24  0706 01/27/24  0641    137  --   --  133*   POTASSIUM 4.1 3.8 3.7   < > 4.2   CHLORIDE 99 101  --   --  98   CO2 28 30*  --   --  25   BUN 12.7 23.5*  --   --  24.0*   CR 0.56 0.58  --   --  0.56   ANIONGAP 8 6*  --   --  10   RANDALL 8.8 9.0  --   --  9.0   * 88  --   --  86    < > = values in this interval not displayed.       Assessment/Plan:     Primary osteoarthritis involving multiple joints  Other chronic pain  Generalized muscle weakness  - chronic, worsening pain and weakness since January 24 hospitalization. W/c dependent.   Now requiring Jayy transfers. Did participate in round of rehab with PT/OT but functional gains were limited.   - continue on increased oxycodone - now with larger dose in a.m - 7.5 mg and 5 mg HS and increased daily prn for breakthrough pain from 2.5 mg to 5 mg - monitor effectiveness, also cluster all a.m cares at 0730, administer oxycodone a.m dose only at 06oo- writer discussed " with patient/ and nursing today  - continue on scheduled acetaminophen and on voltaren cream  - monitor pain/effectiveness of regimen change    Depression with anxiety  - chronic, ? Less anxiety or improved mood since new on Lexapro since approximately a month ago. F/up BMP WNL.  - continue on Lexapro  - monitor mood/behaviors  Late onset Alzheimer's disease without behavioral disturbance (H)  - chronic, ongoing. Memory impaired. W/c dependent. Tolerating regular diet and weights stable.    Kenny does help with caregiving- keeps an eye on her. Resides in senior living with supportive cares and services.   Progressive disease, continued global decline is anticipated.  POLST reviewed today - DNR/DNI with selective restorative cares.  -  continue supportive cares and services in [place], anticipate needs, cue for safety            Orders:  Written on unit, discussed with patient,  Kenny and nursing    Electronically signed by: NADINE Quezada CNP

## 2024-03-24 PROBLEM — F41.8 DEPRESSION WITH ANXIETY: Status: ACTIVE | Noted: 2024-01-01

## 2024-03-24 PROBLEM — G89.29 OTHER CHRONIC PAIN: Status: ACTIVE | Noted: 2024-01-01

## 2024-03-24 PROBLEM — M15.0 PRIMARY OSTEOARTHRITIS INVOLVING MULTIPLE JOINTS: Status: ACTIVE | Noted: 2024-01-01

## 2024-04-15 NOTE — PROGRESS NOTES
"[facility] GERIATRICS    Chief Complaint   Patient presents with    Nursing Home Acute     HPI:  Edmundo James is a 88 year old  (1935), who is being seen today for an episodic care visit at: [facility] ([facility]) [[id number]].     Patient seen today in [facility] apartment 2/2 nursing reports of persistent severe chronic right hip and leg pain despite recent changes to pain regimen/timing. Note pain rated 10/10 with yelling and anxiety with a.m cares and transfers per KHADAR lift.     VS, weights, NN reviewed in facility EMR today    Patient found in [facility] apartment seated in w/c.  Kenny is also present. Both confirm the above nursing reports of persistent severe right hip and leg pain particularly of concern during a.m cares and transfers. Have not noted that increasing doses of oxycodone or timing of medication have made a difference for patient in terms of pain management. Patient reports some discomfort currently to right hip. Both note recent increase in patient anxiety and  Kenny reports noting progressively worse confusion. Patient denies noting fever or new urinary s/s. Denies new SOB, cough, congestion.   Discuss hospice, criteria, philosophy and routines. Both patient and her  are open to considering this and in agreement with informational referral to learn more.    Allergies, and PMH/PSH reviewed in EPIC today.  REVIEW OF SYSTEMS:  Limited secondary to cognitive impairment but today pt reports as above    Objective:   /74   Pulse 80   Temp 98.1  F (36.7  C)   Resp 15   Ht 1.6 m (5' 3\")   Wt 84.3 kg (185 lb 12.8 oz)   SpO2 (!) 84%   BMI 32.91 kg/m      Resp: Effort WNL, LSCTA, RA  CV: RRR, no edema noted  Abd- soft, nontender, BS +  Musc- ANDREA- seated in w/c, station WNL  Psych- alert, confused intermittently,       Most Recent 3 CBC's:  Recent Labs   Lab Test 01/27/24  0641 01/25/24  0646 01/24/24  0457   WBC 9.0 8.7 9.2   HGB 13.6 13.8 13.5   MCV 85 86 85 "    302 317     Most Recent 3 BMP's:  Recent Labs   Lab Test 02/27/24  1113 02/06/24  1145 01/29/24  1028 01/28/24  0706 01/27/24  0641    137  --   --  133*   POTASSIUM 4.1 3.8 3.7   < > 4.2   CHLORIDE 99 101  --   --  98   CO2 28 30*  --   --  25   BUN 12.7 23.5*  --   --  24.0*   CR 0.56 0.58  --   --  0.56   ANIONGAP 8 6*  --   --  10   RANDALL 8.8 9.0  --   --  9.0   * 88  --   --  86    < > = values in this interval not displayed.       Assessment/Plan:     Primary osteoarthritis involving multiple joints  Other chronic pain  Generalized muscle weakness  Late onset Alzheimer's disease without behavioral disturbance (H)  Depression with anxiety  - chronic, pain not well controlled with increasing doses of oxycodone nor in change to timing around cares. Concern for increasing doses further given minimal benefit and concerning SE profile. Progressive confusion and debility. Jayy lift now. Discussed hospice care, referral as above and both are open to referral to learn more.   - order for hospice information referral  -  continue supportive cares and services in nursing home, anticipate needs, cue for safety  - continue current pain regimen without change for now - management per hospice if does enroll and if not will [place] back - consider adding low dose ativan prior to cares and does sound like much of the discomfort may be r/t anticipatory pain and anxiety.        Orders:  Written on unit and discussed with patient, patient  and nursing    Electronically signed by: NADINE Quezada CNP

## 2024-04-15 NOTE — LETTER
"    4/15/2024        RE: Edmundo James  [address] [place] [address]        [facility] GERIATRICS    Chief Complaint   Patient presents with     Nursing Home Acute     HPI:  Edmundo James is a 88 year old  (1935), who is being seen today for an episodic care visit at: [facility] ([facility]) [[id number]].     Patient seen today in [facility] apartment 2/2 nursing reports of persistent severe chronic right hip and leg pain despite recent changes to pain regimen/timing. Note pain rated 10/10 with yelling and anxiety with a.m cares and transfers per KHADAR lift.     VS, weights, NN reviewed in facility EMR today    Patient found in [facility] apartment seated in w/c.  Kenny is also present. Both confirm the above nursing reports of persistent severe right hip and leg pain particularly of concern during a.m cares and transfers. Have not noted that increasing doses of oxycodone or timing of medication have made a difference for patient in terms of pain management. Patient reports some discomfort currently to right hip. Both note recent increase in patient anxiety and  Kenny reports noting progressively worse confusion. Patient denies noting fever or new urinary s/s. Denies new SOB, cough, congestion.   Discuss hospice, criteria, philosophy and routines. Both patient and her  are open to considering this and in agreement with informational referral to learn more.    Allergies, and PMH/PSH reviewed in EPIC today.  REVIEW OF SYSTEMS:  Limited secondary to cognitive impairment but today pt reports as above    Objective:   /74   Pulse 80   Temp 98.1  F (36.7  C)   Resp 15   Ht 1.6 m (5' 3\")   Wt 84.3 kg (185 lb 12.8 oz)   SpO2 (!) 84%   BMI 32.91 kg/m      Resp: Effort WNL, LSCTA, RA  CV: RRR, no edema noted  Abd- soft, nontender, BS +  Musc- ANDREA- seated in w/c, station WNL  Psych- alert, confused intermittently,       Most Recent 3 CBC's:  Recent Labs   Lab Test " 01/27/24  0641 01/25/24  0646 01/24/24  0457   WBC 9.0 8.7 9.2   HGB 13.6 13.8 13.5   MCV 85 86 85    302 317     Most Recent 3 BMP's:  Recent Labs   Lab Test 02/27/24  1113 02/06/24  1145 01/29/24  1028 01/28/24  0706 01/27/24  0641    137  --   --  133*   POTASSIUM 4.1 3.8 3.7   < > 4.2   CHLORIDE 99 101  --   --  98   CO2 28 30*  --   --  25   BUN 12.7 23.5*  --   --  24.0*   CR 0.56 0.58  --   --  0.56   ANIONGAP 8 6*  --   --  10   RANDALL 8.8 9.0  --   --  9.0   * 88  --   --  86    < > = values in this interval not displayed.       Assessment/Plan:     Primary osteoarthritis involving multiple joints  Other chronic pain  Generalized muscle weakness  Late onset Alzheimer's disease without behavioral disturbance (H)  Depression with anxiety  - chronic, pain not well controlled with increasing doses of oxycodone nor in change to timing around cares. Concern for increasing doses further given minimal benefit and concerning SE profile. Progressive confusion and debility. Jayy lift now. Discussed hospice care, referral as above and both are open to referral to learn more.   - order for hospice information referral  -  [facility] supportive [facility] and services in [facility], anticipate needs, cue for safety  - continue current pain regimen without change for now - management per hospice if does enroll and if not will [place] back - consider adding low dose ativan prior to cares and does sound like much of the discomfort may be r/t anticipatory pain and anxiety.        Orders:  Written on unit and discussed with patient, patient  and nursing    Electronically signed by: NADINE Quezada CNP          Sincerely,        NADINE Quezada CNP

## 2024-05-06 NOTE — LETTER
5/6/2024        RE: Edmundo James  [address] [place] [address]        No notes on file      Sincerely,        Lary Sue MD

## 2024-05-25 NOTE — PROGRESS NOTES
Edmundo James is a 88 year old female seen May 6, 2024 at [facility] where she has resided for 4 and a half years (admit 9/2019) seen to follow up Alzheimer's dementia and OA with progressive debility.   Pt is seen in her apartment that she shares with her , and he is present to help with history.   Up to Broda chair, does not respond to questions.   Appears comfortable now, but Kenny reports that when she is moved for transfers or cares she has pain.   He had called Hospice a couple of times over this past week, noting she was working to get her breath.   Medications adjusted and that is better today.       By chart review, pt has had a progressive cognitive and physical decline over the past several years.   She is no longer ambulatory and language is impaired as well.  Pt had not been seen by a medical provider for several years, until August 2021. Family has reported cognitive impairment that preceded the move to [place], she was on donepezil in [place], but no formal workup.   Donepezil discontinued secondary to side effects.   Pt had a symptomatic COVID 19 infection in early September 2021, then later that month she had an overnight stay at [facility] after she accidentally took her 's medications.  She was unresponsive with BP 52/27 initially.   She improved with IVF and was able to return to AL.     In October 2020 she could stand-pivot transfer but otherwise WC bound.   She was seen at [facility] in November and THIAGO planned, delayed by pandemic.  In the interim pt lost the ability to stand and requires mechanical lift for transfers, so not a candidate for the THIAGO.    Seen by Orthoped[facility], noted knees are bone on bone and likely will not benefit from further cortisone injections.        Pt had a January 2024 [facility] hospitalization for RSV with asthma exacerbation and respiratory failure   Infection resolved and she stabilized, returned to AL with [facility]    However she had significant  "decline after that with severe right hip pain, depression and anxiety.   Medications added and adjusted, and then with ongoing decline she was enrolled in [place] [facility] in April 2024       Past Medical History:   Diagnosis Date    Heart disease     HTN (hypertension)     Hyperlipidemia     OA (osteoarthritis) of knee     Uncomplicated asthma    Late onset Alzheimer's dementia  Depression /anxiety       Past Surgical History:   Procedure Laterality Date    CHOLECYSTECTOMY      LUMPECTOMY BREAST      TONSILLECTOMY       SH: Lives with her  [facility] apartment. They moved to [place] from [place], [place] in 2019   Daughter Kandice lives locally and helps with cares.   Non smoker    ROS:    Weight in 2021 was 129 lbs     Wt Readings from Last 5 Encounters:   05/06/24 84.3 kg (185 lb 12.8 oz)   04/15/24 84.3 kg (185 lb 12.8 oz)   03/18/24 66.3 kg (146 lb 3.2 oz)   02/22/24 67.6 kg (149 lb)   02/15/24 67.6 kg (149 lb)      EXAM: NAD, up to Broda  /74   Pulse 80   Temp 98.1  F (36.7  C)   Resp 15   Ht 1.6 m (5' 3\")   Wt 84.3 kg (185 lb 12.8 oz)   SpO2 (!) 84%   BMI 32.91 kg/m     Neck supple without adenopathy  Lungs clear bilaterally with fair air movement   Heart RRR s1s2  Abd soft, NT, no distention or guarding,+BS  Ext without edema, knee flexion contractures bilaterally    Neuro: WC bound, not verbal     Lab Results   Component Value Date     02/27/2024    POTASSIUM 4.1 02/27/2024    CHLORIDE 99 02/27/2024    CO2 28 02/27/2024    ANIONGAP 8 02/27/2024     (H) 02/27/2024    BUN 12.7 02/27/2024    CR 0.56 02/27/2024    GFRESTIMATED 88 02/27/2024    RANDALL 8.8 02/27/2024     Lab Results   Component Value Date    WBC 9.0 01/27/2024      HGB 13.6 01/27/2024      MCV 85 01/27/2024       01/27/2024          IMP/PLAN:   (F41.8) Depression with anxiety  Comment: accompanying cognitive and physical decline     Plan: citalopram 10 mg/day, scheduled lorazepam 0.5 mg qid (added this past weekend)    Melatonin3 " mg/ HS for sleep    (J45.40) Moderate persistent asthma, unspecified whether complicated  Comment: recent exacerbation secondary to RSV infection     Plan: Symbicort inhaler bid, prn albuterol  MS for air hunger         (M89.49) Primary osteoarthritis involving multiple joints    (M25.551) Hip pain, right  Comment: now a Jayy lift bed to Broda   Contractures both knees     Plan:  Scheduled acetaminophen 1000 mg bid and PRN, diclofenac gel locally for pain management     With worsened pain, Hospice increased MS to 5 mg q hours and PRN     (I10) Essential hypertension  Comment: normal renal function   BP Readings from Last 3 Encounters:   05/06/24 112/74   04/15/24 112/74   03/18/24 128/84      Plan: valsartan 60 mg/day    (G30.1,  F02.80) Late onset Alzheimer's disease without behavioral disturbance (H)  Comment: loss of mobility and language, very low functional status   Plan: AL and family support for med admin, meals, activity     (Z51.5) Hospice care patient  Comment: PRNs available   Plan: treat to comfort      Lary Sue MD

## 2024-11-11 NOTE — PROGRESS NOTES
[facility] GERIATRIC SERVICES  PRIMARY CARE PROVIDER AND CLINIC:  Dolores Babb, NADINE CNP, [address] [facility] [address] / [place] [address]  Chief Complaint   Patient presents with     [facility] Medical Record Number:  [id number]  Place of Service where encounter took place:  [facility] ([facility] [[id number]]    Edmundo James  is a 85 year old  (1935), living in above facility since March 2019 and now choosing to change PCPs to [facility].    HPI:      Patient is an 85 year old female that moved from [place] with her  in 2019.  She has remained in independent living since her move up to [place] and has not established with another healthcare provider or been seen medically.  She has had increased difficulty with ambulation and dressing and family wants her to establish with onsite care.  PHI significant for HTN, HLD, OA, asthma, and dementia without official work-up.  Very poor PMH is available and most given by her daughter, Kandice.    Patient is seen in her apartment today with her .  She reports doing just fine other then she is feeling weaker and having a harder time ambulating.  She defers most questions asked to her to her , saying she doesn't know.  Initially denies pain but then reports her knees and right hip bother her a lot, especially with ambulation.  She has fallen, none this month.      Per her , she has not been consistently taking her medications, although she feels that she has.  Mood appears down and depressed,  and daughter report this has been ongoing since her move to [place].  Patient agrees with this, but does not want to try any medications at this time    She denies shortness of breath, CP, dizziness, constipation, diarrhea, n/v, GERD, insomnia.  She does admit to being a little more forgetful then before, family has noticed a fairly significant decline cognitively since move to [place].  She was started on Aricept back in [place], but per daughter, no  formal work-up of her cognitive decline was done then.  We discussed ACP today, she and family in agreement DNR/DNI, ok for hospitalization for restorative cares.      CODE STATUS/ADVANCE DIRECTIVES DISCUSSION:  No CPR- Do NOT Intubate  DNR / DNI  ALLERGIES:   Allergies   Allergen Reactions     Cats Other (See Comments)     Runny nose, watery eyes      Dogs Other (See Comments)     Runny nose and watery eyes      Mold Other (See Comments)     Runny nose, watery eyes       PAST MEDICAL HISTORY:   Past Medical History:   Diagnosis Date     Heart disease      HTN (hypertension)      Hyperlipidemia      OA (osteoarthritis) of knee      Uncomplicated asthma       PAST SURGICAL HISTORY:   has a past surgical history that includes Cholecystectomy; Lumpectomy breast; and tonsillectomy.  FAMILY HISTORY: family history includes Heart Disease in her father; Hypertension in her father and mother.  SOCIAL HISTORY:   reports that she has never smoked. She has never used smokeless tobacco. She reports current alcohol use. She reports that she does not use drugs.  Patient's living condition: lives in an assisted living facility    Post Discharge Medication Reconciliation Status: patient was not discharged from an inpatient facility  Current Outpatient Medications   Medication Sig     albuterol (PROAIR HFA, PROVENTIL HFA, VENTOLIN HFA) 108 (90 BASE) MCG/ACT inhaler Inhale 2 puffs into the lungs every 6 hours     Atorvastatin Calcium (LIPITOR PO) Take 20 mg by mouth     BABY ASPIRIN PO      metoprolol (LOPRESSOR) 25 MG tablet Take 25 mg by mouth 2 times daily     triamcinolone (KENALOG) 0.1 % cream Apply sparingly to affected area three times daily as needed.     Valsartan (DIOVAN PO) Take 320 mg by mouth     No current facility-administered medications for this visit.       ROS:  10 point ROS of systems including Constitutional, Eyes, Respiratory, Cardiovascular, Gastroenterology, Genitourinary, Integumentary, Musculoskeletal,  "Psychiatric were all negative except for pertinent positives noted in my HPI.    Vitals:  BP (!) 155/106   Pulse 82   Temp (!) 96.6  F (35.9  C)   Resp 20   Ht 1.607 m (5' 3.25\")   Wt 58.5 kg (129 lb)   BMI 22.67 kg/m    Exam:  GENERAL APPEARANCE:  Alert, in no distress, cooperative  EYES:  EOM, conjunctivae, lids, pupils and irises normal, PERRL  NECK:  No adenopathy,masses or thyromegaly  RESP:  respiratory effort and palpation of chest normal, lungs clear to auscultation , no respiratory distress  CV:  Palpation and auscultation of heart done , regular rate and rhythm, no murmur, rub, or gallop, no edema  ABDOMEN:  no guarding or rebound, bowel sounds normal, no organomegaly  M/S:   Gait and station abnormal ambulates with 4ww, drags her left heel on ground, unsteady, difficulty going from sitting to standing position, gait very slow, no edema/erythema joints  SKIN:  no obvious rashes or wounds  NEURO:   speech clear, no tremor, normal strenght and tone, PERRL, poor recall of events   PSYCH:  oriented X 3, insight and judgement impaired, memory impaired , depressed     Lab/Diagnostic data:  none available    ASSESSMENT/PLAN:  (I10) Essential hypertension  (primary encounter diagnosis)  Comment: review of recent /95, 149/70, 151/96, 139/87, 132/81, 140/70  -per family, patient has not been consistently taking her medications.  She is supposed to be on Losartan/Hctz combo pill as well as metoprolol.  Staff will take over meds now.  Will need to assess BP with meds being taken and re-evaluate.  Discussed with family and staff today  Plan: BMP and CBC next week  -staff administer BP meds  -BP daily X 2 weeks, staff to update me   -losartan/hctz, metoprolol hold if HR <60    (M89.49) Primary osteoarthritis involving multiple joints  (R26.2) Difficulty walking  Impaired Gait  Right Hip Pain  (M62.81) Generalized muscle weakness  Comment: severe to multiple joints, affecting ability to walk and dress self, " pain to right hip.  No recent fall in past month.  No history of x-ray to right hip per family  -has been using prn Ibuprofen for pain, discussed dangers of this med in older adults and new pain management plan  Plan: AL staff assisting patient with cares  -home care for therapy strengthening, evaluation of potential needed DME  -x-ray right hip  -APAP 1,000mg po BID and QDprn  -Voltaren gel BID and BID prn     (J45.909) Asthma, unspecified asthma severity, unspecified whether complicated, unspecified whether persistent  Comment: per history, per family was severe at the time, but patient has not had any issues for several years.  Not currently on meds and has no rescue inhaler  Plan: albuterol rescue inhaler    (E78.5) Hyperlipidemia, unspecified hyperlipidemia type  Comment: per history  -lipid prescribed but per family, patient was not taking consistently  Plan: lipid panel next week   -on statin      (G30.1,  F02.80) Late onset Alzheimer's disease without behavioral disturbance (H)  Comment: per family report  -no formal cognitive testing available, but obvious dementia, poor historian, unable to recall events  -Aricept was prescribed while in [place], patient has not been taking consistently per family  Plan: staff assist with meds and cares   -aricept  -home care for cognitive testing   -TSH next week     ACP  Comment: discussed today with patient and family  DNR/DNI, ok for restorative cares    Depression  Comment: obvious during encounter, family reports has been ongoing issue since move to [place] from [place]  -discussed with patient today and she does not wish to start meds  Plan: AL staff will now be managing meds and assisting with cares, pain management as above.  Will re-evaluate in next few months and if no improvement, consider starting SSRI    Total time spent with patient visit at the skilled nursing facility was 80 minutes including patient visit, review of past records, phone call to patient contact and  discussion on ACP. Greater than 50% of total time spent with counseling and coordinating care due to discussion on management of hip pain and impaired gait, management and monitoring of depression and HTN, discussion of ACP.     Electronically signed by:  NADINE Rogers CNP     Documentation of Face to Face and Certification for Home Health Services    I certify that services are/were furnished while this patient was under the care of a physician and that a physician or an allowed non-physician practitioner (NPP), had a face-to-face encounter that meets the physician face-to-face encounter requirements. The encounter was in whole, or in part, related to the primary reason for home health. The patient is confined to his/her home and needs intermittent skilled nursing, physical therapy, speech-language pathology, or the continued need for occupational therapy. A plan of care has been established by a physician and is periodically reviewed by a physician.  Date of Face-to-Face Encounter: 8/24/2021.    I certify that, based on my findings, the following services are medically necessary home health services: Occupational Therapy and Physical Therapy.    My clinical findings support the need for the above skilled services because: Requires assistance of another person or specialized equipment to access medical services because patient: Has prohibitive pain during ambulation. and Requires supervision of another for safe transfer...    Patient to re-establish plan of care with their PCP within 7-10 days after leaving the facility to reestablish care.  Medicare certified PEC[facility] provider: NADINE Rogers CNP  Date: August 25, 2021                 Not applicable